# Patient Record
Sex: FEMALE | Race: WHITE | NOT HISPANIC OR LATINO | Employment: FULL TIME | URBAN - METROPOLITAN AREA
[De-identification: names, ages, dates, MRNs, and addresses within clinical notes are randomized per-mention and may not be internally consistent; named-entity substitution may affect disease eponyms.]

---

## 2016-06-15 LAB
CREAT SERPL-MCNC: 0.77 MG/DL (ref 0.57–1.11)
GLUCOSE BLD-MCNC: 108 MG/DL (ref 65–100)

## 2017-01-11 ENCOUNTER — MYC REFILL (OUTPATIENT)
Dept: FAMILY MEDICINE | Facility: CLINIC | Age: 52
End: 2017-01-11

## 2017-01-11 DIAGNOSIS — M25.562 ACUTE PAIN OF LEFT KNEE: ICD-10-CM

## 2017-01-11 DIAGNOSIS — F41.1 GENERALIZED ANXIETY DISORDER: ICD-10-CM

## 2017-01-11 RX ORDER — ESCITALOPRAM OXALATE 20 MG/1
20 TABLET ORAL DAILY
Qty: 90 TABLET | Refills: 0 | Status: CANCELLED | OUTPATIENT
Start: 2017-01-11

## 2017-01-11 RX ORDER — HYDROCODONE BITARTRATE AND ACETAMINOPHEN 5; 325 MG/1; MG/1
TABLET ORAL
Qty: 40 TABLET | Refills: 0 | Status: CANCELLED | OUTPATIENT
Start: 2017-01-11

## 2017-01-11 NOTE — TELEPHONE ENCOUNTER
Patient due for OV and possible further evaluation for Norco request.  Last given in October for knee pain.  My chart message sent to patient to schedule appointment.  Val Bertrand RN  Triage Flex Workforce          Controlled Substance Refill Request for [place] 5/325  Problem List Complete:  No     PROVIDER TO CONSIDER COMPLETION OF PROBLEM LIST AND OVERVIEW/CONTROLLED SUBSTANCE AGREEMENT    Last Written Prescription Date:  10/26/2016  Last Fill Quantity: 40,   # refills: 0    Last Office Visit with [facility] primary care provider: 10/28/2015    Future Office visit:     Controlled substance agreement on file: No.     Processing:  Mail to [facility] pharmacy   checked in past 6 months?  Yes 1/11/2017   RX [place] reviewed:  reviewed- no concerns - placed on providers desk    [place]:  [email or web address]    Lexapro addressed in refill request from pharmacy.

## 2017-01-11 NOTE — TELEPHONE ENCOUNTER
Message from Kirstent:  Original authorizing provider: NURA Hoover CNP would like a refill of the following medications:  escitalopram (LEXAPRO) 20 MG tablet [NURA Hoover CNP]  HYDROcodone-acetaminophen (NORCO) 5-325 MG per tablet [NURA Hoover CNP]    Preferred pharmacy: [facility] [address] AT [facility] [address]    Comment:

## 2017-01-13 NOTE — TELEPHONE ENCOUNTER
Please see My Chart Message.  Sent My Chart reply to patient informing her that her refill request for Lexapro will be forwarded to Viji Gilliland.  Separate lexapro refill encounter routed to Viji Gilliland for approval.  Susanne Skelton RN

## 2017-01-13 NOTE — TELEPHONE ENCOUNTER
Left another message to call back and speak with triage nurse. Also reminded the patient to check her My Chart messages.  Patient has not scheduled an appointment at this time.  Susanne Skelton RN

## 2017-01-18 ENCOUNTER — MYC REFILL (OUTPATIENT)
Dept: FAMILY MEDICINE | Facility: CLINIC | Age: 52
End: 2017-01-18

## 2017-01-18 DIAGNOSIS — G89.29 CHRONIC NECK PAIN: ICD-10-CM

## 2017-01-18 DIAGNOSIS — M54.2 CHRONIC NECK PAIN: ICD-10-CM

## 2017-01-18 RX ORDER — TRAMADOL HYDROCHLORIDE 50 MG/1
50 TABLET ORAL EVERY 8 HOURS PRN
Qty: 40 TABLET | Refills: 0 | Status: SHIPPED | OUTPATIENT
Start: 2017-01-18 | End: 2022-03-30

## 2017-01-18 NOTE — TELEPHONE ENCOUNTER
Message from Kirstent:  Original authorizing provider: NURA Hoover CNP would like a refill of the following medications:  traMADol (ULTRAM) 50 MG tablet [NURA Hoover CNP]    Preferred pharmacy: [facility] [address] [address] [address] [facility] AT [facility]    Comment:  I have an appointment with Viji next week but I am really having pain in my knee, back and neck areas still. Could she approve the Tramadol one more time before my visit next week? Thanks

## 2017-01-18 NOTE — TELEPHONE ENCOUNTER
Ultram      Last Written Prescription Date:  12/29/2016  Last Fill Quantity: 40,   # refills: 0  Last Office Visit with [facility], [facility] or [facility] prescribing provider: 10/25/2015  Future Office visit:    Next 5 appointments (look out 90 days)     Jan 27, 2017  7:30 AM   [facility]    [address] [place] [id number]   [phone]                   Routing refill request to provider for review/approval because:  Drug not on the [facility], [facility] or [facility] refill protocol or controlled substance

## 2017-01-26 ENCOUNTER — TELEPHONE (OUTPATIENT)
Dept: FAMILY MEDICINE | Facility: CLINIC | Age: 52
End: 2017-01-26

## 2017-01-26 NOTE — TELEPHONE ENCOUNTER
Plan does not cover medication prescribed. Per RX benefit plan alternative medications include: Diclofenac NA, Etodolac, IBU, Indomethacin, Ketoprofen, nabumetone, Naprox. Please advise. Leonora Lucero, CMA

## 2017-02-01 ENCOUNTER — MYC REFILL (OUTPATIENT)
Dept: FAMILY MEDICINE | Facility: CLINIC | Age: 52
End: 2017-02-01

## 2017-02-01 DIAGNOSIS — G89.29 CHRONIC NECK PAIN: ICD-10-CM

## 2017-02-01 DIAGNOSIS — M54.2 CHRONIC NECK PAIN: ICD-10-CM

## 2017-02-01 RX ORDER — CELECOXIB 100 MG/1
100 CAPSULE ORAL 2 TIMES DAILY PRN
Qty: 60 CAPSULE | Refills: 0 | Status: SHIPPED | OUTPATIENT
Start: 2017-02-01 | End: 2022-03-30

## 2017-02-01 NOTE — TELEPHONE ENCOUNTER
Please see My Chart Message.  Patient needs to change PCP due to location.  celebrex      Last Written Prescription Date: 11/15/16  Last Quantity: 60, # refills: 1  [facility] Office Visit with [facility] or [facility] prescribing provider: 10/28/15       CREATININE   Date Value Ref Range Status   10/28/2015 0.72 0.52 - 1.04 mg/dL Final     AST       21   10/28/2015  ALT       39   10/28/2015  BP Readings from Last 3 Encounters:   10/28/15 127/85   10/06/14 129/86   09/23/13 122/87     Routing refill request to provider for review/approval because:  Patient needs to be seen because it has been more than 1 year since last office visit.  Susanne Skelton RN

## 2017-02-01 NOTE — TELEPHONE ENCOUNTER
Please tell Karlene that it was a pleasure to help care for her and I will miss her. I do understand about the drive. (She lives in [place], [place]).    I sent the Rx to the pharmacy.

## 2017-02-01 NOTE — TELEPHONE ENCOUNTER
Message from [email or web address]:  Original authorizing provider: NURA Hoover CNP would like a refill of the following medications:  celecoxib (CELEBREX) 100 MG capsule [NURA Hoover CNP]    [facility] [id number] [facility] [address] [facility] AT [facility] OF MA[address] & [facility]    Comment:  Hello, I am changing to a new provider that is closer to my home. I first want to say a huge thank you to Donya and team! Your care has been amazing. I will miss coming to see Donya but the drive and work just has gotten too far. I have a new MD at [facility] in [place] that I will see on Monday, Feb 6. Would Donya be willing to fill my Celebrex one last time as I am out. Thank you again! Karlene

## 2017-02-01 NOTE — TELEPHONE ENCOUNTER
Please verify with patient that she is taking Celebrex and let her know it is no longer covered by her insurance. Arndt she want an alternative prescribed?

## 2017-02-06 ENCOUNTER — OFFICE VISIT - RIVER FALLS (OUTPATIENT)
Dept: FAMILY MEDICINE | Facility: CLINIC | Age: 52
End: 2017-02-06
Payer: COMMERCIAL

## 2017-02-06 LAB
CHOL/HDL RATIO - HISTORICAL: 3 UMOL/L
CHOLEST SERPL-MCNC: 197 MG/DL
CREAT SERPL-MCNC: 0.72 MG/DL
GLUCOSE BLD-MCNC: 74 MG/DL
HDLC SERPL-MCNC: 59 MG/DL
LDLC SERPL CALC-MCNC: 112 MG/DL
TRIGL SERPL-MCNC: 130 MG/DL

## 2017-02-06 ASSESSMENT — MIFFLIN-ST. JEOR: SCORE: 1560.23

## 2017-02-06 NOTE — TELEPHONE ENCOUNTER
patient is transferring care to another provider as of 2/6/17.    Unable to reach after 4 attempts, closing encounter.    Leidy Tran RN  [facility]  [phone]

## 2017-02-13 LAB
CHOLEST SERPL-MCNC: 232 MG/DL
CREAT SERPL-MCNC: 0.65 MG/DL
GLUCOSE BLD-MCNC: 78 MG/DL
HDLC SERPL-MCNC: 62 MG/DL
LDLC SERPL CALC-MCNC: 139 MG/DL
TRIGL SERPL-MCNC: 154 MG/DL

## 2017-02-20 ENCOUNTER — OFFICE VISIT - RIVER FALLS (OUTPATIENT)
Dept: FAMILY MEDICINE | Facility: CLINIC | Age: 52
End: 2017-02-20
Payer: COMMERCIAL

## 2017-02-20 ASSESSMENT — MIFFLIN-ST. JEOR: SCORE: 1537.55

## 2017-06-05 ENCOUNTER — OFFICE VISIT - RIVER FALLS (OUTPATIENT)
Dept: FAMILY MEDICINE | Facility: CLINIC | Age: 52
End: 2017-06-05
Payer: COMMERCIAL

## 2017-06-21 ENCOUNTER — COMMUNICATION - RIVER FALLS (OUTPATIENT)
Dept: FAMILY MEDICINE | Facility: CLINIC | Age: 52
End: 2017-06-21
Payer: COMMERCIAL

## 2017-06-21 ENCOUNTER — OFFICE VISIT - RIVER FALLS (OUTPATIENT)
Dept: FAMILY MEDICINE | Facility: CLINIC | Age: 52
End: 2017-06-21
Payer: COMMERCIAL

## 2017-06-21 ASSESSMENT — MIFFLIN-ST. JEOR: SCORE: 1539.37

## 2017-08-28 ENCOUNTER — OFFICE VISIT - RIVER FALLS (OUTPATIENT)
Dept: FAMILY MEDICINE | Facility: CLINIC | Age: 52
End: 2017-08-28
Payer: COMMERCIAL

## 2017-08-28 ENCOUNTER — COMMUNICATION - RIVER FALLS (OUTPATIENT)
Dept: FAMILY MEDICINE | Facility: CLINIC | Age: 52
End: 2017-08-28
Payer: COMMERCIAL

## 2017-08-28 LAB
CREAT SERPL-MCNC: 0.82 MG/DL (ref 0.57–1.11)
GLUCOSE BLD-MCNC: 86 MG/DL (ref 65–100)

## 2018-03-09 ENCOUNTER — OFFICE VISIT - RIVER FALLS (OUTPATIENT)
Dept: FAMILY MEDICINE | Facility: CLINIC | Age: 53
End: 2018-03-09
Payer: COMMERCIAL

## 2018-03-09 ASSESSMENT — MIFFLIN-ST. JEOR: SCORE: 1540.96

## 2018-10-12 ENCOUNTER — OFFICE VISIT - RIVER FALLS (OUTPATIENT)
Dept: FAMILY MEDICINE | Facility: CLINIC | Age: 53
End: 2018-10-12
Payer: COMMERCIAL

## 2018-10-12 ASSESSMENT — MIFFLIN-ST. JEOR: SCORE: 1559.1

## 2018-10-13 LAB
CREAT SERPL-MCNC: 0.67 MG/DL (ref 0.5–1.05)
GLUCOSE BLD-MCNC: 74 MG/DL (ref 65–99)

## 2019-02-04 ENCOUNTER — OFFICE VISIT - RIVER FALLS (OUTPATIENT)
Dept: FAMILY MEDICINE | Facility: CLINIC | Age: 54
End: 2019-02-04
Payer: COMMERCIAL

## 2019-02-04 ASSESSMENT — MIFFLIN-ST. JEOR: SCORE: 1606.27

## 2019-09-11 ENCOUNTER — COMMUNICATION - RIVER FALLS (OUTPATIENT)
Dept: FAMILY MEDICINE | Facility: CLINIC | Age: 54
End: 2019-09-11

## 2019-09-11 ENCOUNTER — OFFICE VISIT - RIVER FALLS (OUTPATIENT)
Dept: FAMILY MEDICINE | Facility: CLINIC | Age: 54
End: 2019-09-11

## 2020-05-05 ENCOUNTER — AMBULATORY - RIVER FALLS (OUTPATIENT)
Dept: FAMILY MEDICINE | Facility: CLINIC | Age: 55
End: 2020-05-05
Payer: COMMERCIAL

## 2020-05-05 ENCOUNTER — COMMUNICATION - RIVER FALLS (OUTPATIENT)
Dept: FAMILY MEDICINE | Facility: CLINIC | Age: 55
End: 2020-05-05
Payer: COMMERCIAL

## 2020-05-06 ENCOUNTER — COMMUNICATION - RIVER FALLS (OUTPATIENT)
Dept: FAMILY MEDICINE | Facility: CLINIC | Age: 55
End: 2020-05-06
Payer: COMMERCIAL

## 2020-05-06 LAB — SARS-COV-2 RNA SPEC QL NAA+PROBE: NEGATIVE

## 2021-09-10 ENCOUNTER — OFFICE VISIT - RIVER FALLS (OUTPATIENT)
Dept: FAMILY MEDICINE | Facility: CLINIC | Age: 56
End: 2021-09-10
Payer: COMMERCIAL

## 2021-09-10 ASSESSMENT — MIFFLIN-ST. JEOR: SCORE: 1493.67

## 2021-09-13 ENCOUNTER — COMMUNICATION - RIVER FALLS (OUTPATIENT)
Dept: FAMILY MEDICINE | Facility: CLINIC | Age: 56
End: 2021-09-13
Payer: COMMERCIAL

## 2021-09-13 LAB
ERYTHROCYTE [DISTWIDTH] IN BLOOD BY AUTOMATED COUNT: 32.9 %
HGB BLD-MCNC: 9.8 G/DL
PLATELET # BLD AUTO: 430 X10^3/UL
WBC # BLD AUTO: 11.95 X10^3/UL

## 2021-10-28 ENCOUNTER — OFFICE VISIT - RIVER FALLS (OUTPATIENT)
Dept: FAMILY MEDICINE | Facility: CLINIC | Age: 56
End: 2021-10-28
Payer: COMMERCIAL

## 2021-10-28 LAB
BUN SERPL-MCNC: 16 MG/DL (ref 7–25)
BUN/CREAT RATIO - HISTORICAL: NORMAL (ref 6–22)
CALCIUM SERPL-MCNC: 9.2 MG/DL (ref 8.6–10.4)
CHLORIDE BLD-SCNC: 107 MMOL/L (ref 98–110)
CO2 SERPL-SCNC: 27 MMOL/L (ref 20–32)
CREAT SERPL-MCNC: 0.75 MG/DL (ref 0.5–1.05)
EGFRCR SERPLBLD CKD-EPI 2021: 90 ML/MIN/1.73M2
GLUCOSE BLD-MCNC: 83 MG/DL (ref 65–99)
HGB BLD-MCNC: 12.5 GM/DL (ref 11.7–15.5)
POTASSIUM BLD-SCNC: 3.7 MMOL/L (ref 3.5–5.3)
SODIUM SERPL-SCNC: 141 MMOL/L (ref 135–146)

## 2021-10-28 ASSESSMENT — MIFFLIN-ST. JEOR: SCORE: 1492.3

## 2021-10-29 ENCOUNTER — COMMUNICATION - RIVER FALLS (OUTPATIENT)
Dept: FAMILY MEDICINE | Facility: CLINIC | Age: 56
End: 2021-10-29
Payer: COMMERCIAL

## 2021-11-01 ENCOUNTER — COMMUNICATION - RIVER FALLS (OUTPATIENT)
Dept: FAMILY MEDICINE | Facility: CLINIC | Age: 56
End: 2021-11-01
Payer: COMMERCIAL

## 2021-11-01 ENCOUNTER — TRANSFERRED RECORDS (OUTPATIENT)
Dept: HEALTH INFORMATION MANAGEMENT | Facility: CLINIC | Age: 56
End: 2021-11-01

## 2021-11-01 LAB — HPV ABSTRACT: NORMAL

## 2021-11-02 ENCOUNTER — COMMUNICATION - RIVER FALLS (OUTPATIENT)
Dept: FAMILY MEDICINE | Facility: CLINIC | Age: 56
End: 2021-11-02
Payer: COMMERCIAL

## 2021-11-30 ENCOUNTER — OFFICE VISIT - RIVER FALLS (OUTPATIENT)
Dept: FAMILY MEDICINE | Facility: CLINIC | Age: 56
End: 2021-11-30
Payer: COMMERCIAL

## 2021-12-29 ENCOUNTER — OFFICE VISIT - RIVER FALLS (OUTPATIENT)
Dept: FAMILY MEDICINE | Facility: CLINIC | Age: 56
End: 2021-12-29
Payer: COMMERCIAL

## 2022-01-04 ENCOUNTER — COMMUNICATION - RIVER FALLS (OUTPATIENT)
Dept: FAMILY MEDICINE | Facility: CLINIC | Age: 57
End: 2022-01-04
Payer: COMMERCIAL

## 2022-02-11 VITALS
SYSTOLIC BLOOD PRESSURE: 94 MMHG | HEART RATE: 72 BPM | HEART RATE: 74 BPM | DIASTOLIC BLOOD PRESSURE: 92 MMHG | TEMPERATURE: 98.6 F | BODY MASS INDEX: 32.69 KG/M2 | TEMPERATURE: 98.1 F | BODY MASS INDEX: 32.28 KG/M2 | OXYGEN SATURATION: 97 % | WEIGHT: 197 LBS | WEIGHT: 203.4 LBS | SYSTOLIC BLOOD PRESSURE: 118 MMHG | DIASTOLIC BLOOD PRESSURE: 66 MMHG | SYSTOLIC BLOOD PRESSURE: 132 MMHG | HEART RATE: 76 BPM | DIASTOLIC BLOOD PRESSURE: 82 MMHG | HEIGHT: 66 IN | BODY MASS INDEX: 31.8 KG/M2 | WEIGHT: 200 LBS

## 2022-02-11 VITALS
HEART RATE: 81 BPM | SYSTOLIC BLOOD PRESSURE: 128 MMHG | DIASTOLIC BLOOD PRESSURE: 84 MMHG | HEIGHT: 66 IN | DIASTOLIC BLOOD PRESSURE: 94 MMHG | HEIGHT: 66 IN | SYSTOLIC BLOOD PRESSURE: 142 MMHG | BODY MASS INDEX: 31.21 KG/M2 | OXYGEN SATURATION: 99 % | BODY MASS INDEX: 31.16 KG/M2 | OXYGEN SATURATION: 98 % | WEIGHT: 194.2 LBS | WEIGHT: 193.9 LBS | TEMPERATURE: 97.8 F | TEMPERATURE: 98.5 F

## 2022-02-11 VITALS
WEIGHT: 216.4 LBS | DIASTOLIC BLOOD PRESSURE: 80 MMHG | HEART RATE: 76 BPM | HEIGHT: 67 IN | BODY MASS INDEX: 33.97 KG/M2 | OXYGEN SATURATION: 98 % | SYSTOLIC BLOOD PRESSURE: 130 MMHG

## 2022-02-11 VITALS
BODY MASS INDEX: 31.71 KG/M2 | DIASTOLIC BLOOD PRESSURE: 106 MMHG | WEIGHT: 202 LBS | HEIGHT: 67 IN | SYSTOLIC BLOOD PRESSURE: 138 MMHG | HEART RATE: 72 BPM | TEMPERATURE: 98.8 F

## 2022-02-11 VITALS
HEIGHT: 66 IN | BODY MASS INDEX: 33.5 KG/M2 | SYSTOLIC BLOOD PRESSURE: 118 MMHG | DIASTOLIC BLOOD PRESSURE: 68 MMHG | BODY MASS INDEX: 33.5 KG/M2 | HEIGHT: 66 IN

## 2022-02-11 VITALS
SYSTOLIC BLOOD PRESSURE: 120 MMHG | WEIGHT: 203 LBS | DIASTOLIC BLOOD PRESSURE: 82 MMHG | TEMPERATURE: 98.4 F | BODY MASS INDEX: 33.43 KG/M2 | HEART RATE: 76 BPM | WEIGHT: 208 LBS | HEIGHT: 66 IN | BODY MASS INDEX: 32.62 KG/M2 | HEIGHT: 66 IN

## 2022-02-11 VITALS
HEIGHT: 67 IN | HEART RATE: 66 BPM | SYSTOLIC BLOOD PRESSURE: 122 MMHG | BODY MASS INDEX: 32.33 KG/M2 | TEMPERATURE: 99.1 F | DIASTOLIC BLOOD PRESSURE: 80 MMHG | WEIGHT: 206 LBS

## 2022-02-16 NOTE — NURSING NOTE
Comprehensive Intake Entered On:  10/28/2021 8:09 AM CDT    Performed On:  10/28/2021 8:04 AM CDT by Angela Hood LPN               Summary   Chief Complaint :   1) establish care 2) annual exam   Menstrual Status :   Postmenopausal   Weight Measured :   193.9 lb(Converted to: 193 lb 14 oz, 87.952 kg)    Height Measured :   65.75 in(Converted to: 5 ft 6 in, 167.00 cm)    Body Mass Index :   31.53 kg/m2 (HI)    Body Surface Area :   2.02 m2   Systolic Blood Pressure :   142 mmHg (HI)    Diastolic Blood Pressure :   94 mmHg (HI)    Mean Arterial Pressure :   110 mmHg   BP Site :   Right arm   BP Method :   Manual   Temperature Tympanic :   97.8 DegF(Converted to: 36.6 DegC)  (LOW)    Oxygen Saturation :   99 %   Angela Hood LPN - 10/28/2021 8:04 AM CDT   Health Status   Allergies Verified? :   Yes   Medication History Verified? :   Yes   Medical History Verified? :   No   Pre-Visit Planning Status :   Completed   Tobacco Use? :   Former smoker   Angela Hood LPN - 10/28/2021 8:04 AM CDT   Meds / Allergies   (As Of: 10/28/2021 8:09:14 AM CDT)   Allergies (Active)   No Known Medication Allergies  Estimated Onset Date:   Unspecified ; Created By:   Angela Hood LPN; Reaction Status:   Active ; Category:   Drug ; Substance:   No Known Medication Allergies ; Type:   Allergy ; Updated By:   Angela Hood LPN; Reviewed Date:   9/10/2021 1:23 PM CDT        Medication List   (As Of: 10/28/2021 8:09:14 AM CDT)   Prescription/Discharge Order    escitalopram  :   escitalopram ; Status:   Prescribed ; Ordered As Mnemonic:   Lexapro 20 mg oral tablet ; Simple Display Line:   20 mg, 1 tab(s), PO, Daily, 90 tab(s), 3 Refill(s) ; Ordering Provider:   Misty Davila; Catalog Code:   escitalopram ; Order Dt/Tm:   2/4/2019 5:46:58 PM CST            Home Meds    multivitamin with minerals  :   multivitamin with minerals ; Status:   Documented ; Ordered As Mnemonic:   Daily Multi ; Simple Display Line:    1 tab(s), po, daily, 0 Refill(s) ; Catalog Code:   multivitamin with minerals ; Order Dt/Tm:   2/6/2017 6:05:33 PM CST          ZOLMitriptan  :   ZOLMitriptan ; Status:   Documented ; Ordered As Mnemonic:   Zomig 5 mg oral tablet ; Simple Display Line:   5 mg, 1 tab(s), PO, Once, PRN: for migraine headache, 10 tab(s), 0 Refill(s) ; Catalog Code:   ZOLMitriptan ; Order Dt/Tm:   2/6/2017 6:03:41 PM CST          ferrous fumarate  :   ferrous fumarate ; Status:   Documented ; Ordered As Mnemonic:   ferrous fumarate 325 mg (106 mg elemental iron) oral tablet ; Simple Display Line:   325 mg, 1 tab(s), Oral, daily, 0 Refill(s) ; Catalog Code:   ferrous fumarate ; Order Dt/Tm:   9/10/2021 2:05:03 PM CDT          omeprazole  :   omeprazole ; Status:   Documented ; Ordered As Mnemonic:   omeprazole 20 mg oral delayed release capsule ; Simple Display Line:   20 mg, 1 cap(s), Oral, bid, 0 Refill(s) ; Catalog Code:   omeprazole ; Order Dt/Tm:   9/10/2021 2:05:34 PM CDT

## 2022-02-16 NOTE — LETTER
(Inserted Image. Unable to display)   March 13, 2019        LULU LARA  [address]  [place], [place] [id number]        Dear LULU,      Thank you for selecting [facility] ([facility]viously [facility]) for your healthcare needs.    Our records indicate you are due for the following services:     Annual Physical and Gynecologic Exam ~ Yearly wellness exams are important for your ongoing health and wellness.  This exam gives you the opportunity to meet with your Healthcare Provider to review your health, update immunizations and to recommend preventive screenings that you may be due for.  At your wellness visit, your health care provider will determine the need for a gynecologic exam and/or pap smear.     To schedule an appointment or if you have further questions, please contact your primary clinic:   [facility]       [phone]   [facility]       [phone]              [facility]     [phone]      Powered by [employer]    Sincerely,    Misty Whaley NP

## 2022-02-16 NOTE — CARE COORDINATION
Pt appears on [facility] chronic disease panel as out of parameters for HTN.  Pt was seen and provider will start HCTZ if BP stays high return in 3 months, RTC 6/18 for lab,  RTC AWV 3/2019

## 2022-02-16 NOTE — TELEPHONE ENCOUNTER
"---------------------  From: Myrna Guadalupe   To: Elpidio HOPKINS, Erwin CAVANAUGH;     Sent: 9/11/2019 12:49:14 PM CDT  Subject: Scheduling Management     PT MISSED APPT WITH BARBARA WILLIS, patient states \"uncontrolled anxiety\"  this appt was requested via patient portal  "

## 2022-02-16 NOTE — TELEPHONE ENCOUNTER
---------------------  From: Loraine Kelly   To: LULU LARA    Sent: 10/29/2021 4:03:14 PM CDT  Subject: General Message     Lab work looks greatLulu. Thank you.    TAYLER Chaudhry      Results:  Date Result Name Value Ref Range   10/28/2021 9:03 AM Glucose Level 83 mg/dL (65 - 99)   10/28/2021 9:03 AM BUN 16 mg/dL (7 - 25)   10/28/2021 9:03 AM Creatinine Level 0.75 mg/dL (0.50 - 1.05)   10/28/2021 9:03 AM eGFR 90 mL/min/1.73m2 (> OR = 60 - )   10/28/2021 9:03 AM eGFR African American 105 mL/min/1.73m2 (> OR = 60 - )   10/28/2021 9:03 AM BUN/Creat Ratio NOT APPLICABLE (6 - 22)   10/28/2021 9:03 AM Sodium Level 141 mmol/L (135 - 146)   10/28/2021 9:03 AM Potassium Level 3.7 mmol/L (3.5 - 5.3)   10/28/2021 9:03 AM Chloride Level 107 mmol/L (98 - 110)   10/28/2021 9:03 AM CO2 Level 27 mmol/L (20 - 32)   10/28/2021 9:03 AM Calcium Level 9.2 mg/dL (8.6 - 10.4)   10/28/2021 9:03 AM Hgb 12.5 gm/dL (11.7 - 15.5)

## 2022-02-16 NOTE — TELEPHONE ENCOUNTER
---------------------  From: Sree Willoughby MD   To: ZIM Message Pool ([id number]_[facility]); LULU LARA    Sent: 5/6/2020 8:29:47 AM CDT  Subject: Patient Message - Results Notification   Actions: Notify the patient with results         test is negative    Results:  Date Result Name Value   5/5/2020 4:00 PM Coronavirus SARS-CoV-2 (COVID-19) NEGATIVELMTCB at 8:45amspoke with patient and informed her of results. She expressed understanding.

## 2022-02-16 NOTE — PROGRESS NOTES
Patient:   LULU LARA            MRN: [id number]            FIN: [id number]               Age:   56 years     Sex:  Female     :  1965   Associated Diagnoses:   Well adult; Depression; GI bleed; Hypertension; Migraines; Osteoarthritis, knee; Screening for cervical cancer   Author:   Loraine Kelly      Visit Information      Date of Service: 10/28/2021 07:49 am  Performing Location: [facility]  Encounter#: [id number]      Primary Care Provider (PCP):  Loraine Kelly    NPI# [id number]      Referring Provider:  Loraine Kelly NPI# [id number]      Chief Complaint   10/28/2021 8:04 AM CDT   1) establish care 2) annual exam        Well Adult History   Well Adult History             The patient presents for well adult exam, needs annual exam , last done a year ago at [facility] in , she does them yearly, mom with breast cancer  -no colon cancer in family . She has recent colonoscopy when she also had EGD and 'capsule endoscopy which diagnosed ulcer in the small intestine.  This was surgically treated last month. She needs repeat hemoglobin to make sure it is rising. She has had no more blood in stool or vomitins since August. She is on omeprazole daily now, rather than bid. She reports the 'ulcer' was related to concomitant use of Celebrex, Excedrin and Advil. She has stopped all those things. ALTAGRACIA signed today to get records  -----the migraines are well controlled and doesn't need excedrin anymore, she uses triptan maybe twice a month and requests refills  -----She willl never again use and NSAID  ------ She does need something for pain control in her knees, both will require knee replacements, she is considering this next summer. She is using TYlenol 1000mg bid with no relate, has voltaren gel with minimal relief, has used gabapentin in past for HAs with some side effects that affect her mentation and she would jprefer not to use that again.  Does not want opiods  but tramadol a couple times a day in the past has been helpful. She would consider Butrans patch with a little more information.  She uses warm shower in the morning, ice and walking hourly to help with pain    She has hx of HTN, last few bps have been high.  Will restart HCTZ at lower dose than before and emphasized potassium intake    Pap is due, will do today, partner with vasectomy  She is vaccinated fully for COVID and flu.  The general health status is good.  The patient's diet is described as balanced.  Exercise: occasional.  Associated symptoms consist of none.  Last menstrual period: regular.  Medical encounters:.  Additional pertinent history: tobacco use none.        Review of Systems   Constitutional:  Negative except as documented in history of present illness.    Eye:  Negative except as documented in history of present illness.    Respiratory:  Negative except as documented in history of present illness.    Cardiovascular:  Negative except as documented in history of present illness.    Breast:  Negative.    Gastrointestinal:  Negative except as documented in history of present illness.    Genitourinary:  Negative except as documented in history of present illness.    Gynecologic:  Negative except as documented in history of present illness.    Hematology/Lymphatics:  Negative except as documented in history of present illness.    Endocrine:  Negative except as documented in history of present illness.    Immunologic:  Negative except as documented in history of present illness.    Musculoskeletal:  Negative except as documented in history of present illness.    Integumentary:  Negative except as documented in history of present illness.    Neurologic:  Negative except as documented in history of present illness.    Psychiatric:  Negative except as documented in history of present illness.              Health Status   Allergies:    Allergic Reactions (Selected)  No Known Medication Allergies    Medications:  (Selected)   Prescriptions  Prescribed  Lexapro 20 mg oral tablet: = 1 tab(s) ( 20 mg ), PO, Daily, # 90 tab(s), 1 Refill(s), Type: Maintenance, Pharmacy: [facility], 1 tab(s) Oral daily, 65.75, in, 10/28/21 8:04:00 CDT, Height Measured, 193.9, lb, 10/28/21 8:04:00 CDT, Weight Measured  Slow Fe (as elemental iron) 45 mg oral tablet, extended release: = 1 tab(s) ( 45 mg ), Oral, daily, Instructions: to replace current iron supplement, # 90 tab(s), 1 Refill(s), Type: Maintenance, Pharmacy: [facility], 1 tab(s) Oral daily,Instr:to replace current iron supplement, 65.75, in, 1...  Zomig 5 mg oral tablet: = 1 tab(s) ( 5 mg ), PO, Once, PRN: for migraine headache, # 12 tab(s), 2 Refill(s), Type: Soft Stop, Pharmacy: [facility], 1 tab(s) Oral once,PRN:for migraine headache, 65.75, in, 10/28/21 8:04:00 CDT, Height Measured, 193.9,...  hydroCHLOROthiazide 25 mg oral tablet: = 0.5 tab(s) ( 12.5 mg ), PO, Daily, # 45 tab(s), 1 Refill(s), Type: Maintenance, Pharmacy: [facility], 0.5 tab(s) Oral daily,x90 day(s), 65.75, in, 10/28/21 8:04:00 CDT, Height Measured, 193.9, lb, 10/28/21 8:04:00 CDT, Weigh...  omeprazole 20 mg oral delayed release capsule: = 1 cap(s) ( 20 mg ), Oral, daily, # 90 EA, 1 Refill(s), Type: Maintenance, Pharmacy: [facility], 1 cap(s) Oral daily, 65.75, in, 10/28/21 8:04:00 CDT, Height Measured, 193.9, lb, 10/28/21 8:04:00 CDT, Weight Measured  Documented Medications  Documented  Daily Multi: 1 tab(s), po, daily, 0 Refill(s), Type: Maintenance  ferrous fumarate 325 mg (106 mg elemental iron) oral tablet: = 1 tab(s) ( 325 mg ), Oral, daily, 0 Refill(s), Type: Maintenance,    Medications          *denotes recorded medication          Zomig 5 mg oral tablet: 5 mg, 1 tab(s), PO, Once, PRN: for migraine headache, 12 tab(s), 2 Refill(s).          Lexapro 20 mg oral tablet: 20  mg, 1 tab(s), PO, Daily, 90 tab(s), 1 Refill(s).          *ferrous fumarate 325 mg (106 mg elemental iron) oral tablet: 325 mg, 1 tab(s), Oral, daily, 0 Refill(s).          Slow Fe (as elemental iron) 45 mg oral tablet, extended release: 45 mg, 1 tab(s), Oral, daily, to replace current iron supplement, 90 tab(s), 1 Refill(s).          hydroCHLOROthiazide 25 mg oral tablet: 12.5 mg, 0.5 tab(s), PO, Daily, for 90 day(s), 45 tab(s), 1 Refill(s).          *Daily Multi: 1 tab(s), po, daily, 0 Refill(s).          omeprazole 20 mg oral delayed release capsule: 20 mg, 1 cap(s), Oral, daily, 90 EA, 1 Refill(s).       Problem list:    All Problems  Osteoarthritis, knee / SNOMED CT 742630302 / Confirmed  Obesity / SNOMED CT 2511815902 / Probable  Depression / SNOMED CT 0273380403 / Confirmed  Migraines / SNOMED CT 33194617 / Confirmed  HTN (hypertension) / SNOMED CT 4872846434 / Confirmed  GI bleed / SNOMED CT 805936865 / Confirmed  Chronic neck pain / SNOMED CT 7406895549 / Confirmed  Chronic back pain / SNOMED CT 604200213 / Confirmed  Anxiety disorder / SNOMED CT 009550343 / Confirmed  Resolved: Pregnancy / SNOMED CT 596240606  Resolved: Pregnancy / SNOMED CT 968258968  Resolved: History of chicken pox / SNOMED CT 714573596  Resolved: ASCUS / SNOMED CT 81995346  reflex negative HPV DNA      Histories   Past Medical History:    Active  Anxiety disorder (613457216)  Depression (5551890186)  Migraines (90177188)  Chronic neck pain (1194445070)  Chronic back pain (473917038)  Resolved  ASCUS (17813093): Onset on 3/28/2008 at 42 years.  Resolved.  Comments:  2/13/2017 CST 6:18 PM CST - Ella Zamudio CMA  reflex negative HPV DNA  History of chicken pox (570735238):  Resolved.  Pregnancy (343570945):  Resolved in 1991 at 25 years.  Pregnancy (808315512):  Resolved in 1994 at 28 years.   Family History:    Son: Matthew      History is negative.  Daughter: Julio      History is negative.  Mother: Lauren Payne  Cancer  Migraine  High cholesterol  Hypertension  Depression     Procedure history:    Cholecystectomy (SNOMED CT 26124159) in 2019 at 54 Years.  Ultrasound (SNOMED CT 606912947) on 11/11/2011 at 46 Years.  Comments:  2/13/2017 5:53 PM Ella Ye CMA  abdominal u/s   conclusion: Several calcified splenic granulomas, benign etiology. No additional acute abdominal ultrasound abnormality.  Plain X-ray lumbar spine normal (SNOMED CT 328505314) on 7/18/2011 at 46 Years.  Comments:  2/13/2017 5:57 PM Ella Ye CMA  within normal limits for age  CT of abdomen and pelvis (SNOMED CT 0775536056) on 2/19/2011 at 45 Years.  Comments:  2/13/2017 5:59 PM Ella Ye CMA  conclusion:   1. No definite renal calculi, renal mass or obstructive process.  2. No distinct abdominal or pelvic mass.  X-ray of abdomen (SNOMED CT 120306205) on 7/28/2008 at 43 Years.  Comments:  2/13/2017 6:03 PM Ella Ye CMA  Impression: The most inferior medial right pelvic calcification could represent te right ureterovesical junction stone but there was a second calcification at this level on the CT compatible with phlebolith. It is indeterminate whether this calcification represents the phlebolith or the ureteral stone at the same approximate level.  Ultrasound (SNOMED CT 791147619) on 7/17/2008 at 43 Years.  Comments:  2/13/2017 6:11 PM Ella Ye CMA  u/s abdomen  Impression:   1. Contracted gallbladder without evidence of cholelithiasis. No convincing evidence of cholecystitis.  2. Mild right-sided hydronephrosis. This is consistent with a distal right ureteral calculus demonstrated on subsequent CT scan.  CT of abdomen and pelvis (SNOMED CT 1991605022) on 7/17/2008 at 43 Years.  Comments:  2/13/2017 6:15 PM Ella Ye CMA  Impression:   1. 3 mm calculus at the distal right ureterovesicular junction with mild proximal hydronephrosis.  2. No other renal, ureteral or bladder calculi are  demonstrated .  MRI of cervical spine (SNOMED CT 852636596) on 10/26/2007 at 42 Years.  Comments:  2/13/2017 6:31 PM NATHALIA - Ella Zamudio CMA  Conclusion:  1. Moderate C5-6 degenerative disc disease with a 2.5 mm right posterolateral and foraminal disc herniation. This contacts the ventral cord with moderate foraminal encroachment and right C6 nerve root impingement.  2. A midline annular fissure and bulge at C4-5 contacts the ventral surface of the cord.  lipoma removal on back.  avm removal L knee.   Social History:        Electronic Cigarette/Vaping Assessment            Electronic Cigarette Use: Never.      Alcohol Assessment            Never      Tobacco Assessment            Former smoker, quit more than 30 days ago      Substance Abuse Assessment            Never      Employment and Education Assessment            Employed, Work/School description: -Dialysis.      Home and Environment Assessment            Marital status: .  Spouse/Partner name: Alan.      Nutrition and Health Assessment            Type of diet: Regular.      Exercise and Physical Activity Assessment            Exercise frequency: Daily.  Exercise type: Walking, Weight lifting, cardio.      Sexual Assessment            Sexually active: Yes.  Sexual orientation: Heterosexual.  Contraceptive Use Details: Vaginal ring.        Physical Examination   Vital Signs   10/28/2021 8:04 AM CDT Temperature Tympanic 97.8 DegF  LOW    Systolic Blood Pressure 142 mmHg  HI    Diastolic Blood Pressure 94 mmHg  HI    Mean Arterial Pressure 110 mmHg    BP Site Right arm    BP Method Manual    Oxygen Saturation 99 %      Measurements from flowsheet : Measurements   10/28/2021 8:04 AM CDT Height Measured - Standard 65.75 in    Weight Measured - Standard 193.9 lb    BSA 2.02 m2    Body Mass Index 31.53 kg/m2  HI      General:  Alert and oriented, No acute distress, vital signs stable, as noted above.    Eye:  Pupils are equal, round and  reactive to light, Extraocular movements are intact, Normal conjunctiva.    HENT:  Normocephalic, Tympanic membranes are clear, Normal hearing.    Neck:  Supple, Non-tender, No lymphadenopathy, No thyromegaly.    Respiratory:  Lungs are clear to auscultation, Respirations are non-labored, Breath sounds are equal, Symmetrical chest wall expansion.    Cardiovascular:  Normal rate, Regular rhythm, No murmur, No edema.    Gastrointestinal:  Soft, Non-tender, Non-distended, No organomegaly.    Genitourinary:  Normal genitalia for age and sex, No inguinal tenderness, No urethral discharge, No lesions.         Perineum: Within normal limits.         Groin/ inguinal region: Within normal limits.         Urethra: Within normal limits.         Vagina: Within normal limits.         Labia: Within normal limits.         Cervix: Within normal limits.         Uterus: Within normal limits.         Adnexa: Within normal limits.    Lymphatics:  no axillary, no supra or infraclavicular nor inguinal lymphadenopathy palpable.    Musculoskeletal:  Normal range of motion, Normal strength, No deformity, Normal gait.    Integumentary:  Warm, Dry, Pink, Intact, No rash.    Neurologic:  Alert, Oriented, Normal sensory, Normal motor function, Cranial Nerves II-XII are grossly intact.    Psychiatric:  Cooperative, Appropriate mood & affect, Normal judgment, PHQ 9/CAGE questionaire reviewed and discussed with patient,  see score.       Impression and Plan   Diagnosis     Well adult (ZMG64-ZB Z00.00).     Depression (RMS58-EJ F32.9).     GI bleed (FFT41-DT K92.2).     Hypertension (SFW19-TI I10).     Migraines (NAB18-WH G43.909).     Osteoarthritis, knee (CWO77-OU M17.10).     Screening for cervical cancer (ZLW46-QB Z12.4).     Course:  will await ALTAGRACIA and records for review--change type of iron supplement as it gives her diarrhea. PPI daily. NO NSAIDS  counseled regarding risk/sbenefits of Butrans, she will consider. WIll send few tramadol  today  Re start HTN meds  Refill depression/migraine meds, well controlled.    Patient Instructions:       Counseled: Patient, Regarding diagnosis, Regarding medications, Verbalized understanding, counseled on health benefits of healthy weight, regular exercise, healthy diet.    Orders     Orders (Selected)   Outpatient Orders  Ordered (In Transit)  Basic Metabolic Panel* ([facility]): Specimen Type: Serum, Collection Date: 10/28/21 8:57:00 CDT  Hemoglobin* ([facility]): Specimen Type: Blood, Collection Date: 10/28/21 8:57:00 CDT  ThinPrep Imaging Pap and HPV mRNA E6/E7* ([facility]): Specimen Type: Pap, Collection Date: 10/28/21 8:58:00 CDT  Prescriptions  Prescribed  Lexapro 20 mg oral tablet: = 1 tab(s) ( 20 mg ), PO, Daily, # 90 tab(s), 1 Refill(s), Type: Maintenance, Pharmacy: [facility], 1 tab(s) Oral daily, 65.75, in, 10/28/21 8:04:00 CDT, Height Measured, 193.9, lb, 10/28/21 8:04:00 CDT, Weight Measured  Slow Fe (as elemental iron) 45 mg oral tablet, extended release: = 1 tab(s) ( 45 mg ), Oral, daily, Instructions: to replace current iron supplement, # 90 tab(s), 1 Refill(s), Type: Maintenance, Pharmacy: [facility], 1 tab(s) Oral daily,Instr:to replace current iron supplement, 65.75, in, 1...  Zomig 5 mg oral tablet: = 1 tab(s) ( 5 mg ), PO, Once, PRN: for migraine headache, # 12 tab(s), 2 Refill(s), Type: Soft Stop, Pharmacy: [facility], 1 tab(s) Oral once,PRN:for migraine headache, 65.75, in, 10/28/21 8:04:00 CDT, Height Measured, 193.9,...  hydroCHLOROthiazide 25 mg oral tablet: = 0.5 tab(s) ( 12.5 mg ), PO, Daily, # 45 tab(s), 1 Refill(s), Type: Maintenance, Pharmacy: [facility], 0.5 tab(s) Oral daily,x90 day(s), 65.75, in, 10/28/21 8:04:00 CDT, Height Measured, 193.9, lb, 10/28/21 8:04:00 CDT, Weigh...  omeprazole 20 mg oral delayed release capsule: = 1 cap(s) ( 20 mg ), Oral, daily, # 90 EA, 1 Refill(s), Type:  Maintenance, Pharmacy: [facility], 1 cap(s) Oral daily, 65.75, in, 10/28/21 8:04:00 CDT, Height Measured, 193.9, lb, 10/28/21 8:04:00 CDT, Weight Measured.     Orders     Orders (Selected)   Prescriptions  Prescribed  traMADol 50 mg oral tablet: = 1 tab(s) ( 50 mg ), Oral, q4 hrs, Instructions: not to exceed 200 mg/day, PRN: for pain, # 30 tab(s), 0 Refill(s), Type: Maintenance, Pharmacy: [facility], 1 tab(s) Oral q4 hrs,PRN:for pain,Instr:not to exceed 200 mg/day, 65....

## 2022-02-16 NOTE — NURSING NOTE
Comprehensive Intake Entered On:  12/29/2021 8:34 AM CST    Performed On:  12/29/2021 8:30 AM CST by Lyudmila Schmidt CMA               Summary   Chief Complaint :   Follow up HTN and pain control with Butran patches. Verbal consent granted for telemedicine visit.   Menstrual Status :   Postmenopausal   Height Measured :   65.75 in(Converted to: 5 ft 6 in, 167.00 cm)    Systolic Blood Pressure :   118 mmHg   Diastolic Blood Pressure :   68 mmHg   Mean Arterial Pressure :   85 mmHg   Lyudmila Schmidt CMA - 12/29/2021 8:30 AM CST   Health Status   Allergies Verified? :   Yes   Medication History Verified? :   Yes   Medical History Verified? :   Yes   Pre-Visit Planning Status :   Completed   Tobacco Use? :   Former smoker   Lyudmila Schmidt CMA - 12/29/2021 8:30 AM CST   Consents   Consent for Immunization Exchange :   Consent Granted   Consent for Immunizations to Providers :   Consent Granted   Lyudmila Schmidt CMA - 12/29/2021 8:30 AM CST   Meds / Allergies   (As Of: 12/29/2021 8:34:03 AM CST)   Allergies (Active)   No Known Medication Allergies  Estimated Onset Date:   Unspecified ; Created By:   Angela Hood LPN; Reaction Status:   Active ; Category:   Drug ; Substance:   No Known Medication Allergies ; Type:   Allergy ; Updated By:   Angela Hood LPN; Reviewed Date:   12/29/2021 8:31 AM CST        Medication List   (As Of: 12/29/2021 8:34:03 AM CST)   Prescription/Discharge Order    buprenorphine  :   buprenorphine ; Status:   Prescribed ; Ordered As Mnemonic:   Butrans 10 mcg/hr transdermal film, extended release ; Simple Display Line:   See Instructions, 1 patch(es) TD every 7 days  apply to clean, dry, intact skin, 4 EA, 0 Refill(s) ; Ordering Provider:   Loraine Kelly; Catalog Code:   buprenorphine ; Order Dt/Tm:   11/30/2021 1:18:36 PM CST          escitalopram  :   escitalopram ; Status:   Prescribed ; Ordered As Mnemonic:   Lexapro 20 mg oral tablet ; Simple Display Line:   20 mg, 1 tab(s), PO,  Daily, 90 tab(s), 1 Refill(s) ; Ordering Provider:   Loranie Kelly; Catalog Code:   escitalopram ; Order Dt/Tm:   10/28/2021 8:27:22 AM CDT          ferrous sulfate  :   ferrous sulfate ; Status:   Prescribed ; Ordered As Mnemonic:   Slow Fe (as elemental iron) 45 mg oral tablet, extended release ; Simple Display Line:   45 mg, 1 tab(s), Oral, daily, to replace current iron supplement, 90 tab(s), 1 Refill(s) ; Ordering Provider:   Loraine Kelly; Catalog Code:   ferrous sulfate ; Order Dt/Tm:   10/28/2021 8:25:33 AM CDT          hydroCHLOROthiazide  :   hydroCHLOROthiazide ; Status:   Prescribed ; Ordered As Mnemonic:   hydroCHLOROthiazide 25 mg oral tablet ; Simple Display Line:   25 mg, 1 tab(s), PO, Daily, for 90 day(s), 90 tab(s), 1 Refill(s) ; Ordering Provider:   Loraine Kelly; Catalog Code:   hydroCHLOROthiazide ; Order Dt/Tm:   10/28/2021 8:33:28 AM CDT          omeprazole  :   omeprazole ; Status:   Prescribed ; Ordered As Mnemonic:   omeprazole 20 mg oral delayed release capsule ; Simple Display Line:   20 mg, 1 cap(s), Oral, daily, 90 EA, 1 Refill(s) ; Ordering Provider:   Loraine Kelly; Catalog Code:   omeprazole ; Order Dt/Tm:   10/28/2021 8:27:51 AM CDT          ZOLMitriptan  :   ZOLMitriptan ; Status:   Prescribed ; Ordered As Mnemonic:   Zomig 5 mg oral tablet ; Simple Display Line:   5 mg, 1 tab(s), PO, Once, PRN: for migraine headache, 12 tab(s), 2 Refill(s) ; Ordering Provider:   Loraine Kelly; Catalog Code:   ZOLMitriptan ; Order Dt/Tm:   10/28/2021 8:26:56 AM CDT            Home Meds    ferrous fumarate  :   ferrous fumarate ; Status:   Documented ; Ordered As Mnemonic:   ferrous fumarate 325 mg (106 mg elemental iron) oral tablet ; Simple Display Line:   325 mg, 1 tab(s), Oral, daily, 0 Refill(s) ; Catalog Code:   ferrous fumarate ; Order Dt/Tm:   9/10/2021 2:05:03 PM CDT          lisdexamfetamine  :   lisdexamfetamine ; Status:   Documented ; Ordered As Mnemonic:    Vyvanse 50 mg oral capsule ; Simple Display Line:   50 mg, 1 cap(s), Oral, qam, 0 Refill(s) ; Catalog Code:   lisdexamfetamine ; Order Dt/Tm:   12/29/2021 8:33:07 AM CST          multivitamin with minerals  :   multivitamin with minerals ; Status:   Documented ; Ordered As Mnemonic:   Daily Multi ; Simple Display Line:   1 tab(s), po, daily, 0 Refill(s) ; Catalog Code:   multivitamin with minerals ; Order Dt/Tm:   2/6/2017 6:05:33 PM CST

## 2022-02-16 NOTE — TELEPHONE ENCOUNTER
---------------------  From: Bridgett Snyder RN (Phone Messages Pool ([id number]_[facility]))   To: [facility] Message Pool ([id number]_[facility]);     Sent: 11/1/2021 9:31:43 AM CDT  Subject: CONSUMER MESSAGE FW: Butrans Patch           ---------------------  From: LULU LARA  To: [facility]  Sent: 10/31/2021 05:57 p.m. CDT  Subject: Butrans Patch  Peter Baron,  Saw your message on labs and was very happy my hemoglobin is coming up nicely. I had a chance to research the Butrans patch and it seems like this is an excellent option for me to try. Would you send in a prescription for me? [facility] in [place].  Thank you!  Karlene---------------------  From: Angela Hood LPN ([facility] Message Pool ([id number]_[facility]))   To: Loraine Kelly;     Sent: 11/1/2021 12:14:21 PM CDT  Subject: FW: CONSUMER MESSAGE FW: Butrans Patch---------------------  From: Loraine Kelly   To: LULU LARA    Sent: 11/1/2021 5:26:28 PM CDT  Subject: RE: CONSUMER MESSAGE FW: Butrans Patch     Peter Soler,  I sent a prescription to [facility] for 1 month and 1 refill for the Butrans 5mcg patch.  As we discussed at the visit, keep this in a safe place so that children or others do not use it. Do NOT drink alcohol while using the patch.  You must NOT use any opioids (narcotics) when using this. If you need to supplement with Acetaminophen (Tylenol) 1000mg three times per day that would be okay. Ice to painful areas would be okay.  See me in about 1 month so we can discuss how this is going. Please reply so I know you received this message. Thank you.    TAYLER ChaudhryThe [place] Prescription Drug Monitoring Program website queried today, no concerns

## 2022-02-16 NOTE — PROGRESS NOTES
Patient:   LULU LARA            MRN: [id number]            FIN: [id number]               Age:   56 years     Sex:  Female     :  1965   Associated Diagnoses:   Binge eating; Chronic back pain; Chronic neck pain; Essential (primary) hypertension; Osteoarthritis, knee   Author:   Loraine Kelly      Visit Information      Date of Service: 2021 06:30 am  Performing Location: [facility]  Encounter#: [id number]      Primary Care Provider (PCP):  Loraine Kelly    NPI# [id number]      Referring Provider:  Loraine Kelly# [id number]   Visit type:  Telephone Encounter.    Source of history:  Patient.    Location of patient:  _home  Call Start Time:   _845  Call End Time:    _900      Chief Complaint   2021 8:30 AM CST   Follow up HTN and pain control with Butran patches. Verbal consent granted for telemedicine visit.     _      History of Present Illness   Today's visit was conducted via telephone due to the COVID-19 pandemic. Patient's consent to telephone visit was obtained and documented.      Reason for visit:  _  10/28/2021   She does need something for pain control in her knees, both will require knee replacements, she is considering this next summer. She is using TYlenol 1000mg bid with no relate, has voltaren gel with minimal relief, has used gabapentin in past for HAs with some side effects that affect her mentation and she would jprefer not to use that again.  Does not want opiods but tramadol a couple times a day in the past has been helpful. She would consider Butrans patch with a little more information.  She uses warm shower in the morning, ice and walking hourly to help with pain    She has hx of HTN, last few bps have been high.  Will restart HCTZ at lower dose than before and emphasized potassium intake    2021  She is monitoring bp at home and still 140 systolic, 70 diastolic. No side effects. Will increase to full 25 mg daily  HCTZ  Butrans is helping but only a small amount. No using the Tramadol. is using 1300mg Acetaminophen twice a day. 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   She is feeling much better relief of chronic pain with the Butrans at 10 mcg as opposed to the 5mcg dose. No side effects. It does seem to wear off after about 5 1/2 days.   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      Impression and Plan   Diagnosis     Binge eating (FDZ22-UC R63.2).     Chronic back pain (FBM00-CP M54.9).     Chronic neck pain (YCT35-JY M54.2).     Essential (primary) hypertension (OZG92-QH I10).     Osteoarthritis, knee (SLQ69-ZA M17.10).     Course:  Improving.    Plan:  both blood pressure and pain control are improved  labs UTD  will increase butrans to 15 mcg but advised that if higher doses are needed there is more liklihood of prolonged QT syndrome so would avoid higher doses  see me in clinic in 1 month for med recheck and CSA  The [place] Prescription Drug Monitoring Program website queried today, no concerns.    Patient Instructions:       Counseled: Patient.       Health Status   Allergies:    Allergic Reactions (Selected)  No Known Medication Allergies   Medications:  (Selected)   Prescriptions  Prescribed  Butrans 10 mcg/hr transdermal film, extended release: See Instructions, Instructions: 1 patch(es) TD every 7 days  apply to clean, dry, intact skin, # 4 EA, 0 Refill(s), Type:  Maintenance, Pharmacy: [facility], 1 patch(es) TD every 7 days; apply to clean, dry, intact skin, 65.75,...  Lexapro 20 mg oral tablet: = 1 tab(s) ( 20 mg ), PO, Daily, # 90 tab(s), 1 Refill(s), Type: Maintenance, Pharmacy: [facility], 1 tab(s) Oral daily, 65.75, in, 10/28/21 8:04:00 CDT, Height Measured, 193.9, lb, 10/28/21 8:04:00 CDT, Weight Measured  Slow Fe (as elemental iron) 45 mg oral tablet, extended release: = 1 tab(s) ( 45 mg ), Oral, daily, Instructions: to replace current iron supplement, # 90 tab(s), 1 Refill(s), Type: Maintenance, Pharmacy: [facility], 1 tab(s) Oral daily,Instr:to replace current iron supplement, 65.75, in, 1...  Zomig 5 mg oral tablet: = 1 tab(s) ( 5 mg ), PO, Once, PRN: for migraine headache, # 12 tab(s), 2 Refill(s), Type: Soft Stop, Pharmacy: [facility], 1 tab(s) Oral once,PRN:for migraine headache, 65.75, in, 10/28/21 8:04:00 CDT, Height Measured, 193.9,...  hydroCHLOROthiazide 25 mg oral tablet: = 1 tab(s) ( 25 mg ), PO, Daily, # 90 tab(s), 1 Refill(s), Type: Maintenance, Pharmacy: [facility], 65.75, in, 10/28/21 8:04:00 CDT, Height Measured, 193.9, lb, 10/28/21 8:04:00 CDT, Weight Measured  omeprazole 20 mg oral delayed release capsule: = 1 cap(s) ( 20 mg ), Oral, daily, # 90 EA, 1 Refill(s), Type: Maintenance, Pharmacy: [facility], 1 cap(s) Oral daily, 65.75, in, 10/28/21 8:04:00 CDT, Height Measured, 193.9, lb, 10/28/21 8:04:00 CDT, Weight Measured  Documented Medications  Documented  Daily Multi: 1 tab(s), po, daily, 0 Refill(s), Type: Maintenance  Vyvanse 50 mg oral capsule: = 1 cap(s) ( 50 mg ), Oral, qam, 0 Refill(s), Type: Maintenance  ferrous fumarate 325 mg (106 mg elemental iron) oral tablet: = 1 tab(s) ( 325 mg ), Oral, daily, 0 Refill(s), Type: Maintenance,    Medications          *denotes recorded medication           Zomig 5 mg oral tablet: 5 mg, 1 tab(s), PO, Once, PRN: for migraine headache, 12 tab(s), 2 Refill(s).          Butrans 10 mcg/hr transdermal film, extended release: See Instructions, 1 patch(es) TD every 7 days  apply to clean, dry, intact skin, 4 EA, 0 Refill(s).          Lexapro 20 mg oral tablet: 20 mg, 1 tab(s), PO, Daily, 90 tab(s), 1 Refill(s).          *ferrous fumarate 325 mg (106 mg elemental iron) oral tablet: 325 mg, 1 tab(s), Oral, daily, 0 Refill(s).          Slow Fe (as elemental iron) 45 mg oral tablet, extended release: 45 mg, 1 tab(s), Oral, daily, to replace current iron supplement, 90 tab(s), 1 Refill(s).          hydroCHLOROthiazide 25 mg oral tablet: 25 mg, 1 tab(s), PO, Daily, for 90 day(s), 90 tab(s), 1 Refill(s).          *Vyvanse 50 mg oral capsule: 50 mg, 1 cap(s), Oral, qam, 0 Refill(s).          *Daily Multi: 1 tab(s), po, daily, 0 Refill(s).          omeprazole 20 mg oral delayed release capsule: 20 mg, 1 cap(s), Oral, daily, 90 EA, 1 Refill(s).       Problem list:    All Problems  Osteoarthritis, knee / SNOMED CT 253896419 / Confirmed  Obesity / SNOMED CT 4086342964 / Probable  Depression / SNOMED CT 5435815711 / Confirmed  Migraines / SNOMED CT 54206719 / Confirmed  GI bleed / SNOMED CT 516527308 / Confirmed  admission  Essential (primary) hypertension / SNOMED CT 17764113 / Confirmed  Chronic neck pain / SNOMED CT 5756468967 / Confirmed  Chronic back pain / SNOMED CT 400225791 / Confirmed  Binge eating / SNOMED CT 493317030 / Confirmed  Anxiety disorder / SNOMED CT 402576387 / Confirmed      Histories   Past Medical History:    Active  GI bleed (914976516): Onset on 8/1/2021 at 56 years.  Comments:  12/1/2021 CST 8:57 AM CST - Dewayne DOSHI, Loraine  admission  Anxiety disorder (322987187)  Depression (9354262814)  Migraines (31858708)  Chronic neck pain (1663866593)  Obesity (4282304928)  Chronic back pain (176190619)  Essential (primary) hypertension (46489538)  Osteoarthritis, knee  (864440272)  Resolved  Inpatient stay (033633402): Onset on 8/17/2021 at 56 years.  Resolved on 8/20/2021 at 56 years.  Comments:  12/22/2021 CST 6:45 AM CST - Violet Miller  [facility], [place] - GI bleed.  ASCUS (51216333): Onset on 3/28/2008 at 42 years.  Resolved.  Comments:  2/13/2017 CST 6:18 PM CST - [place] CMA, Ella  reflex negative HPV DNA  History of chicken pox (617077737):  Resolved.  Pregnancy (879434372):  Resolved in 1991 at 25 years.  Pregnancy (792065791):  Resolved in 1994 at 28 years.   Family History:    Anxiety  Mother (Lauren)  Hypertension  Mother (Lauren)  High blood pressure  Mother (Lauren)  Arthritis  Mother (Lauren)  Migraine  Mother (Lauren)  Daughter (Julio)  Grandfather (M)  Depression  Mother (Lauren)  Breast Cancer  Mother (Lauren)  High cholesterol  Mother (Lauren)     Procedure history:    Esophagogastroduodenoscopy (SNOMED CT 595826284) performed by Fabrice Burrell DO on 8/19/2021 at 56 Years.  Comments:  11/4/2021 2:42 PM LACEYT - Violet Miller  Dx: Hemorrhagic anemia, hematochezia, melena.  Colonoscopy (SNOMED CT 437459476) performed by Fabrice Burrell DO on 8/19/2021 at 56 Years.  Comments:  11/4/2021 2:51 PM Violet Rebolledo  Indication: Hematochezia, melena, post hemorrhagic anemia.   Sedation: Fentanyl, Midazolam.   Impression: Moderate diverticulosis in sigmoid colon.  Blood in transverse colon, at hepatic flexure, in ascending colon and cecum.  Blood in distal ileum and terminal ileum.  Esophagogastroduodenoscopy (SNOMED CT 523838278) performed by Fabrice Burrell DO on 8/18/2021 at 56 Years.  Comments:  11/4/2021 2:40 PM LACEYT  Violet Miller  Dx: Abdominal pain, hematemesis, hematochezia, melena, nausea and vomiting.  Cholecystectomy (SNOMED CT 42092227) in 2019 at 54 Years.  GI - Gastrointestinal bleed (SNOMED CT 0378656300) in the month of 8/2018 at 53 Years.  Comments:  10/28/2021 10:53 AM LUZ - Angela Hood LPN  endoscopy, capsule endoscopy, balloon  theray    10[facility]28[facility]2021 10:52 [facility]  Ultrasound (SNOMED CT 560782984) on 11/11/2011 at 46 Years.  Comments:  2/13/2017 5:53 PM Ella Ye CMA  abdominal u/s   conclusion: Several calcified splenic granulomas, benign etiology. No additional acute abdominal ultrasound abnormality.  Plain X-ray lumbar spine normal (SNOMED CT 005675616) on 7/18/2011 at 46 Years.  Comments:  2/13/2017 5:57 PM Ella Ye CMA  within normal limits for age  CT of abdomen and pelvis (SNOMED CT 5296291536) on 2/19/2011 at 45 Years.  Comments:  2/13/2017 5:59 PM Ella Ye CMA  conclusion:   1. No definite renal calculi, renal mass or obstructive process.  2. No distinct abdominal or pelvic mass.  X-ray of abdomen (SNOMED CT 422512428) on 7/28/2008 at 43 Years.  Comments:  2/13/2017 6:03 PM Ella Ye CMA  Impression: The most inferior medial right pelvic calcification could represent te right ureterovesical junction stone but there was a second calcification at this level on the CT compatible with phlebolith. It is indeterminate whether this calcification represents the phlebolith or the ureteral stone at the same approximate level.  Ultrasound (SNOMED CT 341565594) on 7/17/2008 at 43 Years.  Comments:  2/13/2017 6:11 PM Ella Ye CMA  u/s abdomen  Impression:   1. Contracted gallbladder without evidence of cholelithiasis. No convincing evidence of cholecystitis.  2. Mild right-sided hydronephrosis. This is consistent with a distal right ureteral calculus demonstrated on subsequent CT scan.  CT of abdomen and pelvis (SNOMED CT 2516422120) on 7/17/2008 at 43 Years.  Comments:  2/13/2017 6:15 PM Ella Ye CMA  Impression:   1. 3 mm calculus at the distal right ureterovesicular junction with mild proximal hydronephrosis.  2. No other renal, ureteral or bladder calculi are demonstrated .  MRI of cervical spine (SNOMED CT 440388271) on  10/26/2007 at 42 Years.  Comments:  2/13/2017 6:31 PM NATHALIA - Lizz BOJORQUEZ Ella  Conclusion:  1. Moderate C5-6 degenerative disc disease with a 2.5 mm right posterolateral and foraminal disc herniation. This contacts the ventral cord with moderate foraminal encroachment and right C6 nerve root impingement.  2. A midline annular fissure and bulge at C4-5 contacts the ventral surface of the cord.  lipoma removal on back.  avm removal L knee.   Social History:        Electronic Cigarette/Vaping Assessment            Electronic Cigarette Use: Never.      Alcohol Assessment            Never      Tobacco Assessment            Former smoker, quit more than 30 days ago, Cigarettes, Household tobacco concerns: No.      Substance Abuse Assessment            Never            Never      Employment and Education Assessment            Employed, Work/School description: RN Clinical Director.  Highest education level: University degree(s).      Home and Environment Assessment            Marital status: .  Spouse/Partner name: Alan.  2 children.  Living situation: Home/Independent.  Feels               unsafe at home: No.  Family/Friends available for support: Yes.            Marital status: .  Spouse/Partner name: Alan.  Living situation: Home/Independent.  Feels unsafe at               home: No.  Family/Friends available for support: Yes.      Nutrition and Health Assessment            Type of diet: Regular.            Type of diet: Regular.  Wants to lose weight: Yes.  Sleeping concerns: Yes.  Feels highly stressed: No.      Exercise and Physical Activity Assessment            Exercise frequency: Daily.  Exercise type: Walking, Weight lifting, cardio.            Exercise frequency: Daily.  Exercise type: Walking.      Sexual Assessment            Sexually active: Yes.  Identifies as female, Sexual orientation: Straight or heterosexual.  History of STD:               No.  Contraceptive Use Details:  had  vasectomy.  History of sexual abuse: No.

## 2022-02-16 NOTE — LETTER
(Inserted Image. Unable to display)     April 15, 2019      LULU LARA  [address]  [place], [place] [id number]          Dear LULU,      Thank you for selecting [facility] (previously [facility]) for your healthcare needs.      Our records indicate you are due for the following services:     Hypertension check ~ please remember to bring your at-home blood pressure readings with you to your appointment.       To schedule an appointment or if you have further questions, please contact your primary clinic:   [facility]       [phone]   [facility]       [phone]              [facility]     [phone]      Powered by [employer]    Sincerely,    Healthcare Providers at [facility]

## 2022-02-16 NOTE — NURSING NOTE
Comprehensive Intake Entered On:  2/4/2019 5:34 PM CST    Performed On:  2/4/2019 5:29 PM CST by Morelia Berumen MA               Summary   Chief Complaint :   Here for fu with Lomaira for weight loss and also follow up bilateral knee pain, worse in the left knee    Menstrual Status :   Postmenopausal   Weight Measured :   216.4 lb(Converted to: 216 lb 6 oz, 98.16 kg)    Height Measured :   66.5 in(Converted to: 5 ft 6 in, 168.91 cm)    Body Mass Index :   34.4 kg/m2 (HI)    Body Surface Area :   2.14 m2   Systolic Blood Pressure :   130 mmHg   Diastolic Blood Pressure :   80 mmHg   Mean Arterial Pressure :   97 mmHg   Peripheral Pulse Rate :   76 bpm   BP Site :   Right arm   Pulse Site :   Radial artery   Oxygen Saturation :   98 %   Morelia Berumen MA - 2/4/2019 5:29 PM CST   Health Status   Allergies Verified? :   Yes   Medication History Verified? :   Yes   Medical History Verified? :   No   Pre-Visit Planning Status :   Completed   Tobacco Use? :   Former smoker   Morelia Berumen MA - 2/4/2019 5:29 PM CST   Consents   Consent for Immunization Exchange :   Consent Granted   Consent for Immunizations to Providers :   Consent Granted   Morelia Berumen MA - 2/4/2019 5:29 PM CST   Meds / Allergies   (As Of: 2/4/2019 5:34:22 PM CST)   Allergies (Active)   No known allergies  Estimated Onset Date:   Unspecified ; Created By:   Morelia Acuña CMA; Reaction Status:   Active ; Category:   Drug ; Substance:   No known allergies ; Type:   Allergy ; Updated By:   Morelia Acuña CMA; Reviewed Date:   10/12/2018 12:09 PM CDT        Medication List   (As Of: 2/4/2019 5:34:22 PM CST)   Prescription/Discharge Order    phentermine  :   phentermine ; Status:   Prescribed ; Ordered As Mnemonic:   Lomaira 8 mg oral tablet ; Simple Display Line:   See Instructions, take 30 minutes before meals (TID), 90 tab(s), 0 Refill(s) ; Ordering Provider:   Misty Davila; Catalog Code:   phentermine ; Order Dt/Tm:    11/12/2018 8:23:59 PM          acetaminophen-butalbital  :   acetaminophen-butalbital ; Status:   Prescribed ; Ordered As Mnemonic:   acetaminophen-butalbital 325 mg-50 mg oral tablet ; Simple Display Line:   2 tab(s), PO, q4hr, PRN: for headache, 60 tab(s), 0 Refill(s) ; Ordering Provider:   Misty Davila; Catalog Code:   acetaminophen-butalbital ; Order Dt/Tm:   10/12/2018 10:21:27 AM          hydroCHLOROthiazide  :   hydroCHLOROthiazide ; Status:   Prescribed ; Ordered As Mnemonic:   hydroCHLOROthiazide 12.5 mg oral capsule ; Simple Display Line:   12.5 mg, 1 cap(s), PO, Daily, 90 cap(s), 3 Refill(s) ; Ordering Provider:   Misty Davila; Catalog Code:   hydroCHLOROthiazide ; Order Dt/Tm:   10/12/2018 10:21:06 AM          phentermine-topiramate  :   phentermine-topiramate ; Status:   Processing ; Ordered As Mnemonic:   Qsymia 7.5 mg-46 mg oral capsule, extended release ; Ordering Provider:   Misty Davila; Action Display:   Complete ; Catalog Code:   phentermine-topiramate ; Order Dt/Tm:   2/4/2019 5:32:04 PM          phentermine-topiramate  :   phentermine-topiramate ; Status:   Processing ; Ordered As Mnemonic:   Qsymia 3.75 mg-23 mg oral capsule, extended release ; Ordering Provider:   Misty Davila; Action Display:   Complete ; Catalog Code:   phentermine-topiramate ; Order Dt/Tm:   2/4/2019 5:32:04 PM          bupropion-naltrexone  :   bupropion-naltrexone ; Status:   Processing ; Ordered As Mnemonic:   Contrave 8 mg-90 mg oral tablet, extended release ; Ordering Provider:   Misty Davila; Action Display:   Complete ; Catalog Code:   bupropion-naltrexone ; Order Dt/Tm:   2/4/2019 5:32:10 PM          acetaminophen-hydrocodone  :   acetaminophen-hydrocodone ; Status:   Processing ; Ordered As Mnemonic:   Norco 5 mg-325 mg oral tablet ; Ordering Provider:   Misty Davila; Action Display:   Complete ; Catalog Code:   acetaminophen-hydrocodone ; Order Dt/Tm:   2/4/2019  5:32:14 PM          celecoxib  :   celecoxib ; Status:   Prescribed ; Ordered As Mnemonic:   celecoxib 200 mg oral capsule ; Simple Display Line:   200 mg, 1 cap(s), PO, Daily, 90 cap(s), 3 Refill(s) ; Ordering Provider:   Misty Davila; Catalog Code:   celecoxib ; Order Dt/Tm:   3/9/2018 9:14:22 AM          busPIRone  :   busPIRone ; Status:   Processing ; Ordered As Mnemonic:   busPIRone 10 mg oral tablet ; Ordering Provider:   Misty Davila; Action Display:   Complete ; Catalog Code:   busPIRone ; Order Dt/Tm:   2/4/2019 5:32:28 PM          escitalopram  :   escitalopram ; Status:   Prescribed ; Ordered As Mnemonic:   Lexapro 20 mg oral tablet ; Simple Display Line:   20 mg, 1 tab(s), PO, Daily, 90 tab(s), 3 Refill(s) ; Ordering Provider:   Misty Davila; Catalog Code:   escitalopram ; Order Dt/Tm:   3/9/2018 9:09:24 AM          acetaminophen-hydrocodone  :   acetaminophen-hydrocodone ; Status:   Processing ; Ordered As Mnemonic:   Norco 5 mg-325 mg oral tablet ; Ordering Provider:   Misty Davila; Action Display:   Complete ; Catalog Code:   acetaminophen-hydrocodone ; Order Dt/Tm:   2/4/2019 5:32:38 PM          celecoxib  :   celecoxib ; Status:   Prescribed ; Ordered As Mnemonic:   celecoxib 100 mg oral capsule ; Simple Display Line:   200 mg, 2 cap(s), po, bid, for 30 day(s), 120 cap(s), 1 Refill(s) ; Ordering Provider:   Loraine Klely; Catalog Code:   celecoxib ; Order Dt/Tm:   6/26/2017 9:07:46 AM            Home Meds    ALPRAZolam  :   ALPRAZolam ; Status:   Documented ; Ordered As Mnemonic:   Xanax 0.25 mg oral tablet ; Simple Display Line:   0.25 mg, 1 tab(s), PO, TID, PRN: for anxiety, 0 Refill(s) ; Catalog Code:   ALPRAZolam ; Order Dt/Tm:   2/6/2017 6:05:59 PM          multivitamin with minerals  :   multivitamin with minerals ; Status:   Documented ; Ordered As Mnemonic:   Daily Multi ; Simple Display Line:   1 tab(s), po, daily, 0 Refill(s) ; Catalog Code:    multivitamin with minerals ; Order Dt/Tm:   2/6/2017 6:05:33 PM          ZOLMitriptan  :   ZOLMitriptan ; Status:   Documented ; Ordered As Mnemonic:   Zomig 5 mg oral tablet ; Simple Display Line:   5 mg, 1 tab(s), PO, Once, PRN: for migraine headache, 10 tab(s), 0 Refill(s) ; Catalog Code:   ZOLMitriptan ; Order Dt/Tm:   2/6/2017 6:03:41 PM          cholecalciferol  :   cholecalciferol ; Status:   Documented ; Ordered As Mnemonic:   Vitamin D3 5000 intl units oral capsule ; Simple Display Line:   5,000 International Unit, 1 cap(s), po, daily ; Catalog Code:   cholecalciferol ; Order Dt/Tm:   2/14/2013 3:19:39 PM

## 2022-02-16 NOTE — TELEPHONE ENCOUNTER
Entered by Bryanna Cobian CMA on April 15, 2019 11:55:46 AM CDT  PCP:   N/A RAMAKRISHNA     Medication:   Celecoxib  Last Filled:  3/18/19    Quantity: 30 Refills:  0    Date of last office visit and reason:   2/4/19 anxiety   Date of last labs pertaining to condition:  n/a    Note:  Please advise on refill. Patient is to [facility] 04/2019- unsure for what. [facility] patient     Return to Clinic order placed?  yes    Resource:   [employer]  Phone:   [phone]      ------------------------------------------  From: [employer] [id number]  To: Misty Davila  Sent: April 14, 2019 10:19:25 AM CDT  Subject: Medication Management  Due: April 15, 2019 10:19:25 AM CDT    ** On Hold Pending Signature **  Drug: celecoxib (celecoxib 200 mg oral capsule)  TAKE 1 CAPSULE BY MOUTH EVERY DAY  Quantity: 30 cap(s)     Days Supply: 30        Refills: 0  Substitutions Allowed  Notes from Pharmacy:     Dispensed Drug: celecoxib (celecoxib 200 mg oral capsule)  TAKE 1 CAPSULE BY MOUTH EVERY DAY  Quantity: 30 cap(s)     Days Supply: 30        Refills: 0  Substitutions Allowed  Notes from Pharmacy:   ---------------------------------------------------------------  From: Bryanna Cobian CMA (eRx Pool ([id number]_[place] [employer]))   To: [employer] Message Pool ([id number]_[place] [employer]);     Sent: 4/15/2019 11:55:56 AM CDT  Subject: FW: Medication Management   Due Date/Time: 4/15/2019 10:19:00 AM CDT---------------------  From: Edwige Jones MA   To: [employer] [id number]    Sent: 4/15/2019 1:27:16 PM CDT  Subject: FW: Medication Management     ** Submitted: **  Order:celecoxib (celecoxib 200 mg oral capsule)  1 cap(s)  Oral  daily  Qty:  30 cap(s)        Days Supply:  30        Refills:  0          Substitutions Allowed     Route To Pharmacy - [employer] [id number]    Signed by Edwige Jones MA  4/15/2019 1:26:00 PM    ** Submitted: **  Complete:celecoxib (celecoxib 200 mg oral capsule)   Signed by Edwige Jones MA  4/15/2019 1:27:00 PM    ** Not  Approved:  **  celecoxib (CELECOXIB 200MG CAPSULES)  TAKE 1 CAPSULE BY MOUTH EVERY DAY  Qty:  30 cap(s)        Days Supply:  30        Refills:  0          Substitutions Allowed     Route To Pharmacy - [facility] [id number]   Signed by Edwige Jones MA

## 2022-02-16 NOTE — PROGRESS NOTES
"   Patient:   LULU LARA            MRN: [id number]            FIN: [id number]               Age:   51 years     Sex:  Female     :  1965   Associated Diagnoses:   Obesity   Author:   Lyssa Kang      Visit Information   Visit type:  Medical Nutrition Therapy.    Referral source:  Misty Davila.       Chief Complaint   Obesity BMI 33       Interval History   Pt is here today for nutrition education to promote weight loss and discuss weight loss medication.    Pt is very frustered with her weight she states her usual weight prior to menopause was 130's#  Intake: Pt states she is \"addicted\" to sugar, enjoys sweets and frequently will overeat them, also drinks 2 cans of regular soda per day.  States she is a picky eater and does not like vegetables, likes most fruit, does not drink juice, high intake of starches (not always whole grain), will eat protein with noon and evening meals, occ eggs for brunch  Exercise: has been none BUT, joined a fitness center today!  Pt is willing to go before or after work.    Stress: Pt is eating due to stress, emotion, boredom      Health Status   Allergies:    Allergic Reactions (Selected)  No known allergies   Medications:  (Selected)   Prescriptions  Prescribed  CeleBREX 100 mg oral capsule: 2 cap(s) ( 200 mg ), PO, BID, Instructions: for neck and LBP, PRN: for pain, # 120 cap(s), 0 Refill(s), Type: Maintenance, Pharmacy: [id number], 2 cap(s) po bid,PRN:for pain,Instr:for neck and LBP  Lexapro 10 mg oral tablet: 1 tab(s) ( 10 mg ), PO, Daily, # 90 tab(s), 1 Refill(s), Type: Maintenance, Pharmacy: [id number], 1 tab(s) po daily  Norco 5 mg-325 mg oral tablet: 1 tab(s), po, q6 hrs, Instructions: for neck and bilateral knee pain, PRN: as needed for pain, # 120 tab(s), 0 Refill(s), Type: Maintenance, Pharmacy: [facility] [id number], 1 tab(s) po q6 hrs,PRN:as needed for pain,Instr:for neck and bilateral k...  Documented " Medications  Documented  Daily Multi: 1 tab(s), po, daily, 0 Refill(s), Type: Maintenance  Vitamin D3 5000 intl units oral capsule: 1 cap(s) ( 5,000 International Unit ), po, daily, 0 Refill(s), Type: Maintenance  Xanax 0.25 mg oral tablet: 1 tab(s) ( 0.25 mg ), PO, TID, PRN: for anxiety, 0 Refill(s), Type: Maintenance  Zomig 5 mg oral tablet: 1 tab(s) ( 5 mg ), PO, Once, PRN: for migraine headache, # 10 tab(s), 0 Refill(s), Type: Maintenance  gabapentin 300 mg oral capsule: 2 cap(s) ( 600 mg ), po, tid, 0 Refill(s), Type: Maintenance  hydrochlorothiazide 25 mg oral tablet: 1 tab(s) ( 25 mg ), po, daily, 0 Refill(s), Type: Maintenance   Problem list:    All Problems (Selected)  Anxiety disorder / SNOMED CT 707188283 / Confirmed  Chronic back pain / SNOMED CT 669449335 / Confirmed  Chronic neck pain / SNOMED CT 0015882517 / Confirmed  Migraines / SNOMED CT 25757397 / Confirmed  Depression / SNOMED CT 1614031875 / Confirmed  Obesity / SNOMED CT 9493358572 / Probable      Histories   Past Medical History:    Active  Anxiety disorder (787133090)  Depression (7772792512)  Migraines (98431211)  Chronic neck pain (0380513439)  Chronic back pain (993764519)  Resolved  ASCUS (78855257): Onset on 3/28/2008 at 42 years.  Resolved.  Comments:  2/13/2017 CST 6:18 PM CST - Ella Zamudio CMA  reflex negative HPV DNA   Family History:    Breast Cancer  Mother     Procedure history:    Ultrasound (SNOMED CT 190497704) on 11/11/2011 at 46 Years.  Comments:  2/13/2017 5:53 PM Ella Todd CMA  abdominal u/s   conclusion: Several calcified splenic granulomas, benign etiology. No additional acute abdominal ultrasound abnormality.  Plain X-ray lumbar spine normal (SNOMED CT 339983126) on 7/18/2011 at 46 Years.  Comments:  2/13/2017 5:57 PM - Ella Zamudio CMA  within normal limits for age  CT of abdomen and pelvis ([facility] CT 1806204391) on 2/19/2011 at 45 Years.  Comments:  2/13/2017 5:59 PM - Ella Zamudio CMA  conclusion:    1. No definite renal calculi, renal mass or obstructive process.  2. No distinct abdominal or pelvic mass.  X-ray of abdomen (SNOMED CT 216516989) on 7/28/2008 at 43 Years.  Comments:  2/13/2017 6:03 PM Ella Todd CMA  Impression: The most inferior medial right pelvic calcification could represent te right ureterovesical junction stone but there was a second calcification at this level on the CT compatible with phlebolith. It is indeterminate whether this calcification represents the phlebolith or the ureteral stone at the same approximate level.  Ultrasound (SNOMED CT 051060643) on 7/17/2008 at 43 Years.  Comments:  2/13/2017 6:11 PM - Ella Zamudio CMA  u/s abdomen  Impression:   1. Contracted gallbladder without evidence of cholelithiasis. No convincing evidence of cholecystitis.  2. Mild right-sided hydronephrosis. This is consistent with a distal right ureteral calculus demonstrated on subsequent CT scan.  CT of abdomen and pelvis (SNOMED CT 7983583258) on 7/17/2008 at 43 Years.  Comments:  2/13/2017 6:15 PM - Ella Zamudio CMA  Impression:   1. 3 mm calculus at the distal right ureterovesicular junction with mild proximal hydronephrosis.  2. No other renal, ureteral or bladder calculi are demonstrated .  MRI of cervical spine (SNOMED CT 819180206) on 10/26/2007 at 42 Years.  Comments:  2/13/2017 6:31 PM Ella Todd CMA  Conclusion:  1. Moderate C5-6 degenerative disc disease with a 2.5 mm right posterolateral and foraminal disc herniation. This contacts the ventral cord with moderate foraminal encroachment and right C6 nerve root impingement.  2. A midline annular fissure and bulge at C4-5 contacts the ventral surface of the cord.  lipoma removal on back.  avm removal L knee.   Social History:        Alcohol Assessment: Denies Alcohol Use            Never      Tobacco Assessment: Denies Tobacco Use            Never      Substance Abuse Assessment: Denies Substance Abuse            Never       Employment and Education Assessment            Employed, Work/School description: Clinical Director.      Home and Environment Assessment            Spouse/Partner name: Alan.      Nutrition and Health Assessment            Type of diet: Regular.      Exercise and Physical Activity Assessment            Exercise type: Bicycling.                     Comments:                      01/21/2013 - Morelia Acuña CMA                     3 x weekly      Sexual Assessment            Sexually active: Yes.  Sexual orientation: Heterosexual.  Contraceptive Use Details: Vaginal ring.        Physical Examination   Measurements from flowsheet : Measurements   2/20/2017 4:53 PM CST Height Measured - Standard 66 in    Weight Measured - Standard 203 lb    BSA 2.07 m2    Body Mass Index 32.76 kg/m2         Review / Management   Results review:  Lab results   2/6/2017 6:50 PM CST Sodium Level 140 mmol/L    Potassium Level 4.0 mmol/L    Chloride Level 104 mmol/L    CO2 Level 26 mmol/L    Glucose Level 88 mg/dL    BUN 20 mg/dL    Creatinine 0.77 mg/dL    BUN/Creat Ratio NOT APPLICABLE    eGFR 91 mL/min/1.73m2    eGFR African American 105 mL/min/1.73m2    Calcium Level 9.5 mg/dL    Bili Total 0.3 mg/dL    Bili Direct 0.0 mg/dL    Bili Indirect 0.3    Alk Phos 133 unit/L  HI    AST/SGOT 20 unit/L    ALT/SGPT 26 unit/L    Protein Total 7.4 gm/dL    Albumin Level 4.5 gm/dL    Globulin 2.9    A/G Ratio 1.6    Hgb A1c 5.5   .       Impression and Plan   Diagnosis     Obesity (KAI94-DC E66.9).       Today patient was instructed on the importance of how nutrition and physical activity can impact weight status and improve overall health conditioning including improving self-esteem and increasing energy level.  We discussed why it is important to incorporate healthy lifestyle interventions to control overall health to prevent complications of being overweight including the potential of developing diabetes and preventing heart disease.   We  discussed the food plate method.  Patient will be starting 1200 - 1400 calorie meal plan with appropriate portion sizes.  We discussed how to read food labels.  Patient is shown appropriate portion sizes of a variety of foods.  Discussed mindful eating, savoring food, listening to body, meal replacement drinks and weight loss tips.  We discussed how calories in should be less than calories expended to achieve optimal weight loss.  I also stressed the importance of incorporating physical activity at least 5 days per week with minimum of 30-60 minutes of moderately intense activity to promote weight loss.  Patient is given reference to sparkpeople.com/ myfitnesspal.com or apps and encouraged to track daily intake of food and fluids     Today the patient is instructed on Saxenda: proper use, mechanism of action, indications, dosing, injection schedule, safety and side effects.  Pt is shown a demonstration of the use of the pen and was able to return demonstration without difficulty.  Instruction: 0.6 x 1 week then 1.2 mg x 1 week, 1.8 mg x 1 week, 2.4 mg x 1 week then 3 mg from then on.     Goals:   1.  Practice healthy stress management and get good quality sleep with the goal of 7-8 hours per night.    2.  Increase physical activity and make this a part of a daily routine.  Recommend 30 minutes of moderately intense physical activity 5 or more days per week.  Stay physically active on a daily basis and throughout the year.  Recommend a pedometer; gradually increase the average to a minimum of 6000 steps with the ultimate goal of 10,000 steps per day.    3.  Eat in a healthy way, per food plate method .  Keep a food record.  Eat 3 meals/ day.  A meal is three or more food groups; make it colorful for better nutrition.  Focus on portion control.  1200 - 1400 calorie meal plan.  Follow weight loss tips and mindful eating.  eliminate sweetened beverages, 3 (3/4 cup servings of fruit max/ day), Vitamins (Vit D, MVI),  increase nonstarchy vegetables as able   4.  Goal weight 199# in 16 weeks (lose 4% of body wt and if not d/c medication)  5.  Read handouts provided.  f/u in 1 week    6.  start Saxenda - take in am  Instruction:  Dose Escalation Schedule   Week  Daily Dose    1  0.6 mg    2  1.2 mg    3  1.8 mg    4  2.4 mg    5 and onward  3 mg          Professional Services   Time spent with pt 30 min   cc Misty CASTRO

## 2022-02-16 NOTE — TELEPHONE ENCOUNTER
---------------------From: Stephanie Gaston LPN (Phone BusyEvent Pool ([id number]_[facility]) To: Aster Polk;   Sent: 4/8/2019 9:25:00 AM CDTSubject: FW: Lab results ---------------------From: LULU Villasenor: Critical access hospitalSent: 04/08/2019 09:12 a.m. CDTSubject: Lab resultsHello,I sent over the authorization for lab results last week. I wanted to be sure you received it? I am attaching again in case it was not received.Karlene---------------------From: Jasmin Jain To: Phone Messages Pool ([id number]_[facility]);   Sent: 4/8/2019 9:37:24 AM CDTSubject: RE: Lab results I called Karlene this morning and let her know that we got the authorization and that the lab results were faxed on 4/4/2019.

## 2022-02-16 NOTE — TELEPHONE ENCOUNTER
---------------------  From: LULU LARA  To: [facility]  Sent: 04/04/2019 12:12 p.m. CDT  Subject: Lab results  Hello,  How do I go about getting my lab results from the last year at [facility] [place]?  Karlene

## 2022-02-16 NOTE — TELEPHONE ENCOUNTER
---------------------  From: Rosalind Gonzalez CMA (Phone Messages Pool ([id number]_[facility]))   To: Aster Polk;     Sent: 4/4/2019 12:30:03 PM CDT  Subject: FW: Lab results           ---------------------  From: ANGELIQUE LARA  To: [facility]  Sent: 04/04/2019 12:12 p.m. CDT  Subject: Lab results  Hello,  How do I go about getting my lab results from the last year at [facility]?  Karlene---------------------  From: Aster Polk   To: Phone Contractually Pool ([id number]_[place] [facility] [place]);     Sent: 4/4/2019 1:08:22 PM CDT  Subject: RE: Lab results     I have sent Angelique a portal message letting her know how to obtain these records. thanks

## 2022-02-16 NOTE — TELEPHONE ENCOUNTER
---------------------  From: Seda Oliva CMA (eRx Pool ([id number]_[facility]))   To: [facility] [place] ([id number]_[facility] [place]);     Sent: 3/15/2019 1:01:14 PM CDT  Subject: FW: Medication Management   Due Date/Time: 3/16/2019 12:17:00 PM CDT     Medication Refill needing approval    PCP:   RAMAKRISHNA    Medication:   celebrex  Last Filled:  3/9/18    Quantity:  90  Refills:  3  CSA on file?   no     Date of last office visit and reason:   2/4/19; anxiety / obesity  Date of last labs pertaining to condition:  10/12/18    Return to Clinic order placed?  yes; due - reminder letter sent this week        ** Patient matched by Seda Oliva CMA on 3/15/2019 12:59:14 PM CDT **      ------------------------------------------  From: [place] [id number]  To: Misty Davila  Sent: March 15, 2019 12:17:49 PM CDT  Subject: Medication Management  Due: March 16, 2019 12:17:49 PM CDT    ** On Hold Pending Signature **  Drug: celecoxib (celecoxib 200 mg oral capsule)  TAKE 1 CAPSULE BY MOUTH EVERY DAY  Quantity: 30 cap(s)     Days Supply: 30        Refills: 0  Substitutions Allowed  Notes from Pharmacy:     Dispensed Drug: celecoxib (celecoxib 200 mg oral capsule)  TAKE 1 CAPSULE BY MOUTH EVERY DAY  Quantity: 30 cap(s)     Days Supply: 30        Refills: 0  Substitutions Allowed  Notes from Pharmacy:   ---------------------------------------------------------------  From: Ella Zamudio CMA ([facility] [place] ([id number]_[facility] [place]))   To: Misty Davila;     Sent: 3/18/2019 4:03:00 PM CDT  Subject: FW: Medication Management   Due Date/Time: 3/16/2019 12:17:00 PM CDT---------------------  From: Misty Davila   To: [facility] [email or web address] [id number]    Sent: 3/18/2019 4:33:14 PM CDT  Subject: FW: Medication Management     ** Submitted: **  Complete:celecoxib (celecoxib 200 mg oral capsule)   Signed by Misty Davila  3/18/2019 4:33:00 PM    ** Submitted: **  Complete:celecoxib (celecoxib 100 mg oral capsule)   Signed  by Misty Davila  3/18/2019 4:33:00 PM    ** Approved **  celecoxib (CELECOXIB 200MG CAPSULES)  TAKE 1 CAPSULE BY MOUTH EVERY DAY  Qty:  30 cap(s)        Days Supply:  30        Refills:  0          Substitutions Allowed     Route To Pharmacy - [facility] [id number]

## 2022-02-16 NOTE — PROGRESS NOTES
Patient:   LULU LARA            MRN: [id number]            FIN: [id number]               Age:   56 years     Sex:  Female     :  1965   Associated Diagnoses:   Chronic back pain; Chronic neck pain; History of GI bleed; HTN (hypertension); Osteoarthritis, knee   Author:   Loraine Kelly      Visit Information      Date of Service: 2021 06:30 am  Performing Location: [facility]  Encounter#: [id number]      Primary Care Provider (PCP):  Loraine Kelly    NPI# [id number]      Referring Provider:  Loraine Kelly NPI# [id number]   Visit type:  Telephone Encounter.    Source of history:  Patient.    Location of patient:  _home  Call Start Time:   _110  Call End Time:    _122      Chief Complaint   2021 12:53 PM CST  f/u on start of Butran patches and started back up on HCTZ for BP 10/2021 - verbal consent for telephone visit     _      History of Present Illness   Today's visit was conducted via telephone due to the COVID-19 pandemic. Patient's consent to telephone visit was obtained and documented.      Reason for visit:  _  10/28/2021   She does need something for pain control in her knees, both will require knee replacements, she is considering this next summer. She is using TYlenol 1000mg bid with no relate, has voltaren gel with minimal relief, has used gabapentin in past for HAs with some side effects that affect her mentation and she would jprefer not to use that again.  Does not want opiods but tramadol a couple times a day in the past has been helpful. She would consider Butrans patch with a little more information.  She uses warm shower in the morning, ice and walking hourly to help with pain    She has hx of HTN, last few bps have been high.  Will restart HCTZ at lower dose than before and emphasized potassium intake    2021  She is monitoring bp at home and still 140 systolic, 70 diastolic. No side effects. Will increase to full 25 mg daily  HCTZ  Butrans is helping but only a small amount. No using the Tramadol. is using 1300mg Acetaminophen twice a day. Will increase Butrans  The [place] Prescription Drug Monitoring Program website queried today, no concerns      Impression and Plan   Diagnosis     Chronic back pain (REA10-CJ M54.9).     Chronic neck pain (SAK55-HO M54.2).     History of GI bleed (QHE19-EP Z87.19).     HTN (hypertension) (SOS21-LN I10).     Osteoarthritis, knee (PAW42-PQ M17.10).     Course:  Improving.    Plan:  increase HCTZ to 25m daily, monitor bp  Increase butrans  follow up in clinic in 1 month.    Patient Instructions:       Counseled: Patient.    Orders     Orders (Selected)   Prescriptions  Prescribed  Butrans 10 mcg/hr transdermal film, extended release: See Instructions, Instructions: 1 patch(es) TD every 7 days  apply to clean, dry, intact skin, # 4 EA, 0 Refill(s), Type: Maintenance, Pharmacy: [facility], 1 patch(es) TD every 7 days; apply to clean, dry, intact skin, 65.75,...  hydroCHLOROthiazide 25 mg oral tablet: = 1 tab(s) ( 25 mg ), PO, Daily, # 90 tab(s), 1 Refill(s), Type: Maintenance, Pharmacy: [facility], 65.75, in, 10/28/21 8:04:00 CDT, Height Measured, 193.9, lb, 10/28/21 8:04:00 CDT, Weight Measured.        Health Status   Allergies:    Allergic Reactions (Selected)  No Known Medication Allergies   Medications:  (Selected)   Prescriptions  Prescribed  Butrans 10 mcg/hr transdermal film, extended release: See Instructions, Instructions: 1 patch(es) TD every 7 days  apply to clean, dry, intact skin, # 4 EA, 0 Refill(s), Type: Maintenance, Pharmacy: [facility], 1 patch(es) TD every 7 days; apply to clean, dry, intact skin, 65.75,...  Lexapro 20 mg oral tablet: = 1 tab(s) ( 20 mg ), PO, Daily, # 90 tab(s), 1 Refill(s), Type: Maintenance, Pharmacy: [facility], 1 tab(s) Oral daily, 65.75, in, 10/28/21 8:04:00 CDT,  Height Measured, 193.9, lb, 10/28/21 8:04:00 CDT, Weight Measured  Slow Fe (as elemental iron) 45 mg oral tablet, extended release: = 1 tab(s) ( 45 mg ), Oral, daily, Instructions: to replace current iron supplement, # 90 tab(s), 1 Refill(s), Type: Maintenance, Pharmacy: [facility], 1 tab(s) Oral daily,Instr:to replace current iron supplement, 65.75, in, 1...  Zomig 5 mg oral tablet: = 1 tab(s) ( 5 mg ), PO, Once, PRN: for migraine headache, # 12 tab(s), 2 Refill(s), Type: Soft Stop, Pharmacy: [facility], 1 tab(s) Oral once,PRN:for migraine headache, 65.75, in, 10/28/21 8:04:00 CDT, Height Measured, 193.9,...  hydroCHLOROthiazide 25 mg oral tablet: = 1 tab(s) ( 25 mg ), PO, Daily, # 90 tab(s), 1 Refill(s), Type: Maintenance, Pharmacy: [facility], 65.75, in, 10/28/21 8:04:00 CDT, Height Measured, 193.9, lb, 10/28/21 8:04:00 CDT, Weight Measured  omeprazole 20 mg oral delayed release capsule: = 1 cap(s) ( 20 mg ), Oral, daily, # 90 EA, 1 Refill(s), Type: Maintenance, Pharmacy: [facility], 1 cap(s) Oral daily, 65.75, in, 10/28/21 8:04:00 CDT, Height Measured, 193.9, lb, 10/28/21 8:04:00 CDT, Weight Measured  Documented Medications  Documented  Daily Multi: 1 tab(s), po, daily, 0 Refill(s), Type: Maintenance  ferrous fumarate 325 mg (106 mg elemental iron) oral tablet: = 1 tab(s) ( 325 mg ), Oral, daily, 0 Refill(s), Type: Maintenance,    Medications          *denotes recorded medication          Zomig 5 mg oral tablet: 5 mg, 1 tab(s), PO, Once, PRN: for migraine headache, 12 tab(s), 2 Refill(s).          Butrans 10 mcg/hr transdermal film, extended release: See Instructions, 1 patch(es) TD every 7 days  apply to clean, dry, intact skin, 4 EA, 0 Refill(s).          Lexapro 20 mg oral tablet: 20 mg, 1 tab(s), PO, Daily, 90 tab(s), 1 Refill(s).          *ferrous fumarate 325 mg (106 mg elemental iron) oral tablet: 325 mg, 1  tab(s), Oral, daily, 0 Refill(s).          Slow Fe (as elemental iron) 45 mg oral tablet, extended release: 45 mg, 1 tab(s), Oral, daily, to replace current iron supplement, 90 tab(s), 1 Refill(s).          hydroCHLOROthiazide 25 mg oral tablet: 25 mg, 1 tab(s), PO, Daily, for 90 day(s), 90 tab(s), 1 Refill(s).          *Daily Multi: 1 tab(s), po, daily, 0 Refill(s).          omeprazole 20 mg oral delayed release capsule: 20 mg, 1 cap(s), Oral, daily, 90 EA, 1 Refill(s).       Problem list:    All Problems  Osteoarthritis, knee / SNOMED CT 688704181 / Confirmed  Obesity / SNOMED CT 2841823967 / Probable  Depression / SNOMED CT 4462250141 / Confirmed  Migraines / SNOMED CT 67321979 / Confirmed  HTN (hypertension) / SNOMED CT 1284176579 / Confirmed  GI bleed / SNOMED CT 126515227 / Confirmed  Chronic neck pain / SNOMED CT 3156891782 / Confirmed  Chronic back pain / SNOMED CT 384313562 / Confirmed  Anxiety disorder / SNOMED CT 420739660 / Confirmed      Histories   Past Medical History:    Active  Anxiety disorder (177899065)  Depression (3230460686)  Migraines (21058806)  Chronic neck pain (7773490162)  Chronic back pain (977553363)  Resolved  ASCUS (10930458): Onset on 3/28/2008 at 42 years.  Resolved.  Comments:  2/13/2017 CST 6:18 PM CST - Ella Zamudio CMA  reflex negative HPV DNA  History of chicken pox (530774585):  Resolved.  Pregnancy (302694594):  Resolved in 1991 at 25 years.  Pregnancy (668126158):  Resolved in 1994 at 28 years.   Family History:    Hypertension  Mother (Lauren)  High blood pressure  Mother (Lauren)  Arthritis  Mother (Lauren)  Migraine  Mother (Lauren)  Depression  Mother (Lauren)  Breast Cancer  Mother (Lauren)  High cholesterol  Mother (Lauren)     Procedure history:    Esophagogastroduodenoscopy (SNOMED CT 537900953) performed by Fabrice Burrell DO on 8/19/2021 at 56 Years.  Comments:  11/4/2021 2:42 PM CDT - Violet Miller  Dx: Hemorrhagic anemia, hematochezia, melena.  Colonoscopy  (SNOMED CT 052882280) performed by Fabrice Burrell DO on 8/19/2021 at 56 Years.  Comments:  11/4/2021 2:51 PM Violet Rebolledo  Indication: Hematochezia, melena, post hemorrhagic anemia.   Sedation: Fentanyl, Midazolam.   Impression: Moderate diverticulosis in sigmoid colon.  Blood in transverse colon, at hepatic flexure, in ascending colon and cecum.  Blood in distal ileum and terminal ileum.  Esophagogastroduodenoscopy (SNOMED CT 945883119) performed by Fabrice Burrell DO on 8/18/2021 at 56 Years.  Comments:  11/4/2021 2:40 PM LACEYT - Violet Miller  Dx: Abdominal pain, hematemesis, hematochezia, melena, nausea and vomiting.  Cholecystectomy (SNOMED CT 72930656) in 2019 at 54 Years.  GI - Gastrointestinal bleed (SNOMED CT 0362418986) in the month of 8/2018 at 53 Years.  Comments:  10/28/2021 10:53 AM [facility] Erwin CAMPBELL [facility]  endoscopy, capsule endoscopy, balloon theray    10/28/2021 10:52 AM LUZ  Erwin CAMPBELL [facility]  Ultrasound (SNOMED CT 004840565) on 11/11/2011 at 46 Years.  Comments:  2/13/2017 5:53 PM Ella Ye CMA  abdominal u/s   conclusion: Several calcified splenic granulomas, benign etiology. No additional acute abdominal ultrasound abnormality.  Plain X-ray lumbar spine normal (SNOMED CT 356928289) on 7/18/2011 at 46 Years.  Comments:  2/13/2017 5:57 PM Ella Ye CMA  within normal limits for age  CT of abdomen and pelvis (SNOMED CT 4348500230) on 2/19/2011 at 45 Years.  Comments:  2/13/2017 5:59 PM Ella Ye CMA  conclusion:   1. No definite renal calculi, renal mass or obstructive process.  2. No distinct abdominal or pelvic mass.  X-ray of abdomen (SNOMED CT 426980976) on 7/28/2008 at 43 Years.  Comments:  2/13/2017 6:03 PM Ella Ye CMA  Impression: The most inferior medial right pelvic calcification could represent te right ureterovesical junction stone but there was a second calcification at this level on the CT compatible  with phlebolith. It is indeterminate whether this calcification represents the phlebolith or the ureteral stone at the same approximate level.  Ultrasound (SNOMED CT 595963082) on 7/17/2008 at 43 Years.  Comments:  2/13/2017 6:11 PM Ella Ye CMA  u/s abdomen  Impression:   1. Contracted gallbladder without evidence of cholelithiasis. No convincing evidence of cholecystitis.  2. Mild right-sided hydronephrosis. This is consistent with a distal right ureteral calculus demonstrated on subsequent CT scan.  CT of abdomen and pelvis (SNOMED CT 3891539810) on 7/17/2008 at 43 Years.  Comments:  2/13/2017 6:15 PM Elal Ye CMA  Impression:   1. 3 mm calculus at the distal right ureterovesicular junction with mild proximal hydronephrosis.  2. No other renal, ureteral or bladder calculi are demonstrated .  MRI of cervical spine (SNOMED CT 885452281) on 10/26/2007 at 42 Years.  Comments:  2/13/2017 6:31 PM Ella Ye CMA  Conclusion:  1. Moderate C5-6 degenerative disc disease with a 2.5 mm right posterolateral and foraminal disc herniation. This contacts the ventral cord with moderate foraminal encroachment and right C6 nerve root impingement.  2. A midline annular fissure and bulge at C4-5 contacts the ventral surface of the cord.  lipoma removal on back.  avm removal L knee.   Social History:        Electronic Cigarette/Vaping Assessment            Electronic Cigarette Use: Never.      Alcohol Assessment            Never      Tobacco Assessment            Former smoker, quit more than 30 days ago, Cigarettes, Household tobacco concerns: No.      Substance Abuse Assessment            Never            Never      Employment and Education Assessment            Employed, Work/School description: RN Clinical Director.  Highest education level: University degree(s).      Home and Environment Assessment            Marital status: .  Spouse/Partner name: Alan.  2 children.  Living situation:  Home/Independent.  Feels               unsafe at home: No.  Family/Friends available for support: Yes.            Marital status: .  Spouse/Partner name: Alan.  Living situation: Home/Independent.  Feels unsafe at               home: No.  Family/Friends available for support: Yes.      Nutrition and Health Assessment            Type of diet: Regular.            Type of diet: Regular.  Wants to lose weight: Yes.  Sleeping concerns: Yes.  Feels highly stressed: No.      Exercise and Physical Activity Assessment            Exercise frequency: Daily.  Exercise type: Walking, Weight lifting, cardio.            Exercise frequency: Daily.  Exercise type: Walking.      Sexual Assessment            Sexually active: Yes.  Identifies as female, Sexual orientation: Straight or heterosexual.  History of STD:               No.  Contraceptive Use Details: None.  History of sexual abuse: No.

## 2022-02-16 NOTE — NURSING NOTE
Generalized Anxiety Disorder Screening Entered On:  10/28/2021 9:58 AM CDT    Performed On:  10/28/2021 9:58 AM CDT by Angela Hood LPN               JUNAITA-7   JUANITA Nervous, Anxious On Edge :   Several days   JUANITA Control Worrying B :   Not at all   JUANITA Worrying Too Much :   Not at all   JUANITA Trouble Relaxing :   Several days   JUANITA Restless :   Not at all   JUANITA Easily Annoyed/Irritable :   Several days   JUANIAT Afraid :   Not at all   JUANITA Total Screening Score :   3    JUANITA Difficulty with Work, Home, Others :   Not difficult at all   Angela Hood LPN - 10/28/2021 9:58 AM CDT

## 2022-02-16 NOTE — TELEPHONE ENCOUNTER
---------------------  From: Kamille Mcallister CMA   To: LULU LARA    Sent: 5/22/2019 9:58:11 AM CDT  Subject: Due visit     Good morning,    We received a medication refill request for your Celecoxib. We have you due for an office visit. Triston Whaley is no longer practicing at our facility so you will need to establish with a new provider in order to get medication refills. I have filled the medicine for 2 weeks to give you time to find a new provider.     Please let us know if you have any questions. Otherwise, you can simply call and schedule an appointment with a different provider.     [email or web address]

## 2022-02-16 NOTE — TELEPHONE ENCOUNTER
---------------------  From: Elayne Julien LPN (Phone Messages Pool ([id number]_[facility]))   To: [facility] Message Pool ([id number]_[facility]);     Sent: 11/2/2021 7:54:31 AM CDT  Subject: CONSUMER MESSAGE FW: Message received           ---------------------  From: LULU LARA  To: [facility]  Sent: 11/01/2021 06:05 p.m. CDT  Subject: Message received  Peter Baron  I received your message and education on the Butrans patch. I will schedule a f/u in a month. Thank you! Hoping this will be a game changer. Have a nice week. Karlene---------------------  From: Angela Hood LPN ([facility] Message Pool ([id number]_[facility]))   To: Loraine Kelly;     Sent: 11/2/2021 9:40:10 AM CDT  Subject: FW: CONSUMER MESSAGE FW: Message receivednoted

## 2022-02-16 NOTE — PROGRESS NOTES
Patient:   LULU LARA            MRN: [id number]            FIN: [id number]               Age:   52 years     Sex:  Female     :  1965   Associated Diagnoses:   Chronic neck pain; Depression; Hypertension; Osteoarthritis; Well woman exam   Author:   Misty Davila      Visit Information      Date of Service: 2018 08:52 am  Performing Location: [facility]  Encounter#: [id number]      Primary Care Provider (PCP):  [id number] -UNKNOWN,    NPI# [id number]      Chief Complaint   3/9/2018 8:54 AM CST     Annual exam. Mammo to be done this month.      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  migraines: seen at [facility], things are better now that she is through menopause, excedrin is working now though it did not prior to menopause, still occasionally needs zomig 1-2 times a month  gabapentin is not being used any longer  2.  anxiety depression: lexapro is best treatment, gained 20lbs when first started on this and has gained another 20lbs, wellbutrin added on for weight control but anxiety came back, effexor has not been tried, has not needed anything to help sleep at night, trazodone has not been beneficial  walk twice a day for at least a mile, more recently has started [employer]  goes to [employer].  would like to remain on 20mg lexapro, added buspar last year.    3.  herniated disc in lower cervical spine has had steroid injections in past: norco is used for this prn, uses this 5-6x/month, celebrex has been started in past year.  Celebrex is used once a day, norco is used more for biateral knee pain, OA.    4.  knee pain arthritic changes and left patela tilted laterally, thinks knee problem is from weight  5.  tried nutrisystem, would really like to focus on weight loss , this year wants to refocus on weight.    mammogram this month, scheduled  last pap 2015: has never had an abnormal: due for next   BP elevated today, has used  HCTZ in past  is okay having colonoscopy screening         Review of Systems   Constitutional:  Negative.    Eye:  Negative.    Ear/Nose/Mouth/Throat:  Negative.    Respiratory:  Negative.    Cardiovascular:  Negative except as documented in history of present illness.    Breast:  Negative.    Gastrointestinal:  Negative.    Genitourinary:  Negative.    Gynecologic:  Negative.    Hematology/Lymphatics:  Negative.    Endocrine:  Negative.    Immunologic:  Negative.    Musculoskeletal:  Negative except as documented in history of present illness.    Integumentary:  Negative.    Neurologic:  Negative except as documented in history of present illness.    Psychiatric:  Negative except as documented in history of present illness.             Health Status   Allergies:    Allergic Reactions (Selected)  No known allergies   Medications:  (Selected)   Prescriptions  Prescribed  Lexapro 20 mg oral tablet: 1 tab(s) ( 20 mg ), PO, Daily, # 90 tab(s), 3 Refill(s), Type: Maintenance, Pharmacy: [id number], 1 tab(s) po daily  Norco 5 mg-325 mg oral tablet: 1 tab(s), po, q6 hrs, Instructions: for neck and bilateral knee pain, PRN: as needed for pain, # 120 tab(s), 0 Refill(s), Type: Maintenance, Pharmacy: [facility] [id number], 1 tab(s) po q6 hrs,PRN:as needed for pain,Instr:for neck and bilateral k...  busPIRone 10 mg oral tablet: See Instructions, Instructions: TAKE 1 TABLET BY MOUTH THREE TIMES DAILY, # 90 tab(s), 1 Refill(s), Type: Soft Stop, Pharmacy: [facility] [id number]  celecoxib 100 mg oral capsule: 2 cap(s) ( 200 mg ), po, bid, # 120 cap(s), 1 Refill(s), Type: Maintenance, Pharmacy: [facility] [id number], 2 cap(s) po bid,x30 day(s)  celecoxib 100 mg oral capsule: 2 cap(s) ( 200 mg ), po, bid, Instructions: due for physical in Feb 2018 prior to additional refills. Thanks., # 120 cap(s), 0 Refill(s), Type: Soft Stop, Pharmacy: [facility], 2 cap(s) po bid,Instr:due for  physical in Feb 201...  Documented Medications  Documented  Daily Multi: 1 tab(s), po, daily, 0 Refill(s), Type: Maintenance  Vitamin D3 5000 intl units oral capsule: 1 cap(s) ( 5,000 International Unit ), po, daily, 0 Refill(s), Type: Maintenance  Xanax 0.25 mg oral tablet: 1 tab(s) ( 0.25 mg ), PO, TID, PRN: for anxiety, 0 Refill(s), Type: Maintenance  Zomig 5 mg oral tablet: 1 tab(s) ( 5 mg ), PO, Once, PRN: for migraine headache, # 10 tab(s), 0 Refill(s), Type: Maintenance   Problem list:    All Problems (Selected)  Obesity / SNOMED CT 1350038732 / Probable  Migraines / SNOMED CT 70945022 / Confirmed  Depression / SNOMED CT 3859416094 / Confirmed  Chronic neck pain / SNOMED CT 3456414188 / Confirmed  Chronic back pain / SNOMED CT 331185315 / Confirmed  Anxiety disorder / SNOMED CT 065009270 / Confirmed      Histories   Family History:    Migraine  Mother (Lauren)  Breast Cancer  Mother (Lauren)     Procedure history:    Ultrasound ([id number]) on 11/11/2011 at 46 Years.  Comments:  2/13/2017 5:53 PM - Ella Zamudio CMA  abdominal u/s   conclusion: Several calcified splenic granulomas, benign etiology. No additional acute abdominal ultrasound abnormality.  Plain X-ray lumbar spine normal ([id number]) on 7/18/2011 at 46 Years.  Comments:  2/13/2017 5:57 PM Ella Todd CMA  within normal limits for age  CT of abdomen and pelvis ([id number]) on 2/19/2011 at 45 Years.  Comments:  2/13/2017 5:59 PM Ella Todd CMA  conclusion:   1. No definite renal calculi, renal mass or obstructive process.  2. No distinct abdominal or pelvic mass.  X-ray of abdomen ([id number]) on 7/28/2008 at 43 Years.  Comments:  2/13/2017 6:03 PM Ella Todd CMA  Impression: The most inferior medial right pelvic calcification could represent te right ureterovesical junction stone but there was a second calcification at this level on the CT compatible with phlebolith. It is indeterminate whether this calcification represents the  phlebolith or the ureteral stone at the same approximate level.  Ultrasound ([id number]) on 7/17/2008 at 43 Years.  Comments:  2/13/2017 6:11 PM - Elal Zamudio CMA  u/s abdomen  Impression:   1. Contracted gallbladder without evidence of cholelithiasis. No convincing evidence of cholecystitis.  2. Mild right-sided hydronephrosis. This is consistent with a distal right ureteral calculus demonstrated on subsequent CT scan.  CT of abdomen and pelvis ([id number]) on 7/17/2008 at 43 Years.  Comments:  2/13/2017 6:15 PM - Ella Zamudio CMA  Impression:   1. 3 mm calculus at the distal right ureterovesicular junction with mild proximal hydronephrosis.  2. No other renal, ureteral or bladder calculi are demonstrated .  MRI of cervical spine ([id number]) on 10/26/2007 at 42 Years.  Comments:  2/13/2017 6:31 PM Ella Todd CMA  Conclusion:  1. Moderate C5-6 degenerative disc disease with a 2.5 mm right posterolateral and foraminal disc herniation. This contacts the ventral cord with moderate foraminal encroachment and right C6 nerve root impingement.  2. A midline annular fissure and bulge at C4-5 contacts the ventral surface of the cord.  lipoma removal on back.  avm removal L knee.   Social History:        Alcohol Assessment: Denies Alcohol Use            Never      Tobacco Assessment: Past      Substance Abuse Assessment: Denies Substance Abuse            Never      Employment and Education Assessment            Employed, Work/School description: Clinical Director.      Home and Environment Assessment            Spouse/Partner name: Alan.      Nutrition and Health Assessment            Type of diet: Regular.      Exercise and Physical Activity Assessment: Regular exercise            Exercise frequency: Daily.  Exercise type: Walking, yoga/pilates every day.      Sexual Assessment            Sexually active: Yes.  Sexual orientation: Heterosexual.  Contraceptive Use Details: Vaginal ring.        Physical Examination    VS/Measurements   General:  Alert and oriented, No acute distress.    Eye:  Pupils are equal, round and reactive to light, Intact accommodation, Normal conjunctiva, Vision unchanged.         Periorbital area: Within normal limits.    HENT:  Normocephalic, Tympanic membranes are clear, Normal hearing, Oral mucosa is moist, No pharyngeal erythema, No sinus tenderness.    Neck:  Supple, Non-tender, No lymphadenopathy, No thyromegaly.    Respiratory:  Lungs are clear to auscultation, Respirations are non-labored, No chest wall tenderness.    Cardiovascular:  Normal rate, Regular rhythm, No murmur, No edema.    Breast:  No mass, No tenderness.    Gastrointestinal:  Soft, Non-tender, Non-distended, Normal bowel sounds, No organomegaly.    Genitourinary:  No costovertebral angle tenderness.         Labia: Bilateral, Within normal limits.         Mons pubis: WNL.         Perineum: Within normal limits.         Vulva: Within normal limits.         Urethra: Within normal limits.         Cervix: Within normal limits.         Uterus: Within normal limits.         Adnexa: Bilateral, Within normal limits.    Lymphatics:  No lymphadenopathy neck, axilla, groin.    Musculoskeletal:  Normal range of motion, Normal strength, No swelling.    Integumentary:  Warm, Dry, Pink.    Neurologic:  Alert, Oriented.    Psychiatric:  Cooperative, Appropriate mood & affect.       Review / Management   Results review:  labs done 8/2017.       Impression and Plan   Diagnosis     Chronic neck pain (CCX83-HT M54.2).     Depression (CMV33-OY F32.9).     Hypertension (PLZ45-RC I10).     Osteoarthritis (MEI80-KP M19.90).     Well woman exam (TGJ36-AZ Z01.419).     Patient Instructions:       Counseled: Patient, Regarding diagnosis, Regarding medications, Verbalized understanding.    Summary:    pap obtained  mammogram scheduled  colonoscopy referral done    depression:  lexapro and buspar refilled, I do not think the buspar is causing weight gain as  this has been issue for the past several years and her weight this year is stable    HTN:  will have her monitor at dialysis clinic, if remains elevated will start on HCTZ, she has used this in past but has also had lower K+ with this so we neymar recheck in 3 months    OA:  refill celebrex and limited prescription for norco given  pdmp shows last dispense:2/23/18 through [facility] for #10.    Orders     Orders (Selected)   Prescriptions  Prescribed  Lexapro 20 mg oral tablet: 1 tab(s) ( 20 mg ), PO, Daily, # 90 tab(s), 3 Refill(s), Type: Maintenance, Pharmacy: [facility], 1 tab(s) po daily  Norco 5 mg-325 mg oral tablet: 1 tab(s), po, q4-6 hrs, # 30 tab(s), 0 Refill(s), Type: Maintenance, Pharmacy: [facility], 1 tab(s) po q4-6 hrs  busPIRone 10 mg oral tablet: See Instructions, Instructions: TAKE 1 TABLET BY MOUTH THREE TIMES DAILY, # 90 tab(s), 5 Refill(s), Type: Soft Stop, Pharmacy: [facility], TAKE 1 TABLET BY MOUTH THREE TIMES DAILY  celecoxib 200 mg oral capsule: 1 cap(s) ( 200 mg ), PO, Daily, # 90 cap(s), 3 Refill(s), Type: Maintenance, Pharmacy: [facility], 1 cap(s) po daily  hydroCHLOROthiazide 12.5 mg oral capsule: 1 cap(s) ( 12.5 mg ), PO, Daily, # 90 cap(s), 0 Refill(s), Type: Maintenance, Pharmacy: [facility], 1 cap(s) po daily.

## 2022-02-16 NOTE — TELEPHONE ENCOUNTER
---------------------  From: Afia Pillai RN (Phone Messages Pool ([id number]_[facility]))   Sent: 5/5/2020 1:34:57 PM CDT  Subject: Phone Message     Phone Message    PCP:   MATEUS      Time of Call:  1251       Person Calling:  pt  Phone number:  [phone]    Returned call at: 1330    Note:   Spoke with patient about COVID antibody testing. She states that she was exposed to someone in late February that ended up testing positiv COVID 19. She states late February/early March she had low fever, body aches, severe HA, mild cough. Explained process and results time frame. She expressed understanding. Transferred pt to scheduling to make lab only appointment.     Last office visit and reason:  2-4-19 Obesity/Anxiety w/[facility]

## 2022-02-16 NOTE — TELEPHONE ENCOUNTER
---------------------  From: Bridgett Snyder RN (Phone Messages Pool ([address] [place] [address]))   To: Silviano Falk MD;     Sent: 11/30/2021 4:26:02 PM CST  Subject: buprenorphine patch     Pt calling stating that she went to  the prescription for Butrans 10 mcg/hr transdermal film and the pharmacy is out until Friday.  She is requesting that this prescription be sent to [facility] in [facility] in [place] so she would be able to  tonight.  [facility] is gone for the day, CR please advise.  Thank you.---------------------  From: Silviano Falk MD   To: Phone Messages Pool ([address] [place] [address]);     Sent: 11/30/2021 4:46:24 PM CST  Subject: RE: buprenorphine patch     Please forward to Loraine also let her know pt is on Vyvanse per pdmp---------------------  From: Jayshree Alexander RN (Phone Messages Pool ([address] [place] [address]))   To: [facility] Message Pool ([address] [place] [address]);     Sent: 11/30/2021 4:54:57 PM CST  Subject: FW: buprenorphine patch---------------------  From: Angela Hood LPN ([facility] Message Pool ([address] [place] [address]))   To: Loraine Kelly;     Sent: 12/1/2021 8:22:16 AM CST  Subject: FW: buprenorphine patch---------------------  From: Loraine Kelly   To: [facility] Message Pool ([address] [place] [address]);     Sent: 12/1/2021 9:37:19 AM CST  Subject: RE: buprenorphine patch     please let her know I just called [facility] in [place] and they told me the Butrans patch will be in this afternoon (TODAY), she should  the prescription there.  SHe is going to see me in clinic in 30 days and we will go through the controlled substance contract then  Of note, the [facility] site does indicate that she follows with psychiatry Dr Johnathan Roberts for Vyvanse, and I will discuss this with her at that time.1339 LMTCB - want to check and see if she got her medication yetPt called at 1350. She got her med and will have appt with [facility] in 1 month.Noted.

## 2022-02-16 NOTE — PROGRESS NOTES
Patient:   LULU LARA            MRN: [id number]            FIN: [id number]               Age:   52 years     Sex:  Female     :  1965   Associated Diagnoses:   Burning with urination   Author:   Dewayne DOSHI, Loraine      Chief Complaint   2017 6:21 PM CDT    Possible Kidney infection, started ot with UTI last week, now  right side flank pain.  Has HX of kidney infections, was being treated with Macrobid from [facility] and stopped taking due to making her ill.  (Modified)       History of Present Illness   Concerning symptoms as listed in Chief Complaint above discussed and confirmed with patient. Took 3 days of macrobid and stopped it 3 days ago. Gave her diarrhea which self resolved  Fever up to 99.3  Using no OTC meds, refrains from ibuprofen due to celebrex use.    feels that the right flank pain is consistent with kidney problems  has had pain with urination and urgency but resolved with macrobid, now only feels pressure         Review of Systems   Constitutional:  No fever, No chills.    Gastrointestinal:  No nausea, No vomiting.       Health Status   Allergies:    Allergic Reactions (Selected)  No known allergies   Medications:  (Selected)   Prescriptions  Prescribed  Lexapro 20 mg oral tablet: 1 tab(s) ( 20 mg ), PO, Daily, # 90 tab(s), 3 Refill(s), Type: Maintenance, Pharmacy: [id number], 1 tab(s) po daily  Norco 5 mg-325 mg oral tablet: 1 tab(s), po, q6 hrs, Instructions: for neck and bilateral knee pain, PRN: as needed for pain, # 120 tab(s), 0 Refill(s), Type: Maintenance, Pharmacy: [id number], 1 tab(s) po q6 hrs,PRN:as needed for pain,Instr:for neck and bilateral k...  celecoxib 100 mg oral capsule: 2 cap(s) ( 200 mg ), po, bid, # 120 cap(s), 0 Refill(s), Type: Maintenance, Pharmacy: [facility] [id number], Due for visit  Documented Medications  Documented  Daily Multi: 1 tab(s), po, daily, 0 Refill(s), Type: Maintenance  Vitamin D3 5000 intl  units oral capsule: 1 cap(s) ( 5,000 International Unit ), po, daily, 0 Refill(s), Type: Maintenance  Xanax 0.25 mg oral tablet: 1 tab(s) ( 0.25 mg ), PO, TID, PRN: for anxiety, 0 Refill(s), Type: Maintenance  Zomig 5 mg oral tablet: 1 tab(s) ( 5 mg ), PO, Once, PRN: for migraine headache, # 10 tab(s), 0 Refill(s), Type: Maintenance  hydrochlorothiazide 25 mg oral tablet: 1 tab(s) ( 25 mg ), po, daily, 0 Refill(s), Type: Maintenance   Problem list:    All Problems  Chronic back pain / SNOMED CT 551274983 / Confirmed  Obesity / SNOMED CT 4932521895 / Probable  Depression / SNOMED CT 9569790618 / Confirmed  Chronic neck pain / SNOMED CT 9620971744 / Confirmed  Anxiety disorder / SNOMED CT 441634954 / Confirmed  Migraines / SNOMED CT 90916843 / Confirmed  Resolved: History of chicken pox / SNOMED CT 160557289  Resolved: Pregnancy / SNOMED CT 841051274  Resolved: Pregnancy / SNOMED CT 692008393  Resolved: ASCUS / SNOMED CT 11240009  reflex negative HPV DNA      Histories   Past Medical History:    Active  Anxiety disorder (793595543)  Depression (3587802962)  Migraines (62660317)  Chronic neck pain (5358481065)  Chronic back pain (936130335)  Resolved  ASCUS (59958867): Onset on 3/28/2008 at 42 years.  Resolved.  Comments:  2/13/2017 CST 6:18 PM CST - Ella Zamudio CMA  reflex negative HPV DNA  History of chicken pox (896254150):  Resolved.  Pregnancy (027345836):  Resolved in 1991 at 25 years.  Pregnancy (903866992):  Resolved in 1994 at 28 years.   Family History:    Migraine  Mother (Lauren)  Breast Cancer  Mother (Lauren)     Procedure history:    Ultrasound (SNOMED CT 650020077) on 11/11/2011 at 46 Years.  Comments:  2/13/2017 5:53 PM - Ella Zamudio CMA  abdominal u/s   conclusion: Several calcified splenic granulomas, benign etiology. No additional acute abdominal ultrasound abnormality.  Plain X-ray lumbar spine normal (SNOMED CT 309368795) on 7/18/2011 at 46 Years.  Comments:  2/13/2017 5:57 PM Darline Zamudio  Ella BOJORQUEZ  within normal limits for age  CT of abdomen and pelvis (SNOMED CT 4885921823) on 2/19/2011 at 45 Years.  Comments:  2/13/2017 5:59 PM - Ella Zamudio CMA  conclusion:   1. No definite renal calculi, renal mass or obstructive process.  2. No distinct abdominal or pelvic mass.  X-ray of abdomen (SNOMED CT 345611067) on 7/28/2008 at 43 Years.  Comments:  2/13/2017 6:03 PM - Ella Zamudio CMA  Impression: The most inferior medial right pelvic calcification could represent te right ureterovesical junction stone but there was a second calcification at this level on the CT compatible with phlebolith. It is indeterminate whether this calcification represents the phlebolith or the ureteral stone at the same approximate level.  Ultrasound (SNOMED CT 206132612) on 7/17/2008 at 43 Years.  Comments:  2/13/2017 6:11 PM - Ella Zamudio CMA  u/s abdomen  Impression:   1. Contracted gallbladder without evidence of cholelithiasis. No convincing evidence of cholecystitis.  2. Mild right-sided hydronephrosis. This is consistent with a distal right ureteral calculus demonstrated on subsequent CT scan.  CT of abdomen and pelvis (SNOMED CT 5238057289) on 7/17/2008 at 43 Years.  Comments:  2/13/2017 6:15 PM - Ella Zamudio CMA  Impression:   1. 3 mm calculus at the distal right ureterovesicular junction with mild proximal hydronephrosis.  2. No other renal, ureteral or bladder calculi are demonstrated .  MRI of cervical spine (SNOMED CT 413957651) on 10/26/2007 at 42 Years.  Comments:  2/13/2017 6:31 PM - Ella Zamudio CMA  Conclusion:  1. Moderate C5-6 degenerative disc disease with a 2.5 mm right posterolateral and foraminal disc herniation. This contacts the ventral cord with moderate foraminal encroachment and right C6 nerve root impingement.  2. A midline annular fissure and bulge at C4-5 contacts the ventral surface of the cord.  lipoma removal on back.  avm removal L knee.   Social History:        Alcohol  Assessment: Denies Alcohol Use            Never      Tobacco Assessment: Past      Substance Abuse Assessment: Denies Substance Abuse            Never      Employment and Education Assessment            Employed, Work/School description: Clinical Director.      Home and Environment Assessment            Spouse/Partner name: Alan.      Nutrition and Health Assessment            Type of diet: Regular.      Exercise and Physical Activity Assessment: Regular exercise            Exercise frequency: Daily.  Exercise type: Walking, yoga/pilates every day.      Sexual Assessment            Sexually active: Yes.  Sexual orientation: Heterosexual.  Contraceptive Use Details: Vaginal ring.        Physical Examination   Vital Signs   6/21/2017 6:21 PM CDT Temperature Tympanic 98.1 DegF    Peripheral Pulse Rate 76 bpm    Pulse Site Radial artery    HR Method Manual    Systolic Blood Pressure 94 mmHg    Diastolic Blood Pressure 66 mmHg    Mean Arterial Pressure 75 mmHg    BP Site Right arm    BP Method Manual      Measurements from flowsheet : Measurements   6/21/2017 6:21 PM CDT Height Measured - Standard 66 in    Weight Measured - Standard 203.4 lb    BSA 2.07 m2    Body Mass Index 32.83 kg/m2      General:  Alert and oriented, No acute distress.    Neck:  Supple, Non-tender, No lymphadenopathy.    Respiratory:  Lungs are clear to auscultation, Respirations are non-labored, Breath sounds are equal, Symmetrical chest wall expansion, No chest wall tenderness.    Cardiovascular:  Normal rate, Regular rhythm, No murmur, No gallop.    Genitourinary:  No costovertebral angle tenderness.    Musculoskeletal:  able to flex forward and does feel pull in right flank area when does this.    Integumentary:  Warm, Dry, Pink, No rash.    Neurologic:  Alert.       Review / Management   Results review:  Lab results   6/21/2017 6:17 PM CDT UA Color Yellow    UA Clarity Clear    UA pH 7.0    UA Specific Gravity 1.020    UA Glucose Negative mg/dL     UA Bilirubin Negative    UA Ketones Negative mg/dL    Urine Occult Blood Negative    UA Protein Negative mg/dL    UA Nitrite Negative    UA Leukocyte Esterase Negative    UA Urobilinogen Normal   .       Impression and Plan   Diagnosis     Burning with urination (TJV32-QG R30.0).     Patient Instructions:       Counseled: Patient, Regarding diagnosis, Regarding treatment, Regarding medications, Verbalized understanding.    Orders     Orders (Selected)   Outpatient Orders  Ordered (In Transit)  Culture, Urine, Routine* ([facility]): Specimen Type: Urine (Clean Catch), Collection Date: 06/21/17 18:11:00 CDT.     flank pain may very well be muscular  Advised Tylenol for pain  await UC  avoid caffeine/soda/ETOH and increase water.

## 2022-02-16 NOTE — NURSING NOTE
Comprehensive Intake Entered On:  11/30/2021 12:57 PM CST    Performed On:  11/30/2021 12:53 PM CST by Angela Hood LPN               Summary   Chief Complaint :   f/u on start of Butran patches and started back up on HCTZ for BP 10/2021 - verbal consent for telephone visit   Menstrual Status :   Postmenopausal   Height Measured :   65.75 in(Converted to: 5 ft 6 in, 167.00 cm)    Angela Hood LPN - 11/30/2021 12:53 PM CST   Health Status   Allergies Verified? :   Yes   Medication History Verified? :   Yes   Medical History Verified? :   No   Pre-Visit Planning Status :   Not completed   Tobacco Use? :   Former smoker   Angela oHod LPN - 11/30/2021 12:53 PM CST   Meds / Allergies   (As Of: 11/30/2021 12:57:04 PM CST)   Allergies (Active)   No Known Medication Allergies  Estimated Onset Date:   Unspecified ; Created By:   Angela Hood LPN; Reaction Status:   Active ; Category:   Drug ; Substance:   No Known Medication Allergies ; Type:   Allergy ; Updated By:   Angela Hood LPN; Reviewed Date:   9/10/2021 1:23 PM CDT        Medication List   (As Of: 11/30/2021 12:57:04 PM CST)   Prescription/Discharge Order    buprenorphine  :   buprenorphine ; Status:   Prescribed ; Ordered As Mnemonic:   Butrans 5 mcg/hr transdermal film, extended release ; Simple Display Line:   1 patch(es), TD, q7 day, apply to clean, dry, intact skin, 4 patch(es), 1 Refill(s) ; Ordering Provider:   Loraine Kelly; Catalog Code:   buprenorphine ; Order Dt/Tm:   11/1/2021 5:21:31 PM CDT          ZOLMitriptan  :   ZOLMitriptan ; Status:   Prescribed ; Ordered As Mnemonic:   Zomig 5 mg oral tablet ; Simple Display Line:   5 mg, 1 tab(s), PO, Once, PRN: for migraine headache, 12 tab(s), 2 Refill(s) ; Ordering Provider:   Loraine Kelly; Catalog Code:   ZOLMitriptan ; Order Dt/Tm:   10/28/2021 8:26:56 AM CDT          ferrous sulfate  :   ferrous sulfate ; Status:   Prescribed ; Ordered As Mnemonic:   Slow Fe (as  elemental iron) 45 mg oral tablet, extended release ; Simple Display Line:   45 mg, 1 tab(s), Oral, daily, to replace current iron supplement, 90 tab(s), 1 Refill(s) ; Ordering Provider:   Loraine Kelly; Catalog Code:   ferrous sulfate ; Order Dt/Tm:   10/28/2021 8:25:33 AM CDT          escitalopram  :   escitalopram ; Status:   Prescribed ; Ordered As Mnemonic:   Lexapro 20 mg oral tablet ; Simple Display Line:   20 mg, 1 tab(s), PO, Daily, 90 tab(s), 1 Refill(s) ; Ordering Provider:   Loraine Kelly; Catalog Code:   escitalopram ; Order Dt/Tm:   10/28/2021 8:27:22 AM CDT          omeprazole  :   omeprazole ; Status:   Prescribed ; Ordered As Mnemonic:   omeprazole 20 mg oral delayed release capsule ; Simple Display Line:   20 mg, 1 cap(s), Oral, daily, 90 EA, 1 Refill(s) ; Ordering Provider:   Loraine Kelly; Catalog Code:   omeprazole ; Order Dt/Tm:   10/28/2021 8:27:51 AM CDT          hydroCHLOROthiazide  :   hydroCHLOROthiazide ; Status:   Prescribed ; Ordered As Mnemonic:   hydroCHLOROthiazide 25 mg oral tablet ; Simple Display Line:   12.5 mg, 0.5 tab(s), PO, Daily, for 90 day(s), 45 tab(s), 1 Refill(s) ; Ordering Provider:   Loraine Kelly; Catalog Code:   hydroCHLOROthiazide ; Order Dt/Tm:   10/28/2021 8:33:28 AM CDT            Home Meds    multivitamin with minerals  :   multivitamin with minerals ; Status:   Documented ; Ordered As Mnemonic:   Daily Multi ; Simple Display Line:   1 tab(s), po, daily, 0 Refill(s) ; Catalog Code:   multivitamin with minerals ; Order Dt/Tm:   2/6/2017 6:05:33 PM CST          ferrous fumarate  :   ferrous fumarate ; Status:   Documented ; Ordered As Mnemonic:   ferrous fumarate 325 mg (106 mg elemental iron) oral tablet ; Simple Display Line:   325 mg, 1 tab(s), Oral, daily, 0 Refill(s) ; Catalog Code:   ferrous fumarate ; Order Dt/Tm:   9/10/2021 2:05:03 PM CDT

## 2022-02-16 NOTE — PROGRESS NOTES
Patient:   LULU LARA            MRN: [id number]            FIN: [id number]               Age:   53 years     Sex:  Female     :  1965   Associated Diagnoses:   Hypertension; Knee pain; Migraines   Author:   Misty Davila      Chief Complaint   10/12/2018 10:03 AM CDT  f/u Bp check. Left knee pain.        History of Present Illness     PPC for her HTN check, since starting HCTZ BP better controlled, no chest pain, no orthopnea, no edema  she would like to remain on this, due for BMP  over due for mammogram  would like refill of butalbital which she uses prn for migraines, avergaing once or twice /month  she continues to have left knee pain and reports having seen ortho in past for this and told it was OA and she should loose weight, she is trying to do this and has been walking 2-4 miles/d but has not had success and because of this continues to have left knee pain.  She does not want to pursue knee replacement.  She has not had steroid injections into knee and does report imaging done through ortho at time of eval (), patela femoral maltracking, OA, has trialed appetite supressants in past but developed HA      Review of Systems   Constitutional:  Negative.    Eye:  Negative.    Ear/Nose/Mouth/Throat:  Negative.    Respiratory:  Negative.    Cardiovascular:  Negative except as documented in history of present illness.    Endocrine:  Negative.    Musculoskeletal:  Negative except as documented in history of present illness.    Integumentary:  Negative.    Neurologic:  Negative.    Psychiatric:  Negative.              Health Status   Allergies:    Allergic Reactions (Selected)  No known allergies   Medications:  (Selected)   Prescriptions  Prescribed  Contrave 8 mg-90 mg oral tablet, extended release: See Instructions, Instructions: 1 tab qd x1week then  1 tab bid x1 week then  2 tabs q am and 1 tab pm x1 week then  2 tabs bid after this, # 120 tab(s), 2 Refill(s), Type: Maintenance,  Pharmacy: [facility], 1 tab qd x1week th...  Lexapro 20 mg oral tablet: 1 tab(s) ( 20 mg ), PO, Daily, # 90 tab(s), 3 Refill(s), Type: Maintenance, Pharmacy: [facility], 1 tab(s) po daily  Norco 5 mg-325 mg oral tablet: 1 tab(s), po, q4-6 hrs, # 30 tab(s), 0 Refill(s), Type: Maintenance, Pharmacy: [facility], 1 tab(s) po q4-6 hrs  Norco 5 mg-325 mg oral tablet: 1 tab(s), po, q6 hrs, Instructions: for neck and bilateral knee pain, PRN: as needed for pain, # 120 tab(s), 0 Refill(s), Type: Maintenance, Pharmacy: [facility] [id number], 1 tab(s) po q6 hrs,PRN:as needed for pain,Instr:for neck and bilateral k...  Qsymia 3.75 mg-23 mg oral capsule, extended release: 1 cap(s), po, qam, Instructions: use one tab daily for 14d then move to higher dose (see next script), # 14 cap(s), 0 Refill(s), Type: Maintenance, Pharmacy: [facility] [id number], 1 cap(s) po qam,x14 day(s),Instr:use one tab daily for 14d then m...  Qsymia 7.5 mg-46 mg oral capsule, extended release: 1 cap(s), po, qam, # 90 cap(s), 0 Refill(s), Type: Maintenance, Pharmacy: [facility] [id number], 1 cap(s) po qam  acetaminophen-butalbital 325 mg-50 mg oral tablet: 2 tab(s), PO, q4hr, PRN: for headache, # 60 tab(s), 0 Refill(s), Type: Maintenance, Pharmacy: [facility] [address], 2 tab(s) Oral q4 hrs,PRN:for headache  busPIRone 10 mg oral tablet: See Instructions, Instructions: TAKE 1 TABLET BY MOUTH THREE TIMES DAILY, # 90 tab(s), 5 Refill(s), Type: Soft Stop, Pharmacy: [facility], TAKE 1 TABLET BY MOUTH THREE TIMES DAILY  celecoxib 100 mg oral capsule: 2 cap(s) ( 200 mg ), po, bid, # 120 cap(s), 1 Refill(s), Type: Maintenance, Pharmacy: [facility], 2 cap(s) po bid,x30 day(s)  celecoxib 200 mg oral capsule: 1 cap(s) ( 200 mg ), PO, Daily, # 90 cap(s), 3 Refill(s), Type: Maintenance, Pharmacy: [facility],  1 cap(s) po daily  hydroCHLOROthiazide 12.5 mg oral capsule: = 1 cap(s) ( 12.5 mg ), PO, Daily, # 90 cap(s), 3 Refill(s), Type: Maintenance, Pharmacy: [facility] [address], patient needs appt for further refills, 1 cap(s) Oral daily  Documented Medications  Documented  Daily Multi: 1 tab(s), po, daily, 0 Refill(s), Type: Maintenance  Vitamin D3 5000 intl units oral capsule: 1 cap(s) ( 5,000 International Unit ), po, daily, 0 Refill(s), Type: Maintenance  Xanax 0.25 mg oral tablet: 1 tab(s) ( 0.25 mg ), PO, TID, PRN: for anxiety, 0 Refill(s), Type: Maintenance  Zomig 5 mg oral tablet: 1 tab(s) ( 5 mg ), PO, Once, PRN: for migraine headache, # 10 tab(s), 0 Refill(s), Type: Maintenance   Problem list:    All Problems (Selected)  Anxiety disorder / SNOMED CT 694496683 / Confirmed  Chronic back pain / SNOMED CT 700642851 / Confirmed  Chronic neck pain / SNOMED CT 0253535053 / Confirmed  Depression / SNOMED CT 4983519311 / Confirmed  Migraines / SNOMED CT 13225769 / Confirmed  Obesity / SNOMED CT 5160338535 / Probable      Histories   Family History:    Hypertension  Mother (Lauren)  Migraine  Mother (Lauren)  Depression  Mother (Lauren)  Breast Cancer  Mother (Lauren)  High cholesterol  Mother (Lauren)        Physical Examination   Vital Signs   10/12/2018 10:03 AM CDT Temperature Tympanic 99.1 DegF    Peripheral Pulse Rate 66 bpm    Systolic Blood Pressure 122 mmHg    Diastolic Blood Pressure 80 mmHg    Mean Arterial Pressure 94 mmHg      Measurements from flowsheet : Measurements   10/12/2018 10:03 AM CDT Height Measured - Standard 66.5 in    Weight Measured - Standard 206 lb    BSA 2.09 m2    Body Mass Index 32.75 kg/m2  HI      General:  Alert and oriented, No acute distress.    Eye:  Pupils are equal, round and reactive to light, Extraocular movements are intact.    HENT:  Normocephalic, Tympanic membranes are clear, No pharyngeal erythema.    Neck:  Supple, Non-tender, No carotid bruit, No jugular venous  distention, No lymphadenopathy, No thyromegaly.    Respiratory:  Lungs are clear to auscultation, Respirations are non-labored.    Cardiovascular:  Normal rate, Regular rhythm, No edema.    Musculoskeletal:  Normal range of motion.    Integumentary:  Warm, Dry, Pink.    Neurologic:  Alert, Oriented, Normal sensory.    Psychiatric:  Cooperative, Appropriate mood & affect, Normal judgment.       Impression and Plan   Diagnosis     Hypertension (VEF24-TM I10).     Knee pain (DQK80-XN M25.569).     Migraines (PMS48-LK G43.909).     Patient Instructions:       Counseled: Patient, Regarding diagnosis, Regarding treatment, Regarding medications, Activity, Verbalized understanding.    Summary:  BMP pending, HCTZ refilled    butalbital refilled    discussed knee pain, we can have her return to ortho and discuss if steroid injection would offer benefit, we could pursue MRI if there has been a change or steady increase in pain as this will show more soft tissue, we can send to PT but ultimately losing weight and continued activity for ROM is her best option, she is using occaional celebrex and does not need a refill at this time    will f/u in 6 months    mammogram.    Orders     Orders (Selected)   Prescriptions  Prescribed  acetaminophen-butalbital 325 mg-50 mg oral tablet: 2 tab(s), PO, q4hr, PRN: for headache, # 60 tab(s), 0 Refill(s), Type: Maintenance, Pharmacy: [facility] [address], 2 tab(s) Oral q4 hrs,PRN:for headache  hydroCHLOROthiazide 12.5 mg oral capsule: = 1 cap(s) ( 12.5 mg ), PO, Daily, # 90 cap(s), 3 Refill(s), Type: Maintenance, Pharmacy: [facility] [address], patient needs appt for further refills, 1 cap(s) Oral daily.

## 2022-02-16 NOTE — TELEPHONE ENCOUNTER
called and left detailed message about medial joint space loss on knee image.  could obtain steroid injection.  I am happy to attempt this or we can send to orthopaedist.  encouraged her to call back and let me know what she would like to do

## 2022-02-16 NOTE — PROGRESS NOTES
Patient:   LULU LARA            MRN: [id number]            FIN: [id number]               Age:   53 years     Sex:  Female     :  1965   Associated Diagnoses:   Anxiety; Knee pain; Obesity   Author:   Misty Davila      Visit Information      Primary Care Provider (PCP):  MALATHI -UNKNOWN,    NPI# [id number]      Chief Complaint   2019 5:29 PM CST     Here for fu with Lomaira for weight loss and also follow up bilateral knee pain, worse in the left knee      History of Present Illness   PPC for f/u on lomaira use which she started 2018,  pt has gained ten pounds since starting weight loss medication  she admits this is from binge eating and admits to occasionally hiding food out of embarrassment or shame, discussed potential for eating disorder  she is asking for ritalin to jump start her weight loss, she tells me if she could loose 10 pounds she feels she would have motivation to work out.  Currently she had bilateral knee pain which she feels is from her obesity and although she is walking a mile every morning and evening she cannot exercise more  in past she has tried  wellbutrin but used this for anxiety and found it did not control her anxiety well so switched to lexapro  contrave: headache  qysmia: headaches  lomaira: not helping, up 10 pounds  hgba1c 5.5 2017      Review of Systems   Constitutional:  Negative except as documented in history of present illness.    Ear/Nose/Mouth/Throat:  Negative.    Respiratory:  Negative.    Cardiovascular:  Negative.    Gastrointestinal:  Negative.    Hematology/Lymphatics:  Negative.    Immunologic:  Negative.    Musculoskeletal:  Negative.    Integumentary:  Negative.    Neurologic:  Negative.    Psychiatric:  Anxiety, No depression, No lashanda, Not suicidal, Not delusional, No hallucinations.       Health Status   Allergies:    Allergic Reactions (Selected)  No known allergies   Medications:  (Selected)   Prescriptions  Prescribed  Lexapro 20 mg  oral tablet: 1 tab(s) ( 20 mg ), PO, Daily, # 90 tab(s), 3 Refill(s), Type: Maintenance, Pharmacy: [facility], 1 tab(s) po daily  Lomaira 8 mg oral tablet: See Instructions, Instructions: take 30 minutes before meals (TID), # 90 tab(s), 0 Refill(s), Type: Maintenance, Pharmacy: [facility] [id number], take 30 minutes before meals (TID)  acetaminophen-butalbital 325 mg-50 mg oral tablet: 2 tab(s), PO, q4hr, PRN: for headache, # 60 tab(s), 0 Refill(s), Type: Maintenance, Pharmacy: [facility] [id number], 2 tab(s) Oral q4 hrs,PRN:for headache  celecoxib 100 mg oral capsule: 2 cap(s) ( 200 mg ), po, bid, # 120 cap(s), 1 Refill(s), Type: Maintenance, [facility] [address], 2 cap(s) po bid,x30 day(s)  celecoxib 200 mg oral capsule: 1 cap(s) ( 200 mg ), PO, Daily, # 90 cap(s), 3 Refill(s), Type: Maintenance, Pharmacy: [facility], 1 cap(s) po daily  hydroCHLOROthiazide 12.5 mg oral capsule: = 1 cap(s) ( 12.5 mg ), PO, Daily, # 90 cap(s), 3 Refill(s), Type: Maintenance, [facility] [address], patient needs appt for further refills, 1 cap(s) Oral daily  Documented Medications  Documented  Daily Multi: 1 tab(s), po, daily, 0 Refill(s), Type: Maintenance  Vitamin D3 5000 intl units oral capsule: 1 cap(s) ( 5,000 International Unit ), po, daily, 0 Refill(s), Type: Maintenance  Xanax 0.25 mg oral tablet: 1 tab(s) ( 0.25 mg ), PO, TID, PRN: for anxiety, 0 Refill(s), Type: Maintenance  Zomig 5 mg oral tablet: 1 tab(s) ( 5 mg ), PO, Once, PRN: for migraine headache, # 10 tab(s), 0 Refill(s), Type: Maintenance   Problem list:    All Problems (Selected)  Anxiety disorder / SNOMED CT 619960077 / Confirmed  Chronic back pain / SNOMED CT 300085867 / Confirmed  Chronic neck pain / SNOMED CT 0712982256 / Confirmed  Depression / SNOMED CT 2988490373 / Confirmed  Migraines / SNOMED CT 58274416 / Confirmed  Obesity / SNOMED CT 4597756523 / Probable       Histories   Past Medical History:    Active  Anxiety disorder (978188198)  Depression (9096248732)  Migraines (70388566)  Chronic neck pain (6432854338)  Chronic back pain (366644593)  Resolved  ASCUS (28826928): Onset on 3/28/2008 at 42 years.  Resolved.  Comments:  2/13/2017 CST 6:18 PM CST - [place] CMA, Ella  reflex negative HPV DNA  History of chicken pox (408283004):  Resolved.  Pregnancy (761197938):  Resolved in 1991 at 25 years.  Pregnancy (455231065):  Resolved in 1994 at 28 years.   Family History:    Hypertension  Mother (Lauren)  Migraine  Mother (Lauren)  Depression  Mother (Lauren)  Breast Cancer  Mother (Lauren)  High cholesterol  Mother (Lauren)     Social History:        Alcohol Assessment            Never      Tobacco Assessment            Former smoker, quit more than 30 days ago      Substance Abuse Assessment            Never      Employment and Education Assessment            Employed, Work/School description: -Dialysis.      Home and Environment Assessment            Marital status: .  Spouse/Partner name: Alan.      Nutrition and Health Assessment            Type of diet: Regular.      Exercise and Physical Activity Assessment            Exercise frequency: Daily.  Exercise type: Walking, Weight lifting, cardio.      Sexual Assessment            Sexually active: Yes.  Sexual orientation: Heterosexual.  Contraceptive Use Details: Vaginal ring.      Physical Examination   Vital Signs   2/4/2019 5:29 PM CST Peripheral Pulse Rate 76 bpm    Pulse Site Radial artery    Systolic Blood Pressure 130 mmHg    Diastolic Blood Pressure 80 mmHg    Mean Arterial Pressure 97 mmHg    BP Site Right arm    Oxygen Saturation 98 %      General:  Alert and oriented, crying in room.    Eye:  Pupils are equal, round and reactive to light.    HENT:  Normocephalic.    Neck:  Supple.    Musculoskeletal:  Normal range of motion.    Integumentary:  Warm, Dry, Pink.    Neurologic:  Alert,  Oriented, Normal sensory, No focal deficits.    Psychiatric:  Cooperative, Appropriate mood & affect, Normal judgment, Non-suicidal.       Review / Management   Radiology results   X-ray, reviewed imaging of left knee      Impression and Plan   Diagnosis     Anxiety (OQI28-LH F41.9).     Knee pain (HWT61-YL M25.569).     Obesity (KMF54-ZR E66.9).     Patient Instructions:       Counseled: Patient, Regarding diagnosis, Regarding treatment, Regarding medications, Activity, Verbalized understanding.    Summary:  will not refill lomaira as it has not been beneficial  will try metformin 500mg po qd, discussed side effects,if diarrhea should occur I want to hear from pt  will also add in wellbturin to help with eating in hopes the the medicaiton will act as appetite suppressant and allow for better anxiety control  encouraged counseling, discussed [facility] program but she would like to start with counseling locally first  f/u in 6-8 weeks   reviewed knee image, final read is pending, I do not think she needs to see orthopaedist at this time.    Orders     Orders (Selected)   Prescriptions  Prescribed  Lexapro 20 mg oral tablet: = 1 tab(s) ( 20 mg ), PO, Daily, # 90 tab(s), 3 Refill(s), Type: Maintenance, Pharmacy: [facility] [id number], 1 tab(s) Oral daily  Wellbutrin  mg/24 hours oral tablet, extended release: = 1 tab(s) ( 150 mg ), Oral, q 24 hrs, # 90 tab(s), 1 Refill(s), Type: Maintenance, Pharmacy: [facility] [id number], 1 tab(s) Oral q 24 hrs  metFORMIN 500 mg oral tablet: = 1 tab(s) ( 500 mg ), Oral, q 24 hrs, # 90 tab(s), 1 Refill(s), Type: Maintenance, Pharmacy: [facility] [id number], 1 tab(s) Oral q 24 hrs.

## 2022-02-16 NOTE — PROCEDURES
Accession Number:       [id number]  CLINICAL INFORMATION::     None given  LMP::     PM  PREV. PAP::     03/10/2018 WNL  PREV. BX::     NONE GIVEN  SOURCE::     None given  STATEMENT OF ADEQUACY::     Satisfactory for evaluation. Endocervical/transformation zone component present.  INTERPRETATION/RESULT::     Negative for intraepithelial lesion or malignancy.  COMMENT::     This Pap test has been evaluated with computer assisted technology.  CYTOTECHNOLOGIST::     SHYANNE ALMEIDA(ASCP) CT Screening location: [place] [address]  COMMENT:     See comment       EXPLANATORY NOTE:         The Pap is a screening test for cervical cancer. It is       not a diagnostic test and is subject to false negative       and false positive results. It is most reliable when a       satisfactory sample, regularly obtained, is submitted       with relevant clinical findings and history, and when       the Pap result is evaluated along with historic and       current clinical information.  HPV mRNA E6/E7:     Not Detected       Methodology: Transcription-Mediated Amplification       This assay detects E6/E7 viral messenger RNA (mRNA) from 14       high-risk HPV types (16,18,31,33,35,39,45,51,52,56,58,59,66,68).           The analytical performance characteristics of this       assay have been determined by [employer].       The modifications have not been cleared or approved       by the FDA. This assay has been validated pursuant       to the CLIA regulations and is used for       clinical purposes.         For additional information, please refer to       http://education.M[employer].Typesafe/faq/DAQ956j9       (This link if provided for information/       educational purposes only.)

## 2022-02-16 NOTE — TELEPHONE ENCOUNTER
Entered by Kamille Mcallister CMA on May 22, 2019 9:57:31 AM CDT  ---------------------  From: Kamille Mcallister CMA   To: [employer] [id number]    Sent: 5/22/2019 9:57:31 AM CDT  Subject: Medication Management     ** Submitted: **  Order:celecoxib (celecoxib 200 mg oral capsule)  1 cap(s)  Oral  daily  Qty:  14 cap(s)        Duration:  14 day(s)        Refills:  0          Substitutions Allowed     [facility] [place] [address]    Signed by Kamille Mcallister CMA  5/22/2019 9:57:00 AM    ** Submitted: **  Complete:celecoxib (celecoxib 200 mg oral capsule)   Signed by Kamille Mcallister CMA  5/22/2019 9:57:00 AM    ** Not Approved:  **  celecoxib (CELECOXIB 200MG CAPSULES)  TAKE 1 CAPSULE BY MOUTH DAILY  Qty:  30 cap(s)        Days Supply:  30        Refills:  0          Substitutions Allowed     [facility] [place] [address]   Signed by Kamille Mcallister CMA            ------------------------------------------  From: [employer] [id number]  To: Hayden Teixeira MD  Sent: May 20, 2019 11:47:26 AM CDT  Subject: Medication Management  Due: May 21, 2019 11:47:26 AM CDT    ** On Hold Pending Signature **  Drug: celecoxib (celecoxib 200 mg oral capsule)  1 CAP(S) ORAL DAILY  Quantity: 30 cap(s)     Days Supply: 0         Refills: 0  Substitutions Allowed  Notes from Pharmacy: Pt needs to be seen for further refills and to establish care w/ HCP.    Dispensed Drug: celecoxib (celecoxib 200 mg oral capsule)  TAKE 1 CAPSULE BY MOUTH DAILY  Quantity: 30 cap(s)     Days Supply: 30        Refills: 0  Substitutions Allowed  Notes from Pharmacy:   ------------------------------------------msg pt via portal about establishing w/ a new provider.

## 2022-02-16 NOTE — NURSING NOTE
Comprehensive Intake Entered On:  9/10/2021 1:28 PM CDT    Performed On:  9/10/2021 1:21 PM CDT by Angela Hood LPN               Summary   Chief Complaint :   1) pre op exam: balloon endoscopy scheduled 9/14/21 at [facility] with Dr Persaud 2) f/u hospital stay for GI bleed, would like her Hgb check as it was low during hospital stay   Menstrual Status :   Postmenopausal   Weight Measured :   194.2 lb(Converted to: 194 lb 3 oz, 88.088 kg)    Height Measured :   65.75 in(Converted to: 5 ft 6 in, 167.00 cm)    Body Mass Index :   31.58 kg/m2 (HI)    Body Surface Area :   2.02 m2   Systolic Blood Pressure :   128 mmHg   Diastolic Blood Pressure :   84 mmHg (HI)    Mean Arterial Pressure :   99 mmHg   Peripheral Pulse Rate :   81 bpm   BP Site :   Right arm   BP Method :   Manual   Temperature Tympanic :   98.5 DegF(Converted to: 36.9 DegC)    Oxygen Saturation :   98 %   Angela Hood LPN - 9/10/2021 1:21 PM CDT   Health Status   Allergies Verified? :   Yes   Medication History Verified? :   Yes   Medical History Verified? :   No   Pre-Visit Planning Status :   Completed   Tobacco Use? :   Former smoker   Angela Hood LPN - 9/10/2021 1:21 PM CDT   Meds / Allergies   (As Of: 9/10/2021 1:28:42 PM CDT)   Allergies (Active)   No Known Medication Allergies  Estimated Onset Date:   Unspecified ; Created By:   Angela Hood LPN; Reaction Status:   Active ; Category:   Drug ; Substance:   No Known Medication Allergies ; Type:   Allergy ; Updated By:   Angela Hood LPN; Reviewed Date:   9/10/2021 1:23 PM CDT        Medication List   (As Of: 9/10/2021 1:28:42 PM CDT)   Prescription/Discharge Order    escitalopram  :   escitalopram ; Status:   Prescribed ; Ordered As Mnemonic:   Lexapro 20 mg oral tablet ; Simple Display Line:   20 mg, 1 tab(s), PO, Daily, 90 tab(s), 3 Refill(s) ; Ordering Provider:   Misty Davila; Catalog Code:   escitalopram ; Order Dt/Tm:   2/4/2019 5:46:58 PM CST             Home Meds    multivitamin with minerals  :   multivitamin with minerals ; Status:   Documented ; Ordered As Mnemonic:   Daily Multi ; Simple Display Line:   1 tab(s), po, daily, 0 Refill(s) ; Catalog Code:   multivitamin with minerals ; Order Dt/Tm:   2/6/2017 6:05:33 PM CST          ZOLMitriptan  :   ZOLMitriptan ; Status:   Documented ; Ordered As Mnemonic:   Zomig 5 mg oral tablet ; Simple Display Line:   5 mg, 1 tab(s), PO, Once, PRN: for migraine headache, 10 tab(s), 0 Refill(s) ; Catalog Code:   ZOLMitriptan ; Order Dt/Tm:   2/6/2017 6:03:41 PM CST            Social History   Social History   (As Of: 9/10/2021 1:28:42 PM CDT)   Alcohol:        Never   (Last Updated: 1/21/2013 4:26:44 PM CST by Morelia Acuña CMA)          Tobacco:        Former smoker, quit more than 30 days ago   (Last Updated: 9/10/2021 1:25:58 PM CDT by Angela Hood LPN)          Electronic Cigarette/Vaping:        Electronic Cigarette Use: Never.   (Last Updated: 9/10/2021 1:26:02 PM CDT by Angela Hood LPN)          Substance Abuse:        Never   (Last Updated: 1/21/2013 4:26:51 PM CST by Morelia Acuña CMA)          Employment/School:        Employed, Work/School description: -Dialysis.   (Last Updated: 3/13/2018 8:44:03 AM CDT by Christina Mcugire)          Home/Environment:        Marital status: .  Spouse/Partner name: Alan.   (Last Updated: 3/13/2018 8:44:09 AM CDT by Christina Mcguire)          Nutrition/Health:        Type of diet: Regular.   (Last Updated: 1/21/2013 4:27:31 PM CST by Morelia Acuña CMA)          Exercise:        Exercise frequency: Daily.  Exercise type: Walking, Weight lifting, cardio.   (Last Updated: 3/13/2018 8:44:35 AM CDT by Christina Mcguire)          Sexual:        Sexually active: Yes.  Sexual orientation: Heterosexual.  Contraceptive Use Details: Vaginal ring.   (Last Updated: 1/21/2013 4:39:18 PM CST by Morelia Acuña CMA)

## 2022-02-16 NOTE — PROGRESS NOTES
Patient:   LULU LARA            MRN: [id number]            FIN: [id number]               Age:   52 years     Sex:  Female     :  1965   Associated Diagnoses:   Cough; Elevated d-dimer; Fever   Author:   Misty Davila      Visit Information      Primary Care Provider (PCP):  MALATHI -UNKNOWN,    NPI# [id number]      Chief Complaint   2017 7:22 PM CDT    Pt c/o cough x 4 months. Low grade temps on/off. OTC lozenges to help. Refill Norco for neck/knee/migraines prn.        History of Present Illness   PPC with complaints of cough for several weeks (not a concern at our 2017 visit), she also reports low grade fevers in afternoon for past 4 weeks.  No recent travel, no rashes, occasional headaches (uses norco for this with last dispense 17)  she denies asthma, has had negative Tb golds in past, is using Beta Hcg injections for weight control but reports only having had 2 injections  no known blood clots      Review of Systems   Constitutional:  Fever.    Eye:  Negative.    Ear/Nose/Mouth/Throat:  Negative.    Respiratory:  Cough.    Cardiovascular:  Negative.    Gastrointestinal:  Negative.    Hematology/Lymphatics:  Negative.    Immunologic:  Negative.    Musculoskeletal:  Negative.    Integumentary:  Negative.    Neurologic:  Negative.    Psychiatric:  Negative.             Health Status   Allergies:    Allergic Reactions (Selected)  No known allergies   Medications:  (Selected)   Prescriptions  Prescribed  BuSpar 10 mg oral tablet: 1 tab(s) ( 10 mg ), PO, TID, # 90 tab(s), 1 Refill(s), Type: Maintenance, Pharmacy: [facility] [id number], 1 tab(s) po tid  Lexapro 20 mg oral tablet: 1 tab(s) ( 20 mg ), PO, Daily, # 90 tab(s), 3 Refill(s), Type: Maintenance, Pharmacy: [facility] [id number], 1 tab(s) po daily  Norco 5 mg-325 mg oral tablet: 1 tab(s), po, q6 hrs, Instructions: for neck and bilateral knee pain, PRN: as needed for pain, # 120 tab(s), 0 Refill(s), Type: Maintenance,  Pharmacy: [facility] [id number], 1 tab(s) po q6 hrs,PRN:as needed for pain,Instr:for neck and bilateral k...  celecoxib 100 mg oral capsule: 2 cap(s) ( 200 mg ), po, bid, # 120 cap(s), 1 Refill(s), Type: Maintenance, Pharmacy: [facility] [id number], 2 cap(s) po bid,x30 day(s)  Documented Medications  Documented  Daily Multi: 1 tab(s), po, daily, 0 Refill(s), Type: Maintenance  Vitamin D3 5000 intl units oral capsule: 1 cap(s) ( 5,000 International Unit ), po, daily, 0 Refill(s), Type: Maintenance  Xanax 0.25 mg oral tablet: 1 tab(s) ( 0.25 mg ), PO, TID, PRN: for anxiety, 0 Refill(s), Type: Maintenance  Zomig 5 mg oral tablet: 1 tab(s) ( 5 mg ), PO, Once, PRN: for migraine headache, # 10 tab(s), 0 Refill(s), Type: Maintenance      Histories   Family History:    Migraine  Mother (Lauren)  Breast Cancer  Mother (Lauren)        Physical Examination   Vital Signs   8/28/2017 7:22 PM CDT Temperature Tympanic 98.6 DegF    Peripheral Pulse Rate 74 bpm    Pulse Site Radial artery    HR Method Manual    Systolic Blood Pressure 132 mmHg    Diastolic Blood Pressure 92 mmHg  HI    Mean Arterial Pressure 105 mmHg    BP Site Right arm    BP Method Manual    Oxygen Saturation 97 %      Measurements from flowsheet : Measurements   8/28/2017 7:22 PM CDT    Weight Measured - Standard                197 lb     General:  Alert and oriented, No acute distress.    Eye:  Pupils are equal, round and reactive to light.    HENT:  Normocephalic.    Neck:  Supple, Non-tender.    Respiratory:  Lungs are clear to auscultation, Respirations are non-labored, Breath sounds are equal, No chest wall tenderness.    Cardiovascular:  Normal rate, Regular rhythm, No edema.    Musculoskeletal:  Normal range of motion, Normal gait.    Integumentary:  Warm, Dry, Pink.    Neurologic:  Alert, Oriented, Normal sensory, No focal deficits.    Psychiatric:  Cooperative, Appropriate mood & affect, Normal judgment.       Review / Management   Results  review:  Lab results   8/28/2017 8:16 PM CDT UA pH 5.5    UA Specific Gravity > OR = 1.030    UA Glucose NEGATIVE    UA Bilirubin 1+    UA Ketones NEGATIVE    Urine Occult Blood NEGATIVE    UA Protein NEGATIVE    UA Nitrite NEGATIVE    UA Leukocyte Esterase NEGATIVE   8/28/2017 8:08 PM CDT Sodium Level 143 mmol/L    Potassium Level 3.8 mmol/L    Chloride Level 111 mmol/L    CO2 Level 20 mmol/L    AGAP 12    Glucose Level 86 mg/dL    BUN 16 mg/dL    Creatinine Level 0.82 mg/dL    BUN/Creat Ratio 20    eGFR  >60    eGFR Non-African American >60    Calcium Level 9.6 mg/dL    WBC 10.5    RBC 4.78    Hgb 14.3 g/dL    Hct 42.3 %    MCV 89 fL    MCH 29.9 pg    MCHC 33.8 g/dL    RDW 13.8 %    Platelet 299    MPV 10.8 fL    Lymphocytes 23.7 %    Abs Lymphocytes 2.5    Neutrophils 61.9 %    Abs Neutrophils 6.5    Monocytes 9.9 %    Abs Monocytes 1.0    Eosinophils 4.0 %    Abs Eosinophils 0.4    Basophils 0.5 %    Abs Basophils 0.1    D-Dimer 0.51    D-Dimer Interp Abnormal    Heterophile Ab Negative   .    Radiology results   X-ray, chest xray negative  CT of chest pending      Impression and Plan   Diagnosis     Cough (AMJ61-IH R05).     Elevated d-dimer (GSZ58-HZ R79.1).     Fever (CKX98-IP R50.9).     Patient Instructions:       Counseled: Patient.    Summary:  I am unsure of cause of reported fever but labs are unimpressive at this point, I am waiting on blood culture  unfortunately the d-dimer is just above cut off and this necessitates CT scan to rule out PE, at time of charting the imaging is in process, d/t time of clinic closing I have called report to [facility] ED physician Dr JEWELL Fontanez, pt will be kept in CT until report has been called to Dr Fontanez and she has made contact with pt  I have refilled her norco which was something pt wanted to accomplish today  I have cautioned pt that there is no proof that beta Hcg injection will help with weight loss and there is risk of blood clots..    Orders      Orders (Selected)   Prescriptions  Prescribed  Norco 5 mg-325 mg oral tablet: 1 tab(s), po, q6 hrs, Instructions: for neck and bilateral knee pain, PRN: as needed for pain, # 120 tab(s), 0 Refill(s), Type: Maintenance, Pharmacy: [facility] [id number], 1 tab(s) po q6 hrs,PRN:as needed for pain,Instr:for neck and bilateral k....

## 2022-02-16 NOTE — PROGRESS NOTES
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  migraines: seen at [facility] neurology, things are better now that she is through menopause, excedrin is working now though it did not prior to menopause, still occasionally needs zomig 4-5 times a month  gabapentin is also taken for this: neurology fills these for her  2.  anxiety depression: lexaprois best treatment, gained 20lbs when first started on this and has gained another 20lbs, wellbutrin added on for weight control but anxiety came back, effexor has not been tried, has not needed anything to help sleep at night, trazodone has not been beneficial  walk twice a day for at least a mile, more recently has started [employer]  3.  herniated disc in lower cervical spine has had steroid injections in past: norco is used for this prn, uses this 5-6x/month, celebrex has been started in past month  4.  knee pain arthritic changes and left patela tilted laterally, thinks knee problem is from weight  5.  tried nutrisystem, would really like to focus on weight loss but does not feel like she will be motivated enough to attend classes or support groups  is willing to see our nutritionalist, she used topimax once for migraines and this also allowed significant weight loss, she is interested in starting this again in lieu of neurontin    mammogram 10/2016  last pap 2015: has never had an abnormal: due for next           Review of Systems   Constitutional:  Negative, wt gain.    Eye:  Negative.    Ear/Nose/Mouth/Throat:  Negative.    Respiratory:  Negative.    Cardiovascular:  Negative.    Breast:  Negative.    Gastrointestinal:  Negative.    Genitourinary:  Negative.    Gynecologic:  Negative.    Hematology/Lymphatics:  Negative.    Endocrine:  Negative.    Immunologic:  Negative.    Musculoskeletal:  Negative except as documented in history of present illness.    Integumentary:  Negative.    Neurologic:  Negative except as documented in history of present illness.    Psychiatric:  Anxiety, Depression.              Health Status   Allergies:    Allergic Reactions (Selected)  No known allergies   Medications:  (Selected)   Documented Medications  Documented  CeleBREX 100 mg oral capsule: 2 cap(s) ( 200 mg ), PO, BID, Instructions: for neck and LBP, PRN: for pain, # 180 cap(s), 0 Refill(s), Type: Maintenance  Daily Multi: 1 tab(s), po, daily, 0 Refill(s), Type: Maintenance  Lexapro 20 mg oral tablet: 1 tab(s) ( 20 mg ), PO, Daily, # 30 tab(s), 0 Refill(s), Type: Maintenance  Norco 5 mg-325 mg oral tablet: 1 tab(s), po, q6 hrs, Instructions: for neck and bilateral knee pain, PRN: as needed for pain, 0 Refill(s), Type: Maintenance  Vitamin D3 5000 intl units oral capsule: 1 cap(s) ( 5,000 International Unit ), po, daily, 0 Refill(s), Type: Maintenance  Xanax 0.25 mg oral tablet: 1 tab(s) ( 0.25 mg ), PO, TID, PRN: for anxiety, 0 Refill(s), Type: Maintenance  Zomig 5 mg oral tablet: 1 tab(s) ( 5 mg ), PO, Once, PRN: for migraine headache, # 10 tab(s), 0 Refill(s), Type: Maintenance  gabapentin 300 mg oral capsule: 2 cap(s) ( 600 mg ), po, tid, 0 Refill(s), Type: Maintenance  hydrochlorothiazide 25 mg oral tablet: 1 tab(s) ( 25 mg ), po, daily, 0 Refill(s), Type: Maintenance   Problem list:    All Problems  Anxiety / ICD-9-.00 / Confirmed  Chronic Neck Pain / ICD-9-.1 / Confirmed  Depression / ICD-9- /  Confirmed  Migraines / ICD-9-.90 / Confirmed  Obesity / SNOMED CT 1887616981 / Probable      Histories   Family History:    Breast Cancer  Mother     Procedure history:    lipoma removal on back.  avm removal L knee.   Social History:        Alcohol Assessment: Denies Alcohol Use            Never      Tobacco Assessment: Denies Tobacco Use            Never      Substance Abuse Assessment: Denies Substance Abuse            Never      Employment and Education Assessment            Employed, Work/School description: Clinical Director.      Home and Environment Assessment            Spouse/Partner name: Alan.      Nutrition and Health Assessment            Type of diet: Regular.      Exercise and Physical Activity Assessment            Exercise type: Bicycling.                     Comments:                      01/21/2013 - Morelia Acuña CMA                     3 x weekly      Sexual Assessment            Sexually active: Yes.  Sexual orientation: Heterosexual.  Contraceptive Use Details: Vaginal ring.        Physical Examination   Vital Signs   2/6/2017 5:58 PM CST Temperature Tympanic 98.4 DegF    Peripheral Pulse Rate 76 bpm    Pulse Site Radial artery    HR Method Manual    Systolic Blood Pressure 120 mmHg    Diastolic Blood Pressure 82 mmHg    Mean Arterial Pressure 95 mmHg    BP Site Right arm    BP Method Manual      General:  Alert and oriented, No acute distress.    Eye:  Pupils are equal, round and reactive to light, Intact accommodation, Normal conjunctiva, Vision unchanged.         Periorbital area: Within normal limits.    HENT:  Normocephalic, Tympanic membranes are clear, Normal hearing, Oral mucosa is moist, No pharyngeal erythema, No sinus tenderness.    Neck:  Supple, Non-tender, No lymphadenopathy, No thyromegaly.    Respiratory:  Lungs are clear to auscultation, Respirations are non-labored, No chest wall tenderness.    Cardiovascular:  Normal rate, Regular rhythm, No murmur, No edema.     Breast:  No mass, No tenderness.    Gastrointestinal:  Soft, Non-tender, Non-distended, Normal bowel sounds, No organomegaly.    Genitourinary:  No costovertebral angle tenderness.    Lymphatics:  No lymphadenopathy neck, axilla, groin.    Musculoskeletal:  Normal range of motion, Normal strength, No swelling.    Integumentary:  Warm, Dry, Pink.    Neurologic:  Alert, Oriented.    Psychiatric:  Cooperative, Appropriate mood & affect.       Review / Management   Results review:  CMP and hgba1c.       Impression and Plan   Diagnosis     Anxiety (MBV40-RJ F41.9).     Chronic Neck Pain (RVO01-ZX M54.2).     Joint pain (BSM74-BJ M25.50).     Migraines (KMT49-AH G43.909).     Obesity (VIV48-RK E66.9).     Well woman exam (YBJ87-EL Z01.419).     Patient Instructions:       Counseled: Patient, Verbalized understanding.    Summary:  zomig and gabapentin will be filled by neurology, her thoughts of changing to topimax are interesting but she needs to discuss this with neurology  if her anxiety is improved and she attributes weight gain to lexparo I would like to lower her dose to 10mg dialy and see how both her weight and her anxiety does, she is will to do this  referral to nutritionalist provided  pap 2018, mammogram annually  exercise is encouraged but I recognize that this is difficult with if she has joint pain  if hgba1c is elevated we can consider metformin.    Orders     Orders (Selected)   Prescriptions  Prescribed  CeleBREX 100 mg oral capsule: 2 cap(s) ( 200 mg ), PO, BID, Instructions: for neck and LBP, PRN: for pain, # 120 cap(s), 0 Refill(s), Type: Maintenance, Pharmacy: [facility] [id number], 2 cap(s) po bid,PRN:for pain,Instr:for neck and LBP  Lexapro 10 mg oral tablet: 1 tab(s) ( 10 mg ), PO, Daily, # 90 tab(s), 1 Refill(s), Type: Maintenance, Pharmacy: [facility] [id number], 1 tab(s) po daily  Norco 5 mg-325 mg oral tablet: 1 tab(s), po, q6 hrs, Instructions: for neck and bilateral knee pain,  PRN: as needed for pain, # 120 tab(s), 0 Refill(s), Type: Maintenance, Pharmacy: [facility] [id number], 1 tab(s) po q6 hrs,PRN:as needed for pain,Instr:for neck and bilateral k....

## 2022-02-16 NOTE — PROGRESS NOTES
Patient:   LULU LARA            MRN: [id number]            FIN: [id number]               Age:   52 years     Sex:  Female     :  1965   Associated Diagnoses:   Anxiety disorder; Obesity   Author:   Misty Davila      Visit Information      Primary Care Provider (PCP):  RF97 -UNKNOWN,      Chief Complaint   2017 6:14 PM CDT     f/u anxiety and wt. Was not able to start Saxenda d/t insurance not willing to cover.      History of Present Illness   PPC for a refill of her lexapro, out of concern for weight gain on anti-depressants Karlene wanted to lower her lexapro dose to 10mg daily and try that but it was not helping and she found herself crying frequently and had to increase her dose back to 20mg dialy which is helping and is where she would like to stay  reagrding weight she is trying to exercise daily   walking daily, cardio at home, in work weight training  hydroxycut for weight loss but causing mild diarrhea  down 7 pounds in one yr  trying to get 1500 kcal in daily and is trying to monitor this       Review of Systems   Constitutional:  Negative.    Ear/Nose/Mouth/Throat:  Negative.    Respiratory:  Negative.    Cardiovascular:  Negative.    Gastrointestinal:  Negative.    Hematology/Lymphatics:  Negative.    Immunologic:  Negative.    Musculoskeletal:  Negative.    Integumentary:  Negative.    Neurologic:  Negative.    Psychiatric:  Anxiety, Depression, No lashanda, Not suicidal, Not delusional, No hallucinations.       Health Status   Allergies:    Allergic Reactions (Selected)  No known allergies   Medications:  (Selected)   Prescriptions  Prescribed  Lexapro 20 mg oral tablet: 1 tab(s) ( 20 mg ), PO, Daily, # 90 tab(s), 1 Refill(s), Type: Maintenance, [facility] [id number], 1 tab(s) po daily  Norco 5 mg-325 mg oral tablet: 1 tab(s), po, q6 hrs, Instructions: for neck and bilateral knee pain, PRN: as needed for pain, # 120 tab(s), 0 Refill(s), Type: Maintenance, Pharmacy:  [facility] [id number], 1 tab(s) po q6 hrs,PRN:as needed for pain,Instr:for neck and bilateral k...  celecoxib 100 mg oral capsule: 2 cap(s) ( 200 mg ), po, bid, # 120 cap(s), 0 Refill(s), Type: Maintenance, Pharmacy: [facility] [id number], Due for visit  Documented Medications  Documented  Daily Multi: 1 tab(s), po, daily, 0 Refill(s), Type: Maintenance  Vitamin D3 5000 intl units oral capsule: 1 cap(s) ( 5,000 International Unit ), po, daily, 0 Refill(s), Type: Maintenance  Xanax 0.25 mg oral tablet: 1 tab(s) ( 0.25 mg ), PO, TID, PRN: for anxiety, 0 Refill(s), Type: Maintenance  Zomig 5 mg oral tablet: 1 tab(s) ( 5 mg ), PO, Once, PRN: for migraine headache, # 10 tab(s), 0 Refill(s), Type: Maintenance  hydrochlorothiazide 25 mg oral tablet: 1 tab(s) ( 25 mg ), po, daily, 0 Refill(s), Type: Maintenance   Problem list:    All Problems (Selected)  Anxiety disorder / SNOMED CT 222030578 / Confirmed  Chronic back pain / SNOMED CT 411687456 / Confirmed  Chronic neck pain / SNOMED CT 8010247494 / Confirmed  Depression / SNOMED CT 4625630858 / Confirmed  Migraines / SNOMED CT 03602127 / Confirmed  Obesity / SNOMED CT 0241334126 / Probable      Histories   Past Medical History:    Active  Anxiety disorder (262597023)  Depression (1726193956)  Migraines (00582363)  Chronic neck pain (3361109405)  Chronic back pain (014071271)  Resolved  ASCUS (28605873): Onset on 3/28/2008 at 42 years.  Resolved.  Comments:  2/13/2017 CST 6:18 PM CST - Ella Zamudio CMA  reflex negative HPV DNA  History of chicken pox (065817718):  Resolved.  Pregnancy (592929544):  Resolved in 1991 at 25 years.  Pregnancy (911418680):  Resolved in 1994 at 28 years.   Family History:    Migraine  Mother (Lauren)  Breast Cancer  Mother (Lauren)     Social History:        Alcohol Assessment: Denies Alcohol Use            Never      Tobacco Assessment: Past      Substance Abuse Assessment: Denies Substance Abuse            Never      Employment and  Education Assessment            Employed, Work/School description: Clinical Director.      Home and Environment Assessment            Spouse/Partner name: Alan.      Nutrition and Health Assessment            Type of diet: Regular.      Exercise and Physical Activity Assessment: Regular exercise            Exercise frequency: Daily.  Exercise type: Walking, yoga/pilates every day.      Sexual Assessment            Sexually active: Yes.  Sexual orientation: Heterosexual.  Contraceptive Use Details: Vaginal ring.        Physical Examination   Vital Signs   6/5/2017 6:14 PM CDT Peripheral Pulse Rate 72 bpm    Pulse Site Radial artery    HR Method Manual    Systolic Blood Pressure 118 mmHg    Diastolic Blood Pressure 82 mmHg    Mean Arterial Pressure 94 mmHg    BP Site Right arm    BP Method Manual      General:  Alert and oriented, No acute distress.    Eye:  Pupils are equal, round and reactive to light.    HENT:  Normocephalic.    Neck:  Supple.    Musculoskeletal:  Normal range of motion.    Integumentary:  Warm, Dry, Pink.    Neurologic:  Alert, Oriented, Normal sensory, No focal deficits.    Psychiatric:  Cooperative, Appropriate mood & affect, Normal judgment, Non-suicidal.       Impression and Plan   Diagnosis     Anxiety disorder (OYQ34-MJ F41.9).     Obesity (TBD84-FC E66.9).     Patient Instructions:       Counseled: Patient, Regarding diagnosis, Regarding treatment, Regarding medications, Activity, Verbalized understanding.    Summary:  lexapro refilled for one yr at 20mg dose, discussed on going weight concerns, when she is ready to meet with Fadumo Kang again she will let me know, saxenda was not covered by insurance  regarding preventative testing she has mammograms done through [facility], .    Orders     Orders (Selected)   Prescriptions  Prescribed  Lexapro 20 mg oral tablet: 1 tab(s) ( 20 mg ), PO, Daily, # 90 tab(s), 3 Refill(s), Type: Maintenance, Pharmacy: [facility] [id number], 1 tab(s) po  daily.

## 2022-02-16 NOTE — PROGRESS NOTES
Patient:   LULU LARA            MRN: [id number]            FIN: [id number]               Age:   56 years     Sex:  Female     :  1965   Associated Diagnoses:   Pre-op exam; GI bleed; Low hemoglobin   Author:   Loraine Kelly      Preoperative Information   Here for preop history and physical      Chief Complaint   9/10/2021 1:21 PM CDT    1) pre op exam: balloon endoscopy scheduled 21 at [facility] with Dr Persaud 2) f/u hospital stay for GI bleed, would like her Hgb check as it was low during hospital stay     HPI:  I had the pleasure of meeting Lulu.  She was recently hospitalized with a GI bleed on  for three nights. I do not have those records. She reports that she started vomiting and stooling blood and her  brought her to the ER where she was transferred and admitted to [facility]. There she reports having an endoscopy and colonoscopy and a hemoglobin of 7.3.  She received 1 unit of blood and on her last day there had a 'capsule endoscopy' and it identified a bleed in her small intestines.  She is scheduled for a balloon therapy.  She is currently taking ferrous sulfate 325mg daily and omeprazole 20mg bid and has had no further bleeding since returning home on 21.  Her hemoglobin today 9.8 and she has had no further bleeding         Review of Systems   Constitutional:  Negative.    Weight has been stable, no nutritional concerns  No Allergies to latex, iodine, contrast dye, shell fish or local anesthetics   Eye:  Negative.    Ear/Nose/Mouth/Throat:  Negative.    Respiratory:  Negative.    No history of sleep apnea   Cardiovascular:  No palpitations, No bradycardia.    Genitourinary:  Negative.    Pregnancy ruled out-post menopausal   Endocrine:  Negative.    Immunologic:  Negative.    Musculoskeletal:  Negative.    Integumentary:  Negative.    Neurologic:  Negative.    Psychiatric:  Negative.    All other systems reviewed and negative     Has  no history of  DVT or pulmonary embolism.    Has  no personal or family history of anesthesia reactions.  Patient does not have active tuberculosis.    S/he  has not taken aspirin or aspirin containing products in the last week.     S/he  has not taken Plavix (Clopidogrel) in the last 2 weeks.    S/he  has not taken warfarin in the past week.    S/he  has not been on corticosteroids for more than 2 weeks recently.      S/he  is not DNR before, during or after surgery.    Chest pain / SOB walking up 2 flights of steps? No  Pain in neck or jaw? No  CAD MI?  NO  Afib? NO  Heart Failure? No  Asthma  or Bronchitis? NO  Diabetes? NO       Seizure Disorder? No  CKD? No  Thyroid Disease? No  Liver Disease No  CVA? No           Health Status   Allergies:    Allergic Reactions (Selected)  No Known Medication Allergies   Problem list:    All Problems  Obesity / SNOMED CT 1587630174 / Probable  Depression / SNOMED CT 6649601188 / Confirmed  Migraines / SNOMED CT 19016829 / Confirmed  GI bleed / SNOMED CT 502004143 / Confirmed  Chronic neck pain / SNOMED CT 9704519688 / Confirmed  Chronic back pain / SNOMED CT 146087398 / Confirmed  Anxiety disorder / SNOMED CT 023181744 / Confirmed  Resolved: Pregnancy / SNOMED CT 933621720  Resolved: Pregnancy / SNOMED CT 860137661  Resolved: History of chicken pox / SNOMED CT 104461570  Resolved: ASCUS / SNOMED CT 88555939  reflex negative HPV DNA   Medications:  (Selected)   Prescriptions  Prescribed  Lexapro 20 mg oral tablet: = 1 tab(s) ( 20 mg ), PO, Daily, # 90 tab(s), 3 Refill(s), Type: Maintenance, [id number], 1 tab(s) Oral daily  Documented Medications  Documented  Daily Multi: 1 tab(s), po, daily, 0 Refill(s), Type: Maintenance  Zomig 5 mg oral tablet: 1 tab(s) ( 5 mg ), PO, Once, PRN: for migraine headache, # 10 tab(s), 0 Refill(s), Type: Maintenance  ferrous fumarate 325 mg (106 mg elemental iron) oral tablet: = 1 tab(s) ( 325 mg ), Oral, daily, 0 Refill(s), Type:  Maintenance  omeprazole 20 mg oral delayed release capsule: = 1 cap(s) ( 20 mg ), Oral, bid, 0 Refill(s), Type: Maintenance      Histories   Past Medical History:    Active  Anxiety disorder (514187683)  Depression (3954226796)  Migraines (74096927)  Chronic neck pain (2194795573)  Chronic back pain (708604280)  Resolved  ASCUS (54322059): Onset on 3/28/2008 at 42 years.  Resolved.  Comments:  2/13/2017 CST 6:18 PM Ella Ye CMA  reflex negative HPV DNA  History of chicken pox (440701841):  Resolved.  Pregnancy (032850244):  Resolved in 1991 at 25 years.  Pregnancy (188856910):  Resolved in 1994 at 28 years.   Family History:    Hypertension  Mother (Lauren)  Migraine  Mother (Lauren)  Depression  Mother (Lauren)  Breast Cancer  Mother (Lauren)  High cholesterol  Mother (Lauren)     Procedure history:    Ultrasound (SNOMED CT 070335586) on 11/11/2011 at 46 Years.  Comments:  2/13/2017 5:53 PM Ella Ye CMA  abdominal u/s   conclusion: Several calcified splenic granulomas, benign etiology. No additional acute abdominal ultrasound abnormality.  Plain X-ray lumbar spine normal (SNOMED CT 069402889) on 7/18/2011 at 46 Years.  Comments:  2/13/2017 5:57 PM Ella Ye CMA  within normal limits for age  CT of abdomen and pelvis (SNOMED CT 3346875427) on 2/19/2011 at 45 Years.  Comments:  2/13/2017 5:59 PM Ella Ye CMA  conclusion:   1. No definite renal calculi, renal mass or obstructive process.  2. No distinct abdominal or pelvic mass.  X-ray of abdomen (SNOMED CT 754700610) on 7/28/2008 at 43 Years.  Comments:  2/13/2017 6:03 PM Ella Ye CMA  Impression: The most inferior medial right pelvic calcification could represent te right ureterovesical junction stone but there was a second calcification at this level on the CT compatible with phlebolith. It is indeterminate whether this calcification represents the phlebolith or the ureteral stone at the same  approximate level.  Ultrasound (SNOMED CT 687357007) on 7/17/2008 at 43 Years.  Comments:  2/13/2017 6:11 PM Ella Ye CMA  u/s abdomen  Impression:   1. Contracted gallbladder without evidence of cholelithiasis. No convincing evidence of cholecystitis.  2. Mild right-sided hydronephrosis. This is consistent with a distal right ureteral calculus demonstrated on subsequent CT scan.  CT of abdomen and pelvis (SNOMED CT 1812229290) on 7/17/2008 at 43 Years.  Comments:  2/13/2017 6:15 PM Ella Ye CMA  Impression:   1. 3 mm calculus at the distal right ureterovesicular junction with mild proximal hydronephrosis.  2. No other renal, ureteral or bladder calculi are demonstrated .  MRI of cervical spine (SNOMED CT 840620020) on 10/26/2007 at 42 Years.  Comments:  2/13/2017 6:31 PM Ella Ye CMA  Conclusion:  1. Moderate C5-6 degenerative disc disease with a 2.5 mm right posterolateral and foraminal disc herniation. This contacts the ventral cord with moderate foraminal encroachment and right C6 nerve root impingement.  2. A midline annular fissure and bulge at C4-5 contacts the ventral surface of the cord.  lipoma removal on back.  avm removal L knee.   Social History:        Electronic Cigarette/Vaping Assessment            Electronic Cigarette Use: Never.      Alcohol Assessment            Never      Tobacco Assessment            Former smoker, quit more than 30 days ago      Substance Abuse Assessment            Never      Employment and Education Assessment            Employed, Work/School description: -Dialysis.      Home and Environment Assessment            Marital status: .  Spouse/Partner name: Alan.      Nutrition and Health Assessment            Type of diet: Regular.      Exercise and Physical Activity Assessment            Exercise frequency: Daily.  Exercise type: Walking, Weight lifting, cardio.      Sexual Assessment            Sexually active:  Yes.  Sexual orientation: Heterosexual.  Contraceptive Use Details: Vaginal ring.  ,        Electronic Cigarette/Vaping Assessment            Electronic Cigarette Use: Never.      Alcohol Assessment            Never      Tobacco Assessment            Former smoker, quit more than 30 days ago      Substance Abuse Assessment            Never      Employment and Education Assessment            Employed, Work/School description: -Dialysis.      Home and Environment Assessment            Marital status: .  Spouse/Partner name: Alna.      Nutrition and Health Assessment            Type of diet: Regular.      Exercise and Physical Activity Assessment            Exercise frequency: Daily.  Exercise type: Walking, Weight lifting, cardio.      Sexual Assessment            Sexually active: Yes.  Sexual orientation: Heterosexual.  Contraceptive Use Details: Vaginal ring.        Physical Examination   Vital Signs   9/10/2021 1:21 PM CDT Temperature Tympanic 98.5 DegF    Peripheral Pulse Rate 81 bpm    Systolic Blood Pressure 128 mmHg    Diastolic Blood Pressure 84 mmHg  HI    Mean Arterial Pressure 99 mmHg    BP Site Right arm    BP Method Manual    Oxygen Saturation 98 %      Measurements from flowsheet : Measurements   9/10/2021 1:21 PM CDT Height Measured - Standard 65.75 in    Weight Measured - Standard 194.2 lb    BSA 2.02 m2    Body Mass Index 31.58 kg/m2  HI      General:  Alert and oriented, No acute distress.    Eye:  Normal conjunctiva.    HENT:  Normocephalic, Tympanic membranes are clear, Oral mucosa is moist, No pharyngeal erythema, Mallampati Score 1.    Neck:  Supple, Non-tender, No lymphadenopathy, No thyromegaly.    Respiratory:  Breath sounds are equal, Symmetrical chest wall expansion.         Respirations: Are within normal limits.         Pattern: Regular.         Breath sounds: Bilateral, Within normal limits.    Cardiovascular:  Normal rate, Regular rhythm, No murmur, Good  pulses equal in all extremities, Normal peripheral perfusion, No edema.    Gastrointestinal:  Soft, Non-tender, Non-distended.    Musculoskeletal:  Normal range of motion, Normal strength, Normal gait.    Integumentary:  Warm, Dry, Intact, No rash.    Neurologic:  Alert, Oriented, Normal motor function, No focal deficits.    Psychiatric:  Cooperative, Appropriate mood & affect.       Impression and Plan   Diagnosis     Pre-op exam (VOH01-EV Z01.818).     GI bleed (AUW78-ND K92.2).     Low hemoglobin (QCB43-NC D64.9).     Condition:  Stable,  Stable, no contraindication for general anesthesia or surgical procedure.. .    Orders     No SBE prophylaxis or DVT prophylaxis necessary .

## 2022-02-16 NOTE — PROCEDURES
Accession Number:       [id number]  CLINICAL INFORMATION::     None given  LMP::     POST MENOPAUSAL  PREV. PAP::     2015  PREV. BX::     NONE GIVEN  SOURCE::     Cervix  STATEMENT OF ADEQUACY::     Satisfactory for evaluation. Endocervical/transformation zone component present.  INTERPRETATION/RESULT::     Negative for intraepithelial lesion or malignancy.  COMMENT::     See comment       This case could not be evaluated with computer       assisted technology. The slide was manually       screened according to routine procedures.  CYTOTECHNOLOGIST::     SHYANNE CRUZ (ASCP) CT Screening location: [place] [address]

## 2022-02-16 NOTE — TELEPHONE ENCOUNTER
---------------------  From: Loraine Kelly   To: LULU LARA    Sent: 11/1/2021 5:20:11 PM CDT  Subject: General Message       Your pap smear is normal and the screening test for high risk strains of human papilloma virus is negative.  These tests should be repeated in 5 years.  You should return in one year for your annual physical , but a pap smear will not be necessary at that time.    HOME ChaudhryNP

## 2022-02-16 NOTE — TELEPHONE ENCOUNTER
---------------------From: Doris Lee LPN (Phone Messages Pool ([id number]_[facility])) To: LULU LARA  Sent: 4/8/2019 9:25:25 AM CDTSubject: FW: Lab results Lulu,Thank you for sending it again, but we did receive it the first time on 4-4-2019. It looks like the lab results were mailed to them as well. Please let us know if they have not gotten them and we can resend them if you need. Doris Lee LPN---------------------From: LULU Villasenor: [facility] HospitalSent: 04/08/2019 09:12 a.m. CDTSubject: Lab resultsHello,I sent over the authorization for lab results last week. I wanted to be sure you received it? I am attaching again in case it was not received.Karlene

## 2022-02-16 NOTE — TELEPHONE ENCOUNTER
Entered by Angela Hood LPN on September 13, 2021 11:47:55 AM CDT  Peter Merino,    Your Hgb was 9.8. These results are in your chart. Could you please try again, hopefully you will be able to see them now.  Let us know if you have any problems or additional questions.    Thanks,  [place]      ---------------------  From: Carlos/Priscila NOLAN (Phone Messages Pool ([id number]_[facility]))   To: [employer] Message Pool ([id number]_[facility]);     Sent: 9/13/2021 7:52:45 AM CDT  Subject: FW: Hgb results           ---------------------  From: LULU LARA  To: [facility]  Sent: 09/11/2021 10:38 a.m. CDT  Subject: Hgb results  Hello  Requesting my hgb result drawn 9/10/21. I don t see it on the portal.  Thank you!  Karlene---------------------  From: Angela Hood LPN ([employer] Message Pool ([id number]_[facility]))   To: LULU LARA    Sent: 9/13/2021 11:48:01 AM CDT  Subject: FW: Hgb results

## 2022-02-16 NOTE — LETTER
(Inserted Image. Unable to display)   March 19, 2019      LULU LARA  [address]  [place], [place] [id number]        Dear LULU,      Thank you for selecting [facility] for your healthcare needs.      Our records indicate you are due for the following services:        Follow-up office visit.      To schedule an appointment or if you have further questions, please contact your primary clinic:   [facility]       [phone]   [facility]       [phone]              [facility]     [phone]      Powered by [employer]    Sincerely,    Misty Whaley, CHALINOCNP

## 2022-03-02 NOTE — TELEPHONE ENCOUNTER
---------------------  From: Allyson Dozier RN (Phone Messages Pool ([id number]_[facility]))   To: [employer] Message Pool ([id number]);     Sent: 1/4/2022 9:17:44 AM CST  Subject: FW: Medication refills     Pt had telephone visit for pain/HTN/OA on 12/29/21    HCTZ was filled 10/28/21 for 25 mg po daily with 90 tabs and 1 refill, so this one should be ok.    Omeprazole last filled 10/28/21 for 20 mg daily with 90 caps, 1 refill. This one will need to be updated with dosing if appropriate.    Please advise.        ---------------------  From: LULU LARA  To: [facility]  Sent: 01/04/2022 08:43 a.m. CST  Subject: Medication refills  Hi!  I have 2 prescriptions at [facility] pharmacy that won t let me refill because it says too early.  The Hydrochlorothiazide we changed to 25 mg (one tablet) early on in the prescription. Omeprazole I take 2 times per day rather than 1 time per day as documented.  Can you update scripts so I am able to get these 2 filled? I am out of both of these now.  Thank you much!  Karlene---------------------  From: Angela Hood LPN ([employer] Message Pool ([id number]_[facility]))   To: Loraine Kelly;     Sent: 1/4/2022 9:18:49 AM CST  Subject: FW: Medication refills---------------------  From: Loraine Kelly   To: LULU LARA    Sent: 1/4/2022 9:37:16 AM CST  Subject: RE: Medication refills     I sent refills of both with the corrected dose, thanks for coming back for repeat blood pressure, it is excellent. Take care,    TAYLER Chaudhry

## 2022-03-02 NOTE — TELEPHONE ENCOUNTER
---------------------  From: Loraine Kelly   To: LULU LARA    Sent: 1/26/2022 8:04:44 AM CST  Subject: General Message     This letter is to inform you that I have received a copy of your mammogram results.  Your results were NORMAL.  You will also receive notice of your results from the radiologist within 7-10 days.  This letter is to let you know I have also received a copy and it is filed in your clinic chart.    Please plan on having your next mammogram done in 12 months.  Thank you.    KOBY Chaudhry

## 2022-03-07 ENCOUNTER — TELEPHONE (OUTPATIENT)
Dept: FAMILY MEDICINE | Facility: CLINIC | Age: 57
End: 2022-03-07
Payer: COMMERCIAL

## 2022-03-07 NOTE — TELEPHONE ENCOUNTER
Reason for Call:  Other Lab request    Detailed comments: Patient is feeling low energy and would like Hemoglobin tested.  Would like a lab order written.    Phone Number Patient can be reached at: Cell number on file:    Telephone Information:   Mobile [phone]       Best Time: anytime    Can we leave a detailed message on this number? YES    Call taken on 3/7/2022 at 2:18 PM by DONNA OAKES

## 2022-03-08 NOTE — TELEPHONE ENCOUNTER
She can order it herself in [facility] if she wants otherwise she can schedule a visit to discuss and see what labs would be warranted. Please let her know

## 2022-03-08 NOTE — TELEPHONE ENCOUNTER
1148 Spoke with patient and discussed options for testing. Pt would like to schedule an appt - transferred to scheduling.

## 2022-03-30 ENCOUNTER — OFFICE VISIT (OUTPATIENT)
Dept: FAMILY MEDICINE | Facility: CLINIC | Age: 57
End: 2022-03-30
Payer: COMMERCIAL

## 2022-03-30 VITALS
DIASTOLIC BLOOD PRESSURE: 88 MMHG | HEART RATE: 92 BPM | SYSTOLIC BLOOD PRESSURE: 120 MMHG | BODY MASS INDEX: 30.28 KG/M2 | TEMPERATURE: 98.3 F | WEIGHT: 186.2 LBS | OXYGEN SATURATION: 98 %

## 2022-03-30 DIAGNOSIS — R53.83 FATIGUE, UNSPECIFIED TYPE: Primary | ICD-10-CM

## 2022-03-30 DIAGNOSIS — F33.1 MAJOR DEPRESSIVE DISORDER, RECURRENT EPISODE, MODERATE (H): ICD-10-CM

## 2022-03-30 PROBLEM — R63.2 BINGE EATING: Status: ACTIVE | Noted: 2021-12-29

## 2022-03-30 PROBLEM — I10 PRIMARY HYPERTENSION: Status: ACTIVE | Noted: 2022-03-30

## 2022-03-30 PROBLEM — G43.909 MIGRAINE HEADACHE: Status: ACTIVE | Noted: 2022-03-30

## 2022-03-30 PROBLEM — M17.9 OSTEOARTHRITIS OF KNEE: Status: ACTIVE | Noted: 2022-02-14

## 2022-03-30 PROBLEM — K92.2 GASTROINTESTINAL HEMORRHAGE: Status: ACTIVE | Noted: 2021-08-18

## 2022-03-30 LAB
ERYTHROCYTE [DISTWIDTH] IN BLOOD BY AUTOMATED COUNT: 12.8 % (ref 10–15)
HCT VFR BLD AUTO: 45.5 % (ref 35–47)
HGB BLD-MCNC: 14.7 G/DL (ref 11.7–15.7)
MCH RBC QN AUTO: 28.9 PG (ref 26.5–33)
MCHC RBC AUTO-ENTMCNC: 32.3 G/DL (ref 31.5–36.5)
MCV RBC AUTO: 90 FL (ref 78–100)
PLATELET # BLD AUTO: 388 10E3/UL (ref 150–450)
RBC # BLD AUTO: 5.08 10E6/UL (ref 3.8–5.2)
WBC # BLD AUTO: 11.7 10E3/UL (ref 4–11)

## 2022-03-30 PROCEDURE — 36415 COLL VENOUS BLD VENIPUNCTURE: CPT | Performed by: NURSE PRACTITIONER

## 2022-03-30 PROCEDURE — 85027 COMPLETE CBC AUTOMATED: CPT | Performed by: NURSE PRACTITIONER

## 2022-03-30 PROCEDURE — 96127 BRIEF EMOTIONAL/BEHAV ASSMT: CPT | Performed by: NURSE PRACTITIONER

## 2022-03-30 PROCEDURE — 99214 OFFICE O/P EST MOD 30 MIN: CPT | Performed by: NURSE PRACTITIONER

## 2022-03-30 RX ORDER — BUPROPION HYDROCHLORIDE 150 MG/1
150 TABLET ORAL EVERY MORNING
Qty: 30 TABLET | Refills: 1 | Status: SHIPPED | OUTPATIENT
Start: 2022-03-30 | End: 2022-05-03

## 2022-03-30 ASSESSMENT — ANXIETY QUESTIONNAIRES
7. FEELING AFRAID AS IF SOMETHING AWFUL MIGHT HAPPEN: NOT AT ALL
GAD7 TOTAL SCORE: 1
5. BEING SO RESTLESS THAT IT IS HARD TO SIT STILL: NOT AT ALL
3. WORRYING TOO MUCH ABOUT DIFFERENT THINGS: SEVERAL DAYS
GAD7 TOTAL SCORE: 1
7. FEELING AFRAID AS IF SOMETHING AWFUL MIGHT HAPPEN: NOT AT ALL
4. TROUBLE RELAXING: NOT AT ALL
1. FEELING NERVOUS, ANXIOUS, OR ON EDGE: NOT AT ALL
GAD7 TOTAL SCORE: 1
6. BECOMING EASILY ANNOYED OR IRRITABLE: NOT AT ALL
2. NOT BEING ABLE TO STOP OR CONTROL WORRYING: NOT AT ALL

## 2022-03-30 ASSESSMENT — PATIENT HEALTH QUESTIONNAIRE - PHQ9
SUM OF ALL RESPONSES TO PHQ QUESTIONS 1-9: 15
SUM OF ALL RESPONSES TO PHQ QUESTIONS 1-9: 15
10. IF YOU CHECKED OFF ANY PROBLEMS, HOW DIFFICULT HAVE THESE PROBLEMS MADE IT FOR YOU TO DO YOUR WORK, TAKE CARE OF THINGS AT HOME, OR GET ALONG WITH OTHER PEOPLE: SOMEWHAT DIFFICULT

## 2022-03-30 NOTE — PROGRESS NOTES
"  Assessment & Plan     Fatigue, unspecified type  She has had TSH check and normal in the recent past  - CBC with platelets    Major depressive disorder, recurrent episode, moderate (H)  Add wellbutrin, f/u in 3-4 weeks  - buPROPion (WELLBUTRIN XL) 150 MG 24 hr tablet; Take 1 tablet (150 mg) by mouth every morning             BMI:   Estimated body mass index is 30.28 kg/m  as calculated from the following:    Height as of 12/29/21: 1.67 m (5' 5.75\").    Weight as of this encounter: 84.5 kg (186 lb 3.2 oz).       Depression Screening Follow Up    PHQ 3/30/2022   PHQ-9 Total Score 15   Q9: Thoughts of better off dead/self-harm past 2 weeks Not at all         Follow Up Actions Taken  Crisis resource information provided in After Visit Summary  Follow up recommended: 3 weeks         No follow-ups on file.    Loraine Buchanan NP  [facility]    Cari Merino is a 56 year old who presents for the following health issues: discuss depression medication, not sure it is effective, f/u on low Hgb due to GI bleed       Reason for visit:  _  10/28/2021   She does need something for pain control in her knees, both will require knee replacements, she is considering this next summer. She is using TYlenol 1000mg bid with no relate, has voltaren gel with minimal relief, has used gabapentin in past for HAs with some side effects that affect her mentation and she would jprefer not to use that again.  Does not want opiods but tramadol a couple times a day in the past has been helpful. She would consider Butrans patch with a little more information.  She uses warm shower in the morning, ice and walking hourly to help with pain     She has hx of HTN, last few bps have been high.  Will restart HCTZ at lower dose than before and emphasized potassium intake     11/30/2021  She is monitoring bp at home and still 140 systolic, 70 diastolic. No side effects. Will increase to full 25 mg daily HCTZ  Butrans is " helping but only a small amount. No using the Tramadol. is using 1300mg Acetaminophen twice a day. 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   She is feeling much better relief of chronic pain with the Butrans at 10 mcg as opposed to the 5mcg dose. No side effects. It does seem to wear off after about 5 1/2 days.   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     Patient is here for couple things  1.  She has stopped the Butrans.  She has been successful treating her hoarding disorder with a stimulant and has also lost 50 pounds in the last year.  This is helped with her knee pain but the knee pain is chronic and she cannot use NSAIDs due to a GI bleed last summer and she has started the process of working to get her knee replaced this spring.  We reviewed her med list in detail today and currently her pain is controlled with ice and ibuprofen.    2.  She is having profound fatigue.  She would like to recheck her hemoglobin given her recent GI bleed however she has had no stool changes.  She has had no stomach pain no night sweats no joint pain.  She is just having a great deal of difficulty getting out of bed in the morning.  She has had severe depression in the past that required augmentation of her SSRI and she wonders if that might be what is going on.  She has used Abilify and Wellbutrin in the past and being that the symptoms have gone on for 3 weeks she would like to treat  for depression and see if that helps resolve her fatigue.    History of Present Illness       Mental Health Follow-up:  Patient presents to follow-up on Depression.Patient's depression since last visit has been:  Worse  The patient is having other symptoms associated with depression.      Any significant life events: other  Patient is not feeling anxious or having panic attacks.  Patient has no concerns about alcohol or drug use.       Today's PHQ-9         PHQ-9 Total Score: 15  PHQ-9 Q9 Thoughts of better off dead/self-harm past 2 weeks :   (P) Not at all    How difficult have these problems made it for you to do your work, take care of things at home, or get along with other people: Somewhat difficult    Today's JUANITA-7 Score: 1    Reason for visit:  Fatigue, low energy, poor appetite, excessive need to sleep and some sadness  Symptom onset:  3-4 weeks ago  Symptoms include:  Fatigue, low energy, excessive need to sleep and some sadness  Symptom intensity:  Moderate  Symptom progression:  Staying the same  Had these symptoms before:  Yes  Has tried/received treatment for these symptoms:  Yes  Previous treatment was successful:  Yes  Prior treatment description:  Added antidepressant  What makes it worse:  No  What makes it better:  No    She eats 0-1 servings of fruits and vegetables daily.She consumes 3 sweetened beverage(s) daily.She exercises with enough effort to increase her heart rate 9 or less minutes per day.  She exercises with enough effort to increase her heart rate 3 or less days per week.   She is taking medications regularly.             Review of Systems         Objective    /88 (BP Location: Right arm, Patient Position: Sitting)   Pulse 92   Temp 98.3  F (36.8  C)   Wt 84.5 kg (186 lb 3.2 oz)   LMP 08/06/2012   SpO2 98%   BMI 30.28 kg/m    Body mass index is 30.28 kg/m .  Physical Exam     She is alert and oriented no acute distress skin warm pink and dry apical pulse regular rate rhythm  lungs are clear no edema or abdominal pain

## 2022-03-31 ASSESSMENT — ANXIETY QUESTIONNAIRES: GAD7 TOTAL SCORE: 1

## 2022-03-31 ASSESSMENT — PATIENT HEALTH QUESTIONNAIRE - PHQ9: SUM OF ALL RESPONSES TO PHQ QUESTIONS 1-9: 15

## 2022-04-03 ENCOUNTER — HEALTH MAINTENANCE LETTER (OUTPATIENT)
Age: 57
End: 2022-04-03

## 2022-04-11 ENCOUNTER — MYC MEDICAL ADVICE (OUTPATIENT)
Dept: FAMILY MEDICINE | Facility: CLINIC | Age: 57
End: 2022-04-11
Payer: COMMERCIAL

## 2022-04-11 DIAGNOSIS — G43.901 MIGRAINE WITH STATUS MIGRAINOSUS, NOT INTRACTABLE, UNSPECIFIED MIGRAINE TYPE: Primary | ICD-10-CM

## 2022-04-12 RX ORDER — ZOLMITRIPTAN 5 MG/1
5 TABLET, ORALLY DISINTEGRATING ORAL
Qty: 12 TABLET | Refills: 0 | Status: SHIPPED | OUTPATIENT
Start: 2022-04-12 | End: 2022-07-26

## 2022-04-12 NOTE — TELEPHONE ENCOUNTER
Dr. Dileep Baron is out of clinic and patient is requesting a change in her prescription.    Patient is requesting Zomig 5mg prescription in a orally disintegrating tablet.    Please advise.    Dutch Soto RN  [facility]

## 2022-04-29 ASSESSMENT — PATIENT HEALTH QUESTIONNAIRE - PHQ9
SUM OF ALL RESPONSES TO PHQ QUESTIONS 1-9: 0
10. IF YOU CHECKED OFF ANY PROBLEMS, HOW DIFFICULT HAVE THESE PROBLEMS MADE IT FOR YOU TO DO YOUR WORK, TAKE CARE OF THINGS AT HOME, OR GET ALONG WITH OTHER PEOPLE: NOT DIFFICULT AT ALL
SUM OF ALL RESPONSES TO PHQ QUESTIONS 1-9: 0

## 2022-04-30 ASSESSMENT — PATIENT HEALTH QUESTIONNAIRE - PHQ9: SUM OF ALL RESPONSES TO PHQ QUESTIONS 1-9: 0

## 2022-05-01 DIAGNOSIS — F41.1 GENERALIZED ANXIETY DISORDER: ICD-10-CM

## 2022-05-02 RX ORDER — ESCITALOPRAM OXALATE 20 MG/1
TABLET ORAL
Qty: 30 TABLET | Refills: 0 | Status: SHIPPED | OUTPATIENT
Start: 2022-05-02 | End: 2022-06-02

## 2022-05-02 NOTE — TELEPHONE ENCOUNTER
Prescription approved per [facility] Refill Protocol.  (30 day supply).    Last visit: 3/30/22  (to f/u in 3-4 weeks).

## 2022-05-03 ENCOUNTER — VIRTUAL VISIT (OUTPATIENT)
Dept: FAMILY MEDICINE | Facility: CLINIC | Age: 57
End: 2022-05-03
Payer: COMMERCIAL

## 2022-05-03 DIAGNOSIS — M54.2 CHRONIC NECK PAIN: ICD-10-CM

## 2022-05-03 DIAGNOSIS — F33.1 MAJOR DEPRESSIVE DISORDER, RECURRENT EPISODE, MODERATE (H): Primary | ICD-10-CM

## 2022-05-03 DIAGNOSIS — M17.11 PRIMARY OSTEOARTHRITIS OF RIGHT KNEE: ICD-10-CM

## 2022-05-03 DIAGNOSIS — G89.29 CHRONIC NECK PAIN: ICD-10-CM

## 2022-05-03 PROCEDURE — 99214 OFFICE O/P EST MOD 30 MIN: CPT | Mod: GT | Performed by: NURSE PRACTITIONER

## 2022-05-03 RX ORDER — OXYCODONE HYDROCHLORIDE 5 MG/1
5 TABLET ORAL EVERY 4 HOURS PRN
COMMUNITY
Start: 2022-04-19 | End: 2022-07-18

## 2022-05-03 RX ORDER — TRAMADOL HYDROCHLORIDE 50 MG/1
50 TABLET ORAL 2 TIMES DAILY PRN
Qty: 60 TABLET | Refills: 0 | Status: SHIPPED | OUTPATIENT
Start: 2022-05-03 | End: 2022-05-06

## 2022-05-03 RX ORDER — POTASSIUM CHLORIDE 1500 MG/1
20 TABLET, EXTENDED RELEASE ORAL DAILY
COMMUNITY
Start: 2022-04-25 | End: 2022-07-18

## 2022-05-03 RX ORDER — BUPROPION HYDROCHLORIDE 150 MG/1
150 TABLET ORAL EVERY MORNING
Qty: 90 TABLET | Refills: 1 | Status: SHIPPED | OUTPATIENT
Start: 2022-05-03 | End: 2022-11-14

## 2022-05-03 ASSESSMENT — PATIENT HEALTH QUESTIONNAIRE - PHQ9
SUM OF ALL RESPONSES TO PHQ QUESTIONS 1-9: 0
10. IF YOU CHECKED OFF ANY PROBLEMS, HOW DIFFICULT HAVE THESE PROBLEMS MADE IT FOR YOU TO DO YOUR WORK, TAKE CARE OF THINGS AT HOME, OR GET ALONG WITH OTHER PEOPLE: NOT DIFFICULT AT ALL

## 2022-05-03 NOTE — PROGRESS NOTES
"Karlene is a 57 year old who is being evaluated via a billable video visit.      How would you like to obtain your AVS? MyChart  If the video visit is dropped, the invitation should be resent by: Text to cell phone: [phone]  Will anyone else be joining your video visit? No      Video Start Time: 1000    Assessment & Plan     Major depressive disorder, recurrent episode, moderate (H)  Doing well on wellbutrin 1 month, will continue and follow-up in 6 months  - buPROPion (WELLBUTRIN XL) 150 MG 24 hr tablet; Take 1 tablet (150 mg) by mouth every morning  - traMADol (ULTRAM) 50 MG tablet; Take 1 tablet (50 mg) by mouth 2 times daily as needed for severe pain    Primary osteoarthritis of right knee  Short term tramadol until she has surgery then will use the oxy provided by surgeon short term then reassess    Chronic neck pain  Short term tramadol               BMI:   Estimated body mass index is 30.28 kg/m  as calculated from the following:    Height as of 12/29/21: 1.67 m (5' 5.75\").    Weight as of 3/30/22: 84.5 kg (186 lb 3.2 oz).           No follow-ups on file.    Loraine Buchanan NP  [facility]    Subjective   Karlene is a 57 year old who presents for the following health issues: requesting refills on Tramadol and Wellbutrin, she is scheduled for surgery 5/16/22 and would like Tramadol to use until then, follow up on Wellbutrin, she has been taking x1 month and would like to continue, wants Tramadol sent to [facility]    History of Present Illness       Mental Health Follow-up:  Patient presents to follow-up on Depression.Patient's depression since last visit has been:  Better  The patient is not having other symptoms associated with depression.      Any significant life events: No  Patient is not feeling anxious or having panic attacks.  Patient has no concerns about alcohol or drug use.       Today's PHQ-9         PHQ-9 Total Score: 0  PHQ-9 Q9 Thoughts of better off " dead/self-harm past 2 weeks :   (P) Not at all    How difficult have these problems made it for you to do your work, take care of things at home, or get along with other people: Not difficult at all        Reason for visit:  Med refill. Both Tramadol and Wellbutrin  Symptom onset:  More than a month  Symptom intensity:  Moderate  Symptom progression:  Worsening  Had these symptoms before:  Yes  Has tried/received treatment for these symptoms:  Yes  Previous treatment was successful:  No    She eats 2-3 servings of fruits and vegetables daily.She consumes 4 sweetened beverage(s) daily.She exercises with enough effort to increase her heart rate 9 or less minutes per day.  She exercises with enough effort to increase her heart rate 3 or less days per week.   She is taking medications regularly.             Review of Systems         Objective           Vitals:  No vitals were obtained today due to virtual visit.    Physical Exam   GENERAL: Healthy, alert and no distress  EYES: Eyes grossly normal to inspection.  No discharge or erythema, or obvious scleral/conjunctival abnormalities.  RESP: No audible wheeze, cough, or visible cyanosis.  No visible retractions or increased work of breathing.    SKIN: Visible skin clear. No significant rash, abnormal pigmentation or lesions.  NEURO: Cranial nerves grossly intact.  Mentation and speech appropriate for age.  PSYCH: Mentation appears normal, affect normal/bright, judgement and insight intact, normal speech and appearance well-groomed.    [place] Prescription Drug Monitoring Program checked and reviewed.  He continues to follow with psychiatry for use of stimulants for attention and this has been beneficial with weight loss as well.  When I asked about the narcotic prescription she told me this is her surgeon that has prescribed this for her after surgery.  She has a knee replacement scheduled in 2 weeks              Video-Visit Details    Type of service:  Video  Visit    Video End Time:1020    Originating Location (pt. Location): Home    Distant Location (provider location):  [facility]     Platform used for Video Visit: Jenelle

## 2022-06-02 DIAGNOSIS — F41.1 GENERALIZED ANXIETY DISORDER: ICD-10-CM

## 2022-06-02 RX ORDER — ESCITALOPRAM OXALATE 20 MG/1
TABLET ORAL
Qty: 30 TABLET | Refills: 0 | Status: SHIPPED | OUTPATIENT
Start: 2022-06-02 | End: 2022-07-06

## 2022-06-02 NOTE — TELEPHONE ENCOUNTER
TC- please contact patient. 30 day supply of medication sent to pharmacy. Patient is due for follow up visit from 3/30/22.    5/2/22 ( 30 day supply sent to pharmacy with follow up note.  6/2/22 ( 30 day supply sent to pharmacy).    Patient will need to be seen for future refills.

## 2022-06-27 ENCOUNTER — VIRTUAL VISIT (OUTPATIENT)
Dept: FAMILY MEDICINE | Facility: CLINIC | Age: 57
End: 2022-06-27
Payer: COMMERCIAL

## 2022-06-27 DIAGNOSIS — G89.29 CHRONIC BACK PAIN, UNSPECIFIED BACK LOCATION, UNSPECIFIED BACK PAIN LATERALITY: ICD-10-CM

## 2022-06-27 DIAGNOSIS — M54.9 CHRONIC BACK PAIN, UNSPECIFIED BACK LOCATION, UNSPECIFIED BACK PAIN LATERALITY: ICD-10-CM

## 2022-06-27 DIAGNOSIS — I10 PRIMARY HYPERTENSION: ICD-10-CM

## 2022-06-27 DIAGNOSIS — M54.2 CHRONIC NECK PAIN: ICD-10-CM

## 2022-06-27 DIAGNOSIS — E87.6 HYPOKALEMIA: Primary | ICD-10-CM

## 2022-06-27 DIAGNOSIS — G89.29 CHRONIC NECK PAIN: ICD-10-CM

## 2022-06-27 PROBLEM — E66.9 OBESITY: Status: ACTIVE | Noted: 2019-11-01

## 2022-06-27 PROCEDURE — 99214 OFFICE O/P EST MOD 30 MIN: CPT | Mod: GT | Performed by: NURSE PRACTITIONER

## 2022-06-27 RX ORDER — TRAMADOL HYDROCHLORIDE 50 MG/1
50 TABLET ORAL 2 TIMES DAILY PRN
Qty: 60 TABLET | Refills: 0 | Status: SHIPPED | OUTPATIENT
Start: 2022-06-27 | End: 2022-07-18

## 2022-06-27 RX ORDER — TRAMADOL HYDROCHLORIDE 50 MG/1
50 TABLET ORAL 2 TIMES DAILY PRN
COMMUNITY
Start: 2022-06-13 | End: 2022-07-18

## 2022-06-27 RX ORDER — LISINOPRIL 10 MG/1
10 TABLET ORAL DAILY
Qty: 30 TABLET | Refills: 1 | Status: SHIPPED | OUTPATIENT
Start: 2022-06-27 | End: 2022-07-18

## 2022-06-27 NOTE — PROGRESS NOTES
"Karlene is a 57 year old who is being evaluated via a billable video visit.      How would you like to obtain your AVS? MyChart  If the video visit is dropped, the invitation should be resent by: Text to cell phone: [phone]  Will anyone else be joining your video visit? No          Assessment & Plan     Hypokalemia  Stop hydrochlorothiazide and start lisinopril, f/u 2 weeks in clinic  - lisinopril (ZESTRIL) 10 MG tablet; Take 1 tablet (10 mg) by mouth daily    Primary hypertension  As above  - lisinopril (ZESTRIL) 10 MG tablet; Take 1 tablet (10 mg) by mouth daily    Chronic back pain, unspecified back location, unspecified back pain laterality  Will return to tramadol bid prn neck/back pain/right hip pain. Ice bid. Tylenol supplementation, no NSAIDS  - traMADol (ULTRAM) 50 MG tablet; Take 1 tablet (50 mg) by mouth 2 times daily as needed for severe pain    Chronic neck pain    - traMADol (ULTRAM) 50 MG tablet; Take 1 tablet (50 mg) by mouth 2 times daily as needed for severe pain       BMI:   Estimated body mass index is 30.28 kg/m  as calculated from the following:    Height as of 12/29/21: 1.67 m (5' 5.75\").    Weight as of 3/30/22: 84.5 kg (186 lb 3.2 oz).       Return in about 2 weeks (around 7/11/2022) for Follow up, with me.    Loraine Buchanan NP  [facility]    Subjective   Karlene is a 57 year old, presenting for the following health issues: wanting to get a medication refill on Tramadol or start back on Butrans due to backpain/osteoarthritis    Had left knee replacement 5/9 in [facility], notes reviewed. Had to correct potassium first, she has sued hydrochlorothiazide for HTN for years. BP now in the 130 systolic, will change to lisinopril and stop hydrochlorothiazide and see in clinic in 2 weeks and check labs and bp    Left knee replaced, initially great pain but much better now. Still with chronic right knee and neck/back pain. Requests tramadol continuation, no longer on " oxycodone., Hx of GI bleed, cannot use NSAIDs, uses some tylenol  pdmp checked, no concerns given surgeon rxs   Medication Request and Arthritis      History of Present Illness       Back Pain:  She presents for follow up of back pain. Patient's back pain is a chronic problem.  Location of back pain:  Right lower back, left lower back and other  Description of back pain: gnawing  Back pain spreads: nowhere    Since patient first noticed back pain, pain is: always present, but gets better and worse  Does back pain interfere with her job:  No      Reason for visit:  In person medication refill    She eats 2-3 servings of fruits and vegetables daily.She consumes 4 sweetened beverage(s) daily.She exercises with enough effort to increase her heart rate 10 to 19 minutes per day.  She exercises with enough effort to increase her heart rate 3 or less days per week.   She is taking medications regularly.             Review of Systems         Objective           Vitals:  No vitals were obtained today due to virtual visit.    Physical Exam   GENERAL: Healthy, alert and no distress  EYES: Eyes grossly normal to inspection.  No discharge or erythema, or obvious scleral/conjunctival abnormalities.  RESP: No audible wheeze, cough, or visible cyanosis.  No visible retractions or increased work of breathing.    SKIN: Visible skin clear. No significant rash, abnormal pigmentation or lesions.  NEURO: Cranial nerves grossly intact.  Mentation and speech appropriate for age.  PSYCH: Mentation appears normal, affect normal/bright, judgement and insight intact, normal speech and appearance well-groomed.                Video-Visit Details    Video Start Time: 540    Type of service:  Video Visit    Video End Time:602    Originating Location (pt. Location): Home    Distant Location (provider location):  [facility]     Platform used for Video Visit: LANDBAY    Ryan Lawrence

## 2022-07-05 ENCOUNTER — MYC REFILL (OUTPATIENT)
Dept: FAMILY MEDICINE | Facility: CLINIC | Age: 57
End: 2022-07-05

## 2022-07-05 DIAGNOSIS — F41.1 GENERALIZED ANXIETY DISORDER: ICD-10-CM

## 2022-07-05 RX ORDER — ESCITALOPRAM OXALATE 20 MG/1
TABLET ORAL
Qty: 30 TABLET | Refills: 0 | Status: CANCELLED | OUTPATIENT
Start: 2022-07-05

## 2022-07-06 DIAGNOSIS — F41.1 GENERALIZED ANXIETY DISORDER: ICD-10-CM

## 2022-07-06 RX ORDER — ESCITALOPRAM OXALATE 20 MG/1
TABLET ORAL
Qty: 30 TABLET | Refills: 2 | Status: SHIPPED | OUTPATIENT
Start: 2022-07-06 | End: 2022-07-18

## 2022-07-06 RX ORDER — ESCITALOPRAM OXALATE 20 MG/1
TABLET ORAL
Qty: 30 TABLET | Refills: 0 | OUTPATIENT
Start: 2022-07-06

## 2022-07-06 NOTE — TELEPHONE ENCOUNTER
"Routing refill request to provider for review/approval because:  Drug interaction warning    Patient recentlyu started on Tramadol - please advise of refill of medication.   Patient has been out of meds and is requesting today.     Last Written Prescription Date:  6/2/2022  Last Fill Quantity: 30,  # refills: 0   Last office visit provider:  6/27/2022     Requested Prescriptions   Pending Prescriptions Disp Refills     escitalopram (LEXAPRO) 20 MG tablet 30 tablet 2     Sig: TAKE ONE TABLET BY MOUTH ONCE EVERY DAY       SSRIs Protocol Passed - 7/6/2022  3:11 PM        Passed - Recent (12 mo) or future (30 days) visit within the authorizing provider's specialty     Patient has had an office visit with the authorizing provider or a provider within the authorizing providers department within the previous 12 mos or has a future within next 30 days. See \"Patient Info\" tab in inbasket, or \"Choose Columns\" in Meds & Orders section of the refill encounter.              Passed - Medication is active on med list        Passed - Patient is age 18 or older        Passed - No active pregnancy on record        Passed - No positive pregnancy test in last 12 months             Xenia Soto RN 07/06/22 3:12 PM  "

## 2022-07-07 RX ORDER — ESCITALOPRAM OXALATE 20 MG/1
TABLET ORAL
Qty: 30 TABLET | Refills: 0 | OUTPATIENT
Start: 2022-07-07

## 2022-07-18 ENCOUNTER — OFFICE VISIT (OUTPATIENT)
Dept: FAMILY MEDICINE | Facility: CLINIC | Age: 57
End: 2022-07-18
Payer: COMMERCIAL

## 2022-07-18 VITALS
TEMPERATURE: 98.6 F | HEART RATE: 72 BPM | SYSTOLIC BLOOD PRESSURE: 146 MMHG | WEIGHT: 195.5 LBS | BODY MASS INDEX: 31.79 KG/M2 | OXYGEN SATURATION: 97 % | DIASTOLIC BLOOD PRESSURE: 90 MMHG

## 2022-07-18 DIAGNOSIS — G89.29 CHRONIC BACK PAIN, UNSPECIFIED BACK LOCATION, UNSPECIFIED BACK PAIN LATERALITY: Primary | ICD-10-CM

## 2022-07-18 DIAGNOSIS — I10 PRIMARY HYPERTENSION: ICD-10-CM

## 2022-07-18 DIAGNOSIS — G89.29 CHRONIC NECK PAIN: ICD-10-CM

## 2022-07-18 DIAGNOSIS — M54.9 CHRONIC BACK PAIN, UNSPECIFIED BACK LOCATION, UNSPECIFIED BACK PAIN LATERALITY: Primary | ICD-10-CM

## 2022-07-18 DIAGNOSIS — F41.1 GENERALIZED ANXIETY DISORDER: ICD-10-CM

## 2022-07-18 DIAGNOSIS — M54.2 CHRONIC NECK PAIN: ICD-10-CM

## 2022-07-18 PROBLEM — M25.561 PAIN IN RIGHT KNEE: Status: RESOLVED | Noted: 2019-02-04 | Resolved: 2022-07-18

## 2022-07-18 PROCEDURE — 99214 OFFICE O/P EST MOD 30 MIN: CPT | Performed by: NURSE PRACTITIONER

## 2022-07-18 RX ORDER — TRAMADOL HYDROCHLORIDE 50 MG/1
50 TABLET ORAL 2 TIMES DAILY PRN
Qty: 60 TABLET | Refills: 0 | Status: SHIPPED | OUTPATIENT
Start: 2022-07-18 | End: 2022-08-25

## 2022-07-18 RX ORDER — AMLODIPINE BESYLATE 2.5 MG/1
2.5 TABLET ORAL DAILY
Qty: 90 TABLET | Refills: 0 | Status: SHIPPED | OUTPATIENT
Start: 2022-07-18 | End: 2022-10-11

## 2022-07-18 RX ORDER — ESCITALOPRAM OXALATE 20 MG/1
TABLET ORAL
Qty: 90 TABLET | Refills: 1 | Status: SHIPPED | OUTPATIENT
Start: 2022-07-18 | End: 2022-11-14

## 2022-07-18 NOTE — PROGRESS NOTES
"  Assessment & Plan     (I10) Primary hypertension  (primary encounter diagnosis)  Comment:   Plan: amLODIPine (NORVASC) 2.5 MG tablet        Will add norvasc and reassess, she can take bp at home    (F41.1) Generalized anxiety disorder  Comment: refill sent, doing well  Plan: escitalopram (LEXAPRO) 20 MG tablet            (M54.9,  G89.29) Chronic back pain, unspecified back location, unspecified back pain laterality  Comment: see CSA  Plan: traMADol (ULTRAM) 50 MG tablet        CSA reviewed and singed with urine drug screen today    (M54.2,  G89.29) Chronic neck pain  Comment: controlled  Plan: traMADol (ULTRAM) 50 MG tablet                       BMI:   Estimated body mass index is 31.79 kg/m  as calculated from the following:    Height as of 12/29/21: 1.67 m (5' 5.75\").    Weight as of this encounter: 88.7 kg (195 lb 8 oz).           No follow-ups on file.    Loraine Buchanan NP  [facility]    Cari Soler is a 57 year old presenting for the following health issues: here in f/u on BP, was prescribed Lisinopril, she took for a couple of weeks and then stopped about 4-5 days ago because she was coughing all the time, discuss Tramadol   Hypertension      History of Present Illness       Back Pain:  She presents for follow up of back pain. Patient's back pain is a chronic problem.  Location of back pain:  Right lower back and left lower back  Description of back pain: dull ache  Back pain spreads: right side of neck and left side of neck    Since patient first noticed back pain, pain is: unchanged  Does back pain interfere with her job:  No      Hypertension: She presents for follow up of hypertension.  She does check blood pressure  regularly outside of the clinic. Outside blood pressures have been over 140/90. She follows a low salt diet.               Review of Systems         Objective    BP (!) 146/90 (BP Location: Right arm, Patient Position: Sitting)   Pulse 72   Temp 98.6  F " (37  C)   Wt 88.7 kg (195 lb 8 oz)   LMP 08/06/2012   SpO2 97%   BMI 31.79 kg/m    Body mass index is 31.79 kg/m .  Physical Exam   Ao x3 NAD                    .  ..

## 2022-07-18 NOTE — LETTER
[facility] [place]  07/18/22  Patient: Angelique Rojas  YOB: 1965  Medical Record Number: [id number]                                                                                  Non-Opioid Controlled Substance Agreement    This is an agreement between you and your provider regarding safe and appropriate use of controlled substances prescribed by your care team. Controlled substances are?medicines that can cause physical and mental dependence (abuse).     There are strict laws about having and using these medicines. We here at [facility] are  committed to working with you in your efforts to get better. To support you in this work, we'll help you schedule regular office appointments for medicine refills. If we must cancel or change your appointment for any reason, we'll make sure you have enough medicine to last until your next appointment.     As a Provider, I will:     Listen carefully to your concerns while treating you with respect.     Recommend a treatment plan that I believe is in your best interest and may involve therapies other than medicine.      Talk with you often about the possible benefits and the risk of harm of any medicine that we prescribe for you.    Assess the safety of this medicine and check how well it works.      Provide a plan on how to taper (discontinue or go off) using this medicine if the decision is made to stop its use.      ::  As a Patient, I understand controlled substances:       Are prescribed by my care provider to help me function or work and manage my condition(s).?    Are strong medicines and can cause serious side effects.       Need to be taken exactly as prescribed.?Combining controlled substances with certain medicines or chemicals (such as illegal drugs, alcohol, sedatives, sleeping pills, and benzodiazepines) can be dangerous or even fatal.? If I stop taking my medicines suddenly, I may have severe withdrawal symptoms.      The risks, benefits, and side effects of these medicine(s) were explained to me. I agree that:    1. I will take part in other treatments as advised by my care team. This may be psychiatry or counseling, physical therapy, behavioral therapy, group treatment or a referral to specialist.    2. I will keep all my appointments and understand this is part of the monitoring of controlled substances.?My care team may require an office visit for EVERY controlled substance refill. If I miss appointments or don t follow instructions, my care team may stop my medicine    3. I will take my medicines as prescribed. I will not change the dose or schedule unless my care team tells me to. There will be no refills if I run out early.      4. I may be asked to come to the clinic and complete a urine drug test or complete a pill count. If I don t give a urine sample or participate in a pill count, the care team may stop my medicine.    5. I will only receive controlled substance prescriptions from this clinic. If I am treated by another provider, I will tell them that I am taking controlled substances and that I have a treatment agreement with this provider. I will inform my [facility] care team within one business day if I am given a prescription for any controlled substance by another healthcare provider. My [facility] care team can contact other providers and pharmacists about my use of any medicines.    6. It is up to me to make sure that I don't run out of my medicines on weekends or holidays.?If my care team is willing to refill my prescription without a visit, I must request refills only during office hours. Refills may take up to 3 business days to process. I will use one pharmacy to fill all my controlled substance prescriptions. I will notify the clinic about any changes to my insurance or medicine availability.    7. I am responsible for my prescriptions. If the medicine/prescription is lost, stolen or  destroyed, it will not be replaced.?I also agree not to share controlled substance medicines with anyone.     8. I am aware I should not use any illegal or recreational drugs. I agree not to drink alcohol unless my care team says I can.     9. If I enroll in the [place] Medical Cannabis program, I will tell my care team before my next refill.    10. I will tell my care team right away if I become pregnant, have a new medical problem treated outside of my regular clinic, or have a change in my medicines.     11. I understand that this medicine can affect my thinking, judgment and reaction time.? Alcohol and drugs affect the brain and body, which can affect the safety of my driving. Being under the influence of alcohol or drugs can affect my decision-making, behaviors, personal safety and the safety of others. Driving while impaired (DWI) can occur if a person is driving, operating or in physical control of a car, motorcycle, boat, snowmobile, ATV, motorbike, off-road vehicle or any other motor vehicle ([place] Statute 169A.20). I understand the risk if I choose to drive or operate any vehicle or machinery.    I understand that if I do not follow any of the conditions above, my prescriptions or treatment may be stopped or changed.   I agree that my provider, clinic care team and pharmacy may work with any city, state or federal law enforcement agency that investigates the misuse, sale or other diversion of my controlled medicine. I will allow my provider to discuss my care with, or share a copy of, this agreement with any other treating provider, pharmacy or emergency room where I receive care.     I have read this agreement and have asked questions about anything I did not understand.    ________________________________________________________  Patient Signature - Angelique Rojas     ___________________                   Date     ________________________________________________________  Provider Signature - Loraine  Buchanan, NP       ___________________                   Date     ________________________________________________________  Witness Signature (required if provider not present while patient signing)          ___________________                   Date

## 2022-07-20 LAB
AMPHETAMINES UR QL: DETECTED
BARBITURATES UR QL SCN: NOT DETECTED
BENZODIAZ UR QL SCN: NOT DETECTED
BUPRENORPHINE UR QL: NOT DETECTED
CANNABINOIDS UR QL: NOT DETECTED
COCAINE UR QL SCN: NOT DETECTED
D-METHAMPHET UR QL: NOT DETECTED
METHADONE UR QL SCN: NOT DETECTED
OPIATES UR QL SCN: NOT DETECTED
OXYCODONE UR QL SCN: NOT DETECTED
PCP UR QL SCN: NOT DETECTED
PROPOXYPH UR QL: NOT DETECTED
TRICYCLICS UR QL SCN: NOT DETECTED

## 2022-07-20 PROCEDURE — 80306 DRUG TEST PRSMV INSTRMNT: CPT | Performed by: NURSE PRACTITIONER

## 2022-07-24 DIAGNOSIS — G43.901 MIGRAINE WITH STATUS MIGRAINOSUS, NOT INTRACTABLE, UNSPECIFIED MIGRAINE TYPE: ICD-10-CM

## 2022-07-26 RX ORDER — ZOLMITRIPTAN 5 MG/1
TABLET, ORALLY DISINTEGRATING ORAL
Qty: 12 TABLET | Refills: 0 | Status: SHIPPED | OUTPATIENT
Start: 2022-07-26 | End: 2022-12-23

## 2022-07-26 NOTE — TELEPHONE ENCOUNTER
Routing refill request to provider for review/approval because:  Drug does not pass the [facility] refill protocol       Serotonin Agonists Failed     Blood pressure under 140/90 in past 12 months    Serotonin Agonist request needs review.        BP Readings from Last 3 Encounters:   07/18/22 (!) 146/90   03/30/22 120/88   12/29/21 118/68      PRASHANTH Melissa  [facility]

## 2022-09-27 ENCOUNTER — E-VISIT (OUTPATIENT)
Dept: FAMILY MEDICINE | Facility: CLINIC | Age: 57
End: 2022-09-27
Payer: COMMERCIAL

## 2022-09-27 DIAGNOSIS — N39.0 ACUTE UTI (URINARY TRACT INFECTION): Primary | ICD-10-CM

## 2022-09-27 PROCEDURE — 99421 OL DIG E/M SVC 5-10 MIN: CPT | Performed by: PHYSICIAN ASSISTANT

## 2022-09-27 RX ORDER — NITROFURANTOIN 25; 75 MG/1; MG/1
100 CAPSULE ORAL 2 TIMES DAILY
Qty: 10 CAPSULE | Refills: 0 | Status: SHIPPED | OUTPATIENT
Start: 2022-09-27 | End: 2022-10-02

## 2022-09-27 NOTE — PATIENT INSTRUCTIONS
Dear Angelique Rojas    After reviewing your responses, I've been able to diagnose you with a urinary tract infection, which is a common infection of the bladder with bacteria.  This is not a sexually transmitted infection, though urinating immediately after intercourse can help prevent infections.  Drinking lots of fluids is also helpful to clear your current infection and prevent the next one.      I have sent a prescription for antibiotics to your pharmacy to treat this infection.    It is important that you take all of your prescribed medication even if your symptoms are improving after a few doses.  Taking all of your medicine helps prevent the symptoms from returning.     If your symptoms worsen, you develop pain in your back or stomach, develop fevers, or are not improving in 5 days, please contact your primary care provider for an appointment or visit any of our convenient Walk-in or Urgent Care Centers to be seen, which can be found on our website here.    Thanks again for choosing us as your health care partner,    Saira Martinez PA-C    Urinary Tract Infections in Women  Urinary tract infections (UTIs) are most often caused by bacteria. These bacteria enter the urinary tract. The bacteria may come from inside the body. Or they may travel from the skin outside the rectum or vagina into the urethra. Female anatomy makes it easy for bacteria from the bowel to enter a woman s urinary tract, which is the most common source of UTI. This means women develop UTIs more often than men. Pain in or around the urinary tract is a common UTI symptom. But the only way to know for sure if you have a UTI for the healthcare provider to test your urine. The two tests that may be done are the urinalysis and urine culture.     Types of UTIs    Cystitis. A bladder infection (cystitis) is the most common UTI in women. You may have urgent or frequent need to pee. You may also have pain, burning when you pee, and bloody  urine.    Urethritis. This is an inflamed urethra, which is the tube that carries urine from the bladder to outside the body. You may have lower stomach or back pain. You may also have urgent or frequent need to pee.    Pyelonephritis. This is a kidney infection. If not treated, it can be serious and damage your kidneys. In severe cases, you may need to stay in the hospital. You may have a fever and lower back pain.    Medicines to treat a UTI  Most UTIs are treated with antibiotics. These kill the bacteria. The length of time you need to take them depends on the type of infection. It may be as short as 3 days. If you have repeated UTIs, you may need a low-dose antibiotic for several months. Take antibiotics exactly as directed. Don t stop taking them until all of the medicine is gone. If you stop taking the antibiotic too soon, the infection may not go away. You may also develop a resistance to the antibiotic. This can make it much harder to treat.   Lifestyle changes to treat and prevent UTIs   The lifestyle changes below will help get rid of your UTI. They may also help prevent future UTIs.     Drink plenty of fluids. This includes water, juice, or other caffeine-free drinks. Fluids help flush bacteria out of your body.    Empty your bladder. Always empty your bladder when you feel the urge to pee. And always pee before going to sleep. Urine that stays in your bladder can lead to infection. Try to pee before and after sex as well.    Practice good personal hygiene. Wipe yourself from front to back after using the toilet. This helps keep bacteria from getting into the urethra.    Use condoms during sex. These help prevent UTIs caused by sexually transmitted bacteria. Also don't use spermicides during sex. These can increase the risk for UTIs. Choose other forms of birth control instead. For women who tend to get UTIs after sex, a low-dose of a preventive antibiotic may be used. Be sure to discuss this option with  your healthcare provider.    Follow up with your healthcare provider as directed. He or she may test to make sure the infection has cleared. If needed, more treatment may be started.  Berta last reviewed this educational content on 7/1/2019 2000-2021 [employer]. All rights reserved. This information is not intended as a substitute for professional medical care. Always follow your healthcare professional's instructions.

## 2022-10-06 ENCOUNTER — OFFICE VISIT (OUTPATIENT)
Dept: FAMILY MEDICINE | Facility: CLINIC | Age: 57
End: 2022-10-06
Payer: COMMERCIAL

## 2022-10-06 VITALS
BODY MASS INDEX: 32.53 KG/M2 | SYSTOLIC BLOOD PRESSURE: 149 MMHG | OXYGEN SATURATION: 100 % | TEMPERATURE: 98.3 F | RESPIRATION RATE: 16 BRPM | DIASTOLIC BLOOD PRESSURE: 88 MMHG | WEIGHT: 200 LBS | HEART RATE: 75 BPM

## 2022-10-06 DIAGNOSIS — I10 HYPERTENSION, UNSPECIFIED TYPE: ICD-10-CM

## 2022-10-06 DIAGNOSIS — M54.9 ACUTE RIGHT-SIDED BACK PAIN, UNSPECIFIED BACK LOCATION: Primary | ICD-10-CM

## 2022-10-06 DIAGNOSIS — R60.0 EDEMA OF LOWER EXTREMITY: ICD-10-CM

## 2022-10-06 LAB
ALBUMIN UR-MCNC: NEGATIVE MG/DL
ANION GAP SERPL CALCULATED.3IONS-SCNC: 15 MMOL/L (ref 7–15)
APPEARANCE UR: CLEAR
BACTERIA #/AREA URNS HPF: ABNORMAL /HPF
BILIRUB UR QL STRIP: NEGATIVE
BUN SERPL-MCNC: 8 MG/DL (ref 6–20)
CALCIUM SERPL-MCNC: 9.1 MG/DL (ref 8.6–10)
CHLORIDE SERPL-SCNC: 103 MMOL/L (ref 98–107)
COLOR UR AUTO: YELLOW
CREAT SERPL-MCNC: 0.89 MG/DL (ref 0.51–0.95)
DEPRECATED HCO3 PLAS-SCNC: 23 MMOL/L (ref 22–29)
GFR SERPL CREATININE-BSD FRML MDRD: 76 ML/MIN/1.73M2
GLUCOSE SERPL-MCNC: 87 MG/DL (ref 70–99)
GLUCOSE UR STRIP-MCNC: NEGATIVE MG/DL
HGB UR QL STRIP: NEGATIVE
KETONES UR STRIP-MCNC: NEGATIVE MG/DL
LEUKOCYTE ESTERASE UR QL STRIP: ABNORMAL
NITRATE UR QL: NEGATIVE
PH UR STRIP: 7 [PH] (ref 5–8)
POTASSIUM SERPL-SCNC: 3.6 MMOL/L (ref 3.4–5.3)
RBC #/AREA URNS AUTO: ABNORMAL /HPF
SODIUM SERPL-SCNC: 141 MMOL/L (ref 136–145)
SP GR UR STRIP: 1.01 (ref 1–1.03)
SQUAMOUS #/AREA URNS AUTO: ABNORMAL /LPF
UROBILINOGEN UR STRIP-ACNC: 0.2 E.U./DL
WBC #/AREA URNS AUTO: ABNORMAL /HPF

## 2022-10-06 PROCEDURE — 81001 URINALYSIS AUTO W/SCOPE: CPT | Performed by: FAMILY MEDICINE

## 2022-10-06 PROCEDURE — 36415 COLL VENOUS BLD VENIPUNCTURE: CPT | Performed by: FAMILY MEDICINE

## 2022-10-06 PROCEDURE — 99214 OFFICE O/P EST MOD 30 MIN: CPT | Performed by: FAMILY MEDICINE

## 2022-10-06 PROCEDURE — 80048 BASIC METABOLIC PNL TOTAL CA: CPT | Performed by: FAMILY MEDICINE

## 2022-10-06 NOTE — PATIENT INSTRUCTIONS
Your urine test was normal.  I would not recommend an additional antibiotic or CT scan at this time.    We will let you know the results of your basic metabolic panel when we get it back.  I would recommend following up with your primary clinic to adjust your antihypertensive medication in the next week or 2.  In the meantime I would recommend continuing to check your blood pressure at home.  Please follow-up with your PCP if your back pain is continuing.

## 2022-10-06 NOTE — PROGRESS NOTES
Assessment:       Acute right-sided back pain, unspecified back location  - UA macro with reflex to Microscopic and Culture - Clinc Collect  - Urine Microscopic    Edema of lower extremity  - Basic metabolic panel  (Ca, Cl, CO2, Creat, Gluc, K, Na, BUN)  - Basic metabolic panel  (Ca, Cl, CO2, Creat, Gluc, K, Na, BUN)    Hypertension, unspecified type, uncontrolled     Plan:     Patient with uncontrolled hypertension and 1+ lower extremity edema.  Recommend making a follow-up appointment in the next 2 weeks with her PCP for follow-up of her hypertension.  Have her increase her amlodipine for now to 5 mg daily.  We will check her blood pressure regularly over the next couple weeks.  BMP obtained and we will notify her the results of this when we get it back.    Patient with acute right-sided back discomfort off and on for the past week or so.  UA is essentially unremarkable without signs or symptoms of UTI.  There is no blood in her urine at all so this patient for ureteral stone is low.  Recommend continue to monitor symptoms and follow-up with PCP if symptoms getting worse or not improving.  Patient is agreeable with this plan.    MEDICATIONS:   No orders of the defined types were placed in this encounter.      Patient Instructions   Your urine test was normal.  I would not recommend an additional antibiotic or CT scan at this time.    We will let you know the results of your basic metabolic panel when we get it back.  I would recommend following up with your primary clinic to adjust your antihypertensive medication in the next week or 2.  In the meantime I would recommend continuing to check your blood pressure at home.  Please follow-up with your PCP if your back pain is continuing.        Subjective:       57 year old female presents for evaluation of right-sided back pain.  She did a virtual visit about a week ago and was treated for a UTI with Macrobid.  She was having some urgency at the time but no dysuria.   Her back pain started around the same time.  Her symptoms have not really improved and she is now here today for follow-up.  She denies any burning with urination or increased urinary frequency or urgency.  There is no blood in her urine.  Back pain is not worse with movement.  It tends to come and go.    His blood pressure is also noted to be quite elevated recently.  She is on amlodipine 2.5 mg daily.  This is a new change for her over the past couple of months.  Over the past day or so she has had some lower extremity edema bilaterally.  Denies chest pain or shortness of breath.    Patient Active Problem List   Diagnosis     Generalized anxiety disorder     Allergic rhinitis due to other allergen     CARDIOVASCULAR SCREENING; LDL GOAL LESS THAN 160     Chronic neck pain     Chronic back pain     Migraine without aura and without status migrainosus, not intractable     Transient insomnia     Binge eating     Gastrointestinal hemorrhage     Migraine headache     Osteoarthritis of knee     Primary hypertension     Major depressive disorder, recurrent episode, moderate (H)     Hypokalemia     Obesity     Migraine without aura and responsive to treatment       Past Medical History:   Diagnosis Date     Acne      Allergic rhinitis due to other allergen     year round     Calculus kidney 2008    3mm RT distal UVJ     Chronic neck pain      Excessive or frequent menstruation      Generalized anxiety disorder      Irritable bowel syndrome     diarrhea     Migraine headache        Past Surgical History:   Procedure Laterality Date     HC ENLARGE BREAST WITH IMPLANT  2007    silicone     LASIK       SURGICAL HISTORY OF -   2003    AVM malformation removed- right knee     SURGICAL HISTORY OF -   2005    excision lipoma from back       Current Outpatient Medications   Medication     amLODIPine (NORVASC) 2.5 MG tablet     buPROPion (WELLBUTRIN XL) 150 MG 24 hr tablet     escitalopram (LEXAPRO) 20 MG tablet     MULTIVITAMIN  TABS   OR     omeprazole (PRILOSEC) 20 MG DR capsule     traMADol (ULTRAM) 50 MG tablet     VITAMIN D 1000 UNIT OR TABS     VYVANSE 50 MG capsule     ZOLMitriptan (ZOMIG-ZMT) 5 MG ODT     No current facility-administered medications for this visit.       Allergies   Allergen Reactions     Lisinopril Cough     Nkda [No Known Drug Allergies]        Family History   Problem Relation Age of Onset     Hypertension Mother      Lipids Mother      Breast Cancer Mother         at age 66     Hypertension Maternal Grandfather      Alzheimer Disease Maternal Grandfather      Lipids Maternal Grandfather      Breast Cancer Paternal Grandmother         at age 70 primary site lung ca       Social History     Socioeconomic History     Marital status:      Spouse name: None     Number of children: 2     Years of education: 14 years     Highest education level: None   Occupational History     Occupation: informatics     Comment: [employer]      Employer: [employer]   Tobacco Use     Smoking status: Former Smoker     Packs/day: 1.00     Years: 4.00     Pack years: 4.00     Types: Cigarettes     Quit date: 3/14/1991     Years since quittin.5     Smokeless tobacco: Never Used   Vaping Use     Vaping Use: Never used   Substance and Sexual Activity     Alcohol use: Not Currently     Alcohol/week: 0.0 standard drinks     Drug use: No     Sexual activity: Yes     Partners: Male     Birth control/protection: Surgical     Comment: partner has a vascetomy   Other Topics Concern      Service No     Blood Transfusions No     Caffeine Concern No     Comment: 1-2 soda per day     Occupational Exposure Yes     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      Objective:                     General Appearance:    BP (!) 149/88   Pulse 75   Temp 98.3  F (36.8  C) (Oral)   Resp 16   Wt 90.7 kg (200 lb)   LMP 08/06/2012   SpO2 100%   BMI 32.53 kg/m          Alert, pleasant, cooperative, no distress, appears stated age   Head:    Normocephalic, without obvious abnormality, atraumatic   Eyes:    Conjunctiva/corneas clear   Ears:    Normal TM's without erythema or bulging. Normal external ear canals, both ears   Nose:   Nares normal, septum midline, mucosa normal, no drainage    or sinus tenderness   Throat:   Lips, mucosa, and tongue normal; teeth and gums normal.  No tonsilar hypertrophy or exudate.   Neck:   Supple, symmetrical, trachea midline, no adenopathy    Lungs:     Clear to auscultation bilaterally without wheezes, rales, or rhonchi, respirations unlabored    Heart:    Regular rate and rhythm, S1 and S2 normal, no murmur, rub or gallop                                  Back:  No CVA tenderness.  No vertebral tenderness.  No skin rash.  Abdomen: Soft, nontender, nondistended with positive bowel sounds.   Extremities:  1+ lower extremity edema noted.   Skin:   Skin color, texture, turgor normal, no rashes or lesions             Results for orders placed or performed in visit on 10/06/22   UA macro with reflex to Microscopic and Culture - Clinc Collect     Status: Abnormal    Specimen: Urine, Clean Catch   Result Value Ref Range    Color Urine Yellow Colorless, Straw, Light Yellow, Yellow    Appearance Urine Clear Clear    Glucose Urine Negative Negative mg/dL    Bilirubin Urine Negative Negative    Ketones Urine Negative Negative mg/dL    Specific Gravity Urine 1.010 1.005 - 1.030    Blood Urine Negative Negative    pH Urine 7.0 5.0 - 8.0    Protein Albumin Urine Negative Negative mg/dL    Urobilinogen  Urine 0.2 0.2, 1.0 E.U./dL    Nitrite Urine Negative Negative    Leukocyte Esterase Urine Small (A) Negative   Urine Microscopic     Status: Abnormal   Result Value Ref Range    Bacteria Urine Few (A) None Seen /HPF    RBC Urine None Seen 0-2 /HPF /HPF    WBC Urine 0-5 0-5 /HPF /HPF    Squamous Epithelials Urine Few (A) None Seen /LPF    Narrative    Urine Culture not indicated       This note has been dictated using voice recognition software. Any grammatical or context distortions are unintentional and inherent to the software

## 2022-10-10 DIAGNOSIS — I10 PRIMARY HYPERTENSION: ICD-10-CM

## 2022-10-11 RX ORDER — AMLODIPINE BESYLATE 5 MG/1
5 TABLET ORAL DAILY
Qty: 30 TABLET | Refills: 0 | Status: SHIPPED | OUTPATIENT
Start: 2022-10-11 | End: 2022-11-14

## 2022-10-11 NOTE — TELEPHONE ENCOUNTER
Routing refill request to provider for review/approval because:  LOV 10/06/2022    BP Readings from Last 3 Encounters:   10/06/22 (!) 149/88   07/18/22 (!) 146/90   03/30/22 120/88         PRASHANTH Melissa  [facility]

## 2022-10-11 NOTE — TELEPHONE ENCOUNTER
Her dose was recently increased to 5mg daily.I sent refill at 5 mg for 1 month only. Please call her and  ask her to see me or schedule a blood pressure check in the next 2 weeks so we can see if her blood pressure is in desired range thanks.

## 2022-11-14 ENCOUNTER — MYC MEDICAL ADVICE (OUTPATIENT)
Dept: FAMILY MEDICINE | Facility: CLINIC | Age: 57
End: 2022-11-14

## 2022-11-14 ENCOUNTER — OFFICE VISIT (OUTPATIENT)
Dept: FAMILY MEDICINE | Facility: CLINIC | Age: 57
End: 2022-11-14
Payer: COMMERCIAL

## 2022-11-14 VITALS
DIASTOLIC BLOOD PRESSURE: 77 MMHG | SYSTOLIC BLOOD PRESSURE: 121 MMHG | WEIGHT: 199 LBS | HEIGHT: 66 IN | OXYGEN SATURATION: 98 % | HEART RATE: 76 BPM | RESPIRATION RATE: 20 BRPM | BODY MASS INDEX: 31.98 KG/M2 | TEMPERATURE: 98.9 F

## 2022-11-14 DIAGNOSIS — F33.1 MAJOR DEPRESSIVE DISORDER, RECURRENT EPISODE, MODERATE (H): ICD-10-CM

## 2022-11-14 DIAGNOSIS — F98.8 ATTENTION DEFICIT DISORDER (ADD) WITHOUT HYPERACTIVITY: Primary | ICD-10-CM

## 2022-11-14 DIAGNOSIS — G89.29 CHRONIC NECK PAIN: ICD-10-CM

## 2022-11-14 DIAGNOSIS — M54.9 CHRONIC BACK PAIN, UNSPECIFIED BACK LOCATION, UNSPECIFIED BACK PAIN LATERALITY: ICD-10-CM

## 2022-11-14 DIAGNOSIS — G89.29 CHRONIC BACK PAIN, UNSPECIFIED BACK LOCATION, UNSPECIFIED BACK PAIN LATERALITY: ICD-10-CM

## 2022-11-14 DIAGNOSIS — I10 PRIMARY HYPERTENSION: ICD-10-CM

## 2022-11-14 DIAGNOSIS — M54.2 CHRONIC NECK PAIN: ICD-10-CM

## 2022-11-14 DIAGNOSIS — Z13.220 SCREENING FOR HYPERLIPIDEMIA: ICD-10-CM

## 2022-11-14 DIAGNOSIS — F41.1 GENERALIZED ANXIETY DISORDER: ICD-10-CM

## 2022-11-14 PROCEDURE — 99214 OFFICE O/P EST MOD 30 MIN: CPT | Performed by: NURSE PRACTITIONER

## 2022-11-14 PROCEDURE — 96127 BRIEF EMOTIONAL/BEHAV ASSMT: CPT | Performed by: NURSE PRACTITIONER

## 2022-11-14 RX ORDER — LISDEXAMFETAMINE DIMESYLATE 50 MG
50 CAPSULE ORAL EVERY MORNING
Qty: 30 CAPSULE | Refills: 0 | Status: SHIPPED | OUTPATIENT
Start: 2022-11-14 | End: 2022-11-14

## 2022-11-14 RX ORDER — LISDEXAMFETAMINE DIMESYLATE 50 MG
50 CAPSULE ORAL EVERY MORNING
Qty: 30 CAPSULE | Refills: 0 | Status: SHIPPED | OUTPATIENT
Start: 2022-11-14 | End: 2023-01-09

## 2022-11-14 RX ORDER — ESCITALOPRAM OXALATE 20 MG/1
TABLET ORAL
Qty: 90 TABLET | Refills: 3 | Status: SHIPPED | OUTPATIENT
Start: 2022-11-14 | End: 2023-11-03

## 2022-11-14 RX ORDER — TRAMADOL HYDROCHLORIDE 50 MG/1
50 TABLET ORAL 2 TIMES DAILY PRN
Qty: 60 TABLET | Refills: 3 | Status: SHIPPED | OUTPATIENT
Start: 2022-11-14 | End: 2023-03-09

## 2022-11-14 RX ORDER — LISDEXAMFETAMINE DIMESYLATE 50 MG
50 CAPSULE ORAL EVERY MORNING
Status: CANCELLED | OUTPATIENT
Start: 2022-11-14

## 2022-11-14 RX ORDER — BUPROPION HYDROCHLORIDE 150 MG/1
150 TABLET ORAL EVERY MORNING
Qty: 90 TABLET | Refills: 3 | Status: SHIPPED | OUTPATIENT
Start: 2022-11-14 | End: 2023-09-28

## 2022-11-14 RX ORDER — AMLODIPINE BESYLATE 5 MG/1
5 TABLET ORAL DAILY
Qty: 90 TABLET | Refills: 3 | Status: SHIPPED | OUTPATIENT
Start: 2022-11-14 | End: 2023-09-20

## 2022-11-14 ASSESSMENT — ANXIETY QUESTIONNAIRES
GAD7 TOTAL SCORE: 3
1. FEELING NERVOUS, ANXIOUS, OR ON EDGE: SEVERAL DAYS
5. BEING SO RESTLESS THAT IT IS HARD TO SIT STILL: SEVERAL DAYS
GAD7 TOTAL SCORE: 3
3. WORRYING TOO MUCH ABOUT DIFFERENT THINGS: NOT AT ALL
7. FEELING AFRAID AS IF SOMETHING AWFUL MIGHT HAPPEN: NOT AT ALL
6. BECOMING EASILY ANNOYED OR IRRITABLE: NOT AT ALL
GAD7 TOTAL SCORE: 3
4. TROUBLE RELAXING: SEVERAL DAYS
2. NOT BEING ABLE TO STOP OR CONTROL WORRYING: NOT AT ALL
7. FEELING AFRAID AS IF SOMETHING AWFUL MIGHT HAPPEN: NOT AT ALL

## 2022-11-14 ASSESSMENT — PATIENT HEALTH QUESTIONNAIRE - PHQ9
SUM OF ALL RESPONSES TO PHQ QUESTIONS 1-9: 3
SUM OF ALL RESPONSES TO PHQ QUESTIONS 1-9: 3
10. IF YOU CHECKED OFF ANY PROBLEMS, HOW DIFFICULT HAVE THESE PROBLEMS MADE IT FOR YOU TO DO YOUR WORK, TAKE CARE OF THINGS AT HOME, OR GET ALONG WITH OTHER PEOPLE: NOT DIFFICULT AT ALL

## 2022-11-14 NOTE — LETTER
My Depression Action Plan  Name: Angelique Rojas   Date of Birth 1965  Date: 11/14/2022    My doctor: Loraine Buchanan   My clinic: [address] [address] [phone]          GREEN    ZONE   Good Control    What it looks like:     Things are going generally well. You have normal ups and downs. You may even feel depressed from time to time, but bad moods usually last less than a day.   What you need to do:  1. Continue to care for yourself (see self care plan)  2. Check your depression survival kit and update it as needed  3. Follow your physician s recommendations including any medication.  4. Do not stop taking medication unless you consult with your physician first.           YELLOW         ZONE Getting Worse    What it looks like:     Depression is starting to interfere with your life.     It may be hard to get out of bed; you may be starting to isolate yourself from others.    Symptoms of depression are starting to last most all day and this has happened for several days.     You may have suicidal thoughts but they are not constant.   What you need to do:     1. Call your care team. Your response to treatment will improve if you keep your care team informed of your progress. Yellow periods are signs an adjustment may need to be made.     2. Continue your self-care.  Just get dressed and ready for the day.  Don't give yourself time to talk yourself out of it.    3. Talk to someone in your support network.    4. Open up your Depression Self-Care Plan/Wellness Kit.           RED    ZONE Medical Alert - Get Help    What it looks like:     Depression is seriously interfering with your life.     You may experience these or other symptoms: You can t get out of bed most days, can t work or engage in other necessary activities, you have trouble taking care of basic hygiene, or basic responsibilities, thoughts of suicide or death that  will not go away, self-injurious behavior.     What you need to do:  1. Call your care team and request a same-day appointment. If they are not available (weekends or after hours) call your local crisis line, emergency room or 911.          Depression Self-Care Plan / Wellness Kit    Many people find that medication and therapy are helpful treatments for managing depression. In addition, making small changes to your everyday life can help to boost your mood and improve your wellbeing. Below are some tips for you to consider. Be sure to talk with your medical provider and/or behavioral health consultant if your symptoms are worsening or not improving.     Sleep   Sleep hygiene  means all of the habits that support good, restful sleep. It includes maintaining a consistent bedtime and wake time, using your bedroom only for sleeping or sex, and keeping the bedroom dark and free of distractions like a computer, smartphone, or television.     Develop a Healthy Routine  Maintain good hygiene. Get out of bed in the morning, make your bed, brush your teeth, take a shower, and get dressed. Don t spend too much time viewing media that makes you feel stressed. Find time to relax each day.    Exercise  Get some form of exercise every day. This will help reduce pain and release endorphins, the  feel good  chemicals in your brain. It can be as simple as just going for a walk or doing some gardening, anything that will get you moving.      Diet  Strive to eat healthy foods, including fruits and vegetables. Drink plenty of water. Avoid excessive sugar, caffeine, alcohol, and other mood-altering substances.     Stay Connected with Others  Stay in touch with friends and family members.    Manage Your Mood  Try deep breathing, massage therapy, biofeedback, or meditation. Take part in fun activities when you can. Try to find something to smile about each day.     Psychotherapy  Be open to working with a therapist if your provider  recommends it.     Medication  Be sure to take your medication as prescribed. Most anti-depressants need to be taken every day. It usually takes several weeks for medications to work. Not all medicines work for all people. It is important to follow-up with your provider to make sure you have a treatment plan that is working for you. Do not stop your medication abruptly without first discussing it with your provider.    Crisis Resources   These hotlines are for both adults and children. They and are open 24 hours a day, 7 days a week unless noted otherwise.      National Suicide Prevention Lifeline   988 or 0-920-564-WYBA (4394)      Crisis Text Line    www.crisistextline.org  Text HOME to 299541 from anywhere in the [place], anytime, about any type of crisis. A live, trained crisis counselor will receive the text and respond quickly.      [facility] Lifeline for LGBTQ Youth  A national crisis intervention and suicide lifeline for LGBTQ youth under 25. Provides a safe place to talk without judgement. Call 1-179.636.1418; text START to 729309 or visit www.thetrevorproject.org to talk to a trained counselor.      For [facility] crisis numbers, visit the [facility] website at:  https://[place].gov/dhs/people-we-serve/adults/health-care/mental-health/resources/crisis-contacts.jsp

## 2022-11-14 NOTE — TELEPHONE ENCOUNTER
JUANITA-7 SCORE 10/29/2015 3/30/2022 11/14/2022   Total Score - - -   Total Score - 1 (minimal anxiety) 3 (minimal anxiety)   Total Score 1 1 3

## 2022-11-14 NOTE — PROGRESS NOTES
"  Assessment & Plan  iso  Comment:   Plan: (F98.8) Atten  0956}  (F98.8) Attention deficit disorder (ADD) without hyperactivity  (primary encounter diagnosis)  Comment: Agreeable to transferring care to me for this as Dr. Melendrez has left practice.  The PDMP is checked.  She should call in 2 months for refills and can see me every 4 months.  Her urine drug screen is up-to-date as this is a controlled substance agreement  Plan: VYVANSE 50 MG capsule, DISCONTINUED: VYVANSE 50        MG capsule            (Z13.220) Screening for hyperlipidemia  Comment:   Plan: REVIEW OF HEALTH MAINTENANCE PROTOCOL ORDERS,         Lipid panel reflex to direct LDL Fasting        She will schedule a fasting lab test    (I10) Primary hypertension  Comment: Well-controlled labs up-to-date refilled for 1 year  Plan: amLODIPine (NORVASC) 5 MG tablet            (F33.1) Major depressive disorder, recurrent episode, moderate (H)  Comment: Well controlled.  She will fill out a JUANITA-7.  PHQ-9 score is under 5  Plan: buPROPion (WELLBUTRIN XL) 150 MG 24 hr tablet            (M54.9,  G89.29) Chronic back pain, unspecified back location, unspecified back pain laterality  Comment: CSA and urine drug screen is up-to-date.  JUANITA-7 and PHQ-9 are up-to-date refilled for 4 months and that she should see me in clinic  Plan: traMADol (ULTRAM) 50 MG tablet            (M54.2,  G89.29) Chronic neck pain  Comment:   Plan: traMADol (ULTRAM) 50 MG tablet            (F41.1) Generalized anxiety disorder  Comment:   Plan: escitalopram (LEXAPRO) 20 MG tablet                 BMI:   Estimated body mass index is 32.36 kg/m  as calculated from the following:    Height as of this encounter: 1.67 m (5' 5.75\").    Weight as of this encounter: 90.3 kg (199 lb).           No follow-ups on file.    Loraine Buchanan NP  [facility]   Karlene is a 57 year old, presenting for the following health issues:    ADD--Dr. Roberts her psychiatrist is no " "longer in practice.  She would like me to prescribe her Vyvanse.  She has been very stable on this.  Urine drug screen is acceptable.  Her JUANITA-7 and PHQ-9 are acceptable    Chronic pain--tramadol twice a day is well-tolerated.  We checked the PDMP where she had 2 prescriptions for a benzodiazepine and tells me that was her dentist.  She did not take the tramadol on the days that she needed a concerning nature benzodiazepine for dental procedures.  We reviewed the interaction of those 2 medications and she knows not to take them simultaneously.  She can see me every 4 months for chronic pain that is controlled with tramadol    Depression: Well-controlled with Wellbutrin and Lexapro.  She would like refills.  Her PHQ-9 score is excellent    Hypertension      History of Present Illness       Hypertension: She presents for follow up of hypertension.  She does check blood pressure  regularly outside of the clinic. Outpatient blood pressures have not been over 140/90. She follows a low salt diet.      Today's PHQ-9         PHQ-9 Total Score: 3    PHQ-9 Q9 Thoughts of better off dead/self-harm past 2 weeks :   Not at all    How difficult have these problems made it for you to do your work, take care of things at home, or get along with other people: Not difficult at all     JUANITA-7 in progress    JUANITA-7 SCORE 9/23/2013 10/29/2015 3/30/2022   Total Score 4 - -   Total Score - - 1 (minimal anxiety)   Total Score - 1 1       Review of Systems         Objective    /77   Pulse 76   Temp 98.9  F (37.2  C)   Resp 20   Ht 1.67 m (5' 5.75\")   Wt 90.3 kg (199 lb)   LMP 08/06/2012   SpO2 98%   BMI 32.36 kg/m    Body mass index is 32.36 kg/m .  Physical Exam   Alert and oriented no acute distress  Good eye contact  Lungs clear and heart regular                      "

## 2022-12-22 DIAGNOSIS — G43.901 MIGRAINE WITH STATUS MIGRAINOSUS, NOT INTRACTABLE, UNSPECIFIED MIGRAINE TYPE: ICD-10-CM

## 2022-12-23 RX ORDER — ZOLMITRIPTAN 5 MG/1
TABLET, ORALLY DISINTEGRATING ORAL
Qty: 12 TABLET | Refills: 0 | Status: SHIPPED | OUTPATIENT
Start: 2022-12-23 | End: 2024-07-22

## 2022-12-23 NOTE — TELEPHONE ENCOUNTER
Routing refill request to provider for review/approval because:  Drug fails the [facility] refill protocol         Last Written Prescription Date: 7/26/22  Last Fill Quantity: 12,  # refills: 0   Last office visit: 11/14/2022 with prescribing provider

## 2023-01-09 ENCOUNTER — MYC REFILL (OUTPATIENT)
Dept: FAMILY MEDICINE | Facility: CLINIC | Age: 58
End: 2023-01-09

## 2023-01-09 DIAGNOSIS — F98.8 ATTENTION DEFICIT DISORDER (ADD) WITHOUT HYPERACTIVITY: ICD-10-CM

## 2023-01-09 RX ORDER — LISDEXAMFETAMINE DIMESYLATE 50 MG
50 CAPSULE ORAL EVERY MORNING
Qty: 30 CAPSULE | Refills: 0 | Status: SHIPPED | OUTPATIENT
Start: 2023-01-09 | End: 2023-01-09

## 2023-01-09 RX ORDER — LISDEXAMFETAMINE DIMESYLATE 50 MG
50 CAPSULE ORAL EVERY MORNING
Qty: 30 CAPSULE | Refills: 0 | Status: SHIPPED | OUTPATIENT
Start: 2023-01-09 | End: 2023-03-09

## 2023-01-09 NOTE — TELEPHONE ENCOUNTER
Last office visit: 11/14/2022     Future Appointments 1/9/2023 - 7/8/2023    None          Requested Prescriptions   Pending Prescriptions Disp Refills     VYVANSE 50 MG capsule 30 capsule 0     Sig: Take 1 capsule (50 mg) by mouth every morning       There is no refill protocol information for this order

## 2023-01-20 ENCOUNTER — TELEPHONE (OUTPATIENT)
Dept: FAMILY MEDICINE | Facility: CLINIC | Age: 58
End: 2023-01-20
Payer: COMMERCIAL

## 2023-01-20 NOTE — TELEPHONE ENCOUNTER
PA initiated for Tramadol HCI 50mg tabs via CoverMyMeds. Awating approval or denial from insurance co.

## 2023-03-09 ENCOUNTER — OFFICE VISIT (OUTPATIENT)
Dept: FAMILY MEDICINE | Facility: CLINIC | Age: 58
End: 2023-03-09
Payer: COMMERCIAL

## 2023-03-09 VITALS
WEIGHT: 203.4 LBS | TEMPERATURE: 98.2 F | SYSTOLIC BLOOD PRESSURE: 124 MMHG | HEIGHT: 66 IN | HEART RATE: 66 BPM | OXYGEN SATURATION: 100 % | DIASTOLIC BLOOD PRESSURE: 84 MMHG | RESPIRATION RATE: 20 BRPM | BODY MASS INDEX: 32.69 KG/M2

## 2023-03-09 DIAGNOSIS — M54.9 CHRONIC BACK PAIN, UNSPECIFIED BACK LOCATION, UNSPECIFIED BACK PAIN LATERALITY: ICD-10-CM

## 2023-03-09 DIAGNOSIS — G89.29 CHRONIC BACK PAIN, UNSPECIFIED BACK LOCATION, UNSPECIFIED BACK PAIN LATERALITY: ICD-10-CM

## 2023-03-09 DIAGNOSIS — E66.811 CLASS 1 OBESITY DUE TO EXCESS CALORIES WITHOUT SERIOUS COMORBIDITY WITH BODY MASS INDEX (BMI) OF 33.0 TO 33.9 IN ADULT: Primary | ICD-10-CM

## 2023-03-09 DIAGNOSIS — F98.8 ATTENTION DEFICIT DISORDER (ADD) WITHOUT HYPERACTIVITY: ICD-10-CM

## 2023-03-09 DIAGNOSIS — Z13.220 SCREENING FOR HYPERLIPIDEMIA: ICD-10-CM

## 2023-03-09 DIAGNOSIS — E66.09 CLASS 1 OBESITY DUE TO EXCESS CALORIES WITHOUT SERIOUS COMORBIDITY WITH BODY MASS INDEX (BMI) OF 33.0 TO 33.9 IN ADULT: Primary | ICD-10-CM

## 2023-03-09 LAB
CHOLEST SERPL-MCNC: 238 MG/DL
HBA1C MFR BLD: 5.5 % (ref 0–5.6)
HDLC SERPL-MCNC: 67 MG/DL
LDLC SERPL CALC-MCNC: 143 MG/DL
NONHDLC SERPL-MCNC: 171 MG/DL
TRIGL SERPL-MCNC: 139 MG/DL
TSH SERPL DL<=0.005 MIU/L-ACNC: 1.34 UIU/ML (ref 0.3–4.2)

## 2023-03-09 PROCEDURE — 84443 ASSAY THYROID STIM HORMONE: CPT | Performed by: NURSE PRACTITIONER

## 2023-03-09 PROCEDURE — 83036 HEMOGLOBIN GLYCOSYLATED A1C: CPT | Performed by: NURSE PRACTITIONER

## 2023-03-09 PROCEDURE — 80061 LIPID PANEL: CPT | Performed by: NURSE PRACTITIONER

## 2023-03-09 PROCEDURE — 36415 COLL VENOUS BLD VENIPUNCTURE: CPT | Performed by: NURSE PRACTITIONER

## 2023-03-09 PROCEDURE — 99214 OFFICE O/P EST MOD 30 MIN: CPT | Performed by: NURSE PRACTITIONER

## 2023-03-09 RX ORDER — LISDEXAMFETAMINE DIMESYLATE 50 MG
50 CAPSULE ORAL EVERY MORNING
Qty: 30 CAPSULE | Refills: 0 | Status: CANCELLED | OUTPATIENT
Start: 2023-03-09

## 2023-03-09 RX ORDER — LISDEXAMFETAMINE DIMESYLATE 50 MG
50 CAPSULE ORAL EVERY MORNING
Qty: 30 CAPSULE | Refills: 0 | Status: SHIPPED | OUTPATIENT
Start: 2023-03-09 | End: 2023-05-03

## 2023-03-09 RX ORDER — BUPRENORPHINE 15 UG/H
1 PATCH TRANSDERMAL
Qty: 12 PATCH | Refills: 0 | Status: SHIPPED | OUTPATIENT
Start: 2023-03-09 | End: 2023-05-04

## 2023-03-09 RX ORDER — LISDEXAMFETAMINE DIMESYLATE 50 MG
50 CAPSULE ORAL EVERY MORNING
Qty: 30 CAPSULE | Refills: 0 | Status: SHIPPED | OUTPATIENT
Start: 2023-03-09 | End: 2023-03-09

## 2023-03-09 NOTE — PROGRESS NOTES
"  Assessment & Plan     (E66.09,  Z68.33) Class 1 obesity due to excess calories without serious comorbidity with body mass index (BMI) of 33.0 to 33.9 in adult  (primary encounter diagnosis)  Comment:  Plan: Lipid panel reflex to direct LDL Fasting, TSH,         Hemoglobin A1c            (F98.8) Attention deficit disorder (ADD) without hyperactivity  Comment:   Plan: VYVANSE 50 MG capsule, DISCONTINUED: VYVANSE 50        MG capsule            (Z13.220) Screening for hyperlipidemia  Comment:   Plan:     (M54.9,  G89.29) Chronic back pain, unspecified back location, unspecified back pain laterality  Comment: would like to stop tramadol and restart Butrans and former keeps her awake at night  Plan: buprenorphine (BUTRANS) 15 MCG/HR Wk patch                       BMI:   Estimated body mass index is 33.08 kg/m  as calculated from the following:    Height as of this encounter: 1.67 m (5' 5.75\").    Weight as of this encounter: 92.3 kg (203 lb 6.4 oz).   Weight management plan: Patient referred to endocrine and/or weight management specialty        No follow-ups on file.    Loraine Buchanan NP  [facility]    Cari Soler is a 57 year old accompanied by her self, presenting for the following health issues:  Hypertension (HTN follow up)      History of Present Illness       Hypertension: She presents for follow up of hypertension.  She does check blood pressure  regularly outside of the clinic. Outpatient blood pressures have not been over 140/90. She follows a low salt diet.     Reason for visit:  BP check and med review with refills.    She eats 2-3 servings of fruits and vegetables daily.She consumes 4 sweetened beverage(s) daily.She exercises with enough effort to increase her heart rate 9 or less minutes per day.  She exercises with enough effort to increase her heart rate 3 or less days per week.   She is taking medications regularly.             Review of Systems         Objective    BP " "124/84 (BP Location: Right arm, Patient Position: Sitting, Cuff Size: Adult Large)   Pulse 66   Temp 98.2  F (36.8  C) (Tympanic)   Resp 20   Ht 1.67 m (5' 5.75\")   Wt 92.3 kg (203 lb 6.4 oz)   LMP 08/06/2012   SpO2 100%   BMI 33.08 kg/m    Body mass index is 33.08 kg/m .  Physical Exam   GENERAL: healthy, alert and no distress  NECK: no adenopathy, no asymmetry, masses, or scars and thyroid normal to palpation  RESP: lungs clear to auscultation - no rales, rhonchi or wheezes  CV: regular rate and rhythm, normal S1 S2, no S3 or S4, no murmur, click or rub, no peripheral edema and peripheral pulses strong  MS: no gross musculoskeletal defects noted, no edema                    "

## 2023-05-04 DIAGNOSIS — G89.29 CHRONIC BACK PAIN, UNSPECIFIED BACK LOCATION, UNSPECIFIED BACK PAIN LATERALITY: ICD-10-CM

## 2023-05-04 DIAGNOSIS — M54.9 CHRONIC BACK PAIN, UNSPECIFIED BACK LOCATION, UNSPECIFIED BACK PAIN LATERALITY: ICD-10-CM

## 2023-05-04 RX ORDER — BUPRENORPHINE 15 UG/H
1 PATCH TRANSDERMAL
Qty: 4 PATCH | Refills: 0 | Status: SHIPPED | OUTPATIENT
Start: 2023-06-02 | End: 2023-06-02

## 2023-05-04 RX ORDER — BUPRENORPHINE 15 UG/H
1 PATCH TRANSDERMAL
Qty: 4 PATCH | Refills: 0 | Status: SHIPPED | OUTPATIENT
Start: 2023-05-04 | End: 2023-06-02

## 2023-05-20 ENCOUNTER — HEALTH MAINTENANCE LETTER (OUTPATIENT)
Age: 58
End: 2023-05-20

## 2023-05-30 ASSESSMENT — ENCOUNTER SYMPTOMS
HEARTBURN: 0
MYALGIAS: 0
ARTHRALGIAS: 1
NAUSEA: 0
HEMATURIA: 0
SORE THROAT: 0
DIARRHEA: 0
HEMATOCHEZIA: 0
ABDOMINAL PAIN: 0
NERVOUS/ANXIOUS: 0
DYSURIA: 0
SHORTNESS OF BREATH: 0
DIZZINESS: 0
PALPITATIONS: 0
COUGH: 0
JOINT SWELLING: 1
FEVER: 0
FREQUENCY: 0
WEAKNESS: 0
BREAST MASS: 0
CHILLS: 0
EYE PAIN: 0
HEADACHES: 0
PARESTHESIAS: 0
CONSTIPATION: 0

## 2023-06-02 ENCOUNTER — OFFICE VISIT (OUTPATIENT)
Dept: FAMILY MEDICINE | Facility: CLINIC | Age: 58
End: 2023-06-02
Payer: COMMERCIAL

## 2023-06-02 ENCOUNTER — ANCILLARY PROCEDURE (OUTPATIENT)
Dept: GENERAL RADIOLOGY | Facility: CLINIC | Age: 58
End: 2023-06-02
Attending: NURSE PRACTITIONER
Payer: COMMERCIAL

## 2023-06-02 VITALS
BODY MASS INDEX: 32.33 KG/M2 | HEART RATE: 72 BPM | SYSTOLIC BLOOD PRESSURE: 116 MMHG | TEMPERATURE: 97.8 F | HEIGHT: 66 IN | OXYGEN SATURATION: 99 % | DIASTOLIC BLOOD PRESSURE: 76 MMHG | WEIGHT: 201.2 LBS

## 2023-06-02 DIAGNOSIS — F98.8 ATTENTION DEFICIT DISORDER (ADD) WITHOUT HYPERACTIVITY: ICD-10-CM

## 2023-06-02 DIAGNOSIS — M54.6 ACUTE MIDLINE THORACIC BACK PAIN: ICD-10-CM

## 2023-06-02 DIAGNOSIS — G89.29 CHRONIC BACK PAIN, UNSPECIFIED BACK LOCATION, UNSPECIFIED BACK PAIN LATERALITY: ICD-10-CM

## 2023-06-02 DIAGNOSIS — Z00.00 ROUTINE GENERAL MEDICAL EXAMINATION AT A HEALTH CARE FACILITY: Primary | ICD-10-CM

## 2023-06-02 DIAGNOSIS — M54.9 CHRONIC BACK PAIN, UNSPECIFIED BACK LOCATION, UNSPECIFIED BACK PAIN LATERALITY: ICD-10-CM

## 2023-06-02 PROCEDURE — 72072 X-RAY EXAM THORAC SPINE 3VWS: CPT | Mod: TC | Performed by: STUDENT IN AN ORGANIZED HEALTH CARE EDUCATION/TRAINING PROGRAM

## 2023-06-02 PROCEDURE — 99213 OFFICE O/P EST LOW 20 MIN: CPT | Mod: 25 | Performed by: NURSE PRACTITIONER

## 2023-06-02 PROCEDURE — 99396 PREV VISIT EST AGE 40-64: CPT | Performed by: NURSE PRACTITIONER

## 2023-06-02 RX ORDER — LISDEXAMFETAMINE DIMESYLATE 50 MG
50 CAPSULE ORAL EVERY MORNING
Qty: 30 CAPSULE | Refills: 0 | Status: SHIPPED | OUTPATIENT
Start: 2023-06-30 | End: 2023-07-30

## 2023-06-02 RX ORDER — LISDEXAMFETAMINE DIMESYLATE 50 MG
50 CAPSULE ORAL EVERY MORNING
Qty: 30 CAPSULE | Refills: 0 | Status: SHIPPED | OUTPATIENT
Start: 2023-06-02 | End: 2023-09-28

## 2023-06-02 RX ORDER — BUPRENORPHINE 20 UG/H
1 PATCH TRANSDERMAL
Qty: 4 PATCH | Refills: 1 | Status: SHIPPED | OUTPATIENT
Start: 2023-06-02 | End: 2023-06-29

## 2023-06-02 ASSESSMENT — ENCOUNTER SYMPTOMS
BREAST MASS: 0
PARESTHESIAS: 0
NERVOUS/ANXIOUS: 1
WEAKNESS: 0
HEARTBURN: 0
CONSTIPATION: 0
MYALGIAS: 0
DYSURIA: 0
NAUSEA: 0
ABDOMINAL PAIN: 0
FREQUENCY: 0
HEMATOCHEZIA: 0
SORE THROAT: 0
HEMATURIA: 0
ARTHRALGIAS: 1
JOINT SWELLING: 1
DIARRHEA: 0
DIZZINESS: 0
HEADACHES: 0
SHORTNESS OF BREATH: 0
FEVER: 0
PALPITATIONS: 0
CHILLS: 0
EYE PAIN: 0
COUGH: 0

## 2023-06-02 ASSESSMENT — PATIENT HEALTH QUESTIONNAIRE - PHQ9
SUM OF ALL RESPONSES TO PHQ QUESTIONS 1-9: 3
10. IF YOU CHECKED OFF ANY PROBLEMS, HOW DIFFICULT HAVE THESE PROBLEMS MADE IT FOR YOU TO DO YOUR WORK, TAKE CARE OF THINGS AT HOME, OR GET ALONG WITH OTHER PEOPLE: NOT DIFFICULT AT ALL
SUM OF ALL RESPONSES TO PHQ QUESTIONS 1-9: 3

## 2023-06-02 NOTE — PROGRESS NOTES
SUBJECTIVE:   CC: Karlene is an 58 year old who presents for preventive health visit.       6/2/2023    10:41 AM   Additional Questions   Roomed by priscilla simmons   Accompanied by self     Healthy Habits:     Getting at least 3 servings of Calcium per day:  NO    Bi-annual eye exam:  Yes    Dental care twice a year:  Yes    Sleep apnea or symptoms of sleep apnea:  Daytime drowsiness    Diet:  Regular (no restrictions)    Frequency of exercise:  2-3 days/week    Duration of exercise:  30-45 minutes    Taking medications regularly:  Yes    Medication side effects:  None    PHQ-2 Total Score: 0    Additional concerns today:  Yes  Anxiety  Associated symptoms include arthralgias and joint swelling. Pertinent negatives include no abdominal pain, chest pain, chills, congestion, coughing, fever, headaches, myalgias, nausea, rash, sore throat or weakness.       Would like to talk about her pain control.  She has significant osteoarthritis, had a knee replacement last year, has struggled with chronic neck pain for years.  She suffered a GI bleed due to needed but excessive NSAID use.  She agreed to hold opioid use and switch to Butrans patch.  She has experienced some pain relief but it is not great pain relief.  She is willing to increase the dose to the maximum dose patch but I asked her today to consider seeing the pain clinic for other thoughts on how to best control her pain.  She does have new pain in the neck which she relates to may be a pulling injury because of a new dog.  She would like an x-ray and I think that is reasonable and if there is not a fracture it might be reasonable to try physical therapy.    Pap is up-to-date    She had a colonoscopy with recommendation for 10-year repeat at the time when she had her GI bleed no family history of colon polyps or colon cancer    She is up-to-date with mammograms    She needs a refill on her ADD medication which is going well and we will increase her Butrans today      Have  you ever done Advance Care Planning? (For example, a Health Directive, POLST, or a discussion with a medical provider or your loved ones about your wishes): Yes, patient states has an Advance Care Planning document and will bring a copy to the clinic.    Social History     Tobacco Use     Smoking status: Former     Packs/day: 1.00     Years: 4.00     Pack years: 4.00     Types: Cigarettes     Quit date: 3/14/1991     Years since quittin.2     Smokeless tobacco: Never   Vaping Use     Vaping status: Never Used   Substance Use Topics     Alcohol use: Not Currently     Alcohol/week: 0.0 standard drinks of alcohol             2023     7:26 PM   Alcohol Use   Prescreen: >3 drinks/day or >7 drinks/week? Not Applicable   Reviewed orders with patient.  Reviewed health maintenance and updated orders accordingly - Yes  Lab work is in process    Breast Cancer Screening:    FHS-7:       2023     7:27 PM   Breast CA Risk Assessment (FHS-7)   Did any of your first-degree relatives have breast or ovarian cancer? Yes   Did any of your relatives have bilateral breast cancer? No   Did any man in your family have breast cancer? No   Did any woman in your family have breast and ovarian cancer? No   Did any woman in your family have breast cancer before age 50 y? No   Do you have 2 or more relatives with breast and/or ovarian cancer? No   Do you have 2 or more relatives with breast and/or bowel cancer? No       Mammogram Screening: Recommended mammography every 1-2 years with patient discussion and risk factor consideration  Pertinent mammograms are reviewed under the imaging tab.    History of abnormal Pap smear: NO - age 30-65 PAP every 5 years with negative HPV co-testing recommended      Latest Ref Rng & Units 10/28/2015     2:10 PM 10/28/2015    12:00 AM 10/5/2012    12:28 PM   PAP / HPV   PAP (Historical)   NIL   NIL     HPV 16 DNA NEG Negative       HPV 18 DNA NEG Negative       Other HR HPV NEG Negative      "    Reviewed and updated as needed this visit by clinical staff   Tobacco  Allergies  Meds              Reviewed and updated as needed this visit by Provider                     Review of Systems   Constitutional: Negative for chills and fever.   HENT: Negative for congestion, ear pain, hearing loss and sore throat.    Eyes: Negative for pain and visual disturbance.   Respiratory: Negative for cough and shortness of breath.    Cardiovascular: Negative for chest pain, palpitations and peripheral edema.   Gastrointestinal: Negative for abdominal pain, constipation, diarrhea, heartburn, hematochezia and nausea.   Breasts:  Negative for tenderness, breast mass and discharge.   Genitourinary: Negative for dysuria, frequency, genital sores, hematuria, pelvic pain, urgency, vaginal bleeding and vaginal discharge.   Musculoskeletal: Positive for arthralgias and joint swelling. Negative for myalgias.   Skin: Negative for rash.   Neurological: Negative for dizziness, weakness, headaches and paresthesias.   Psychiatric/Behavioral: Negative for mood changes. The patient is nervous/anxious.           OBJECTIVE:   /76 (BP Location: Right arm, Patient Position: Sitting)   Pulse 72   Temp 97.8  F (36.6  C)   Ht 1.683 m (5' 6.25\")   Wt 91.3 kg (201 lb 3.2 oz)   LMP  (LMP Unknown)   SpO2 99%   BMI 32.23 kg/m    Physical Exam  GENERAL: healthy, alert and no distress  EYES: Eyes grossly normal to inspection, PERRL and conjunctivae and sclerae normal  HENT: ear canals and TM's normal, nose and mouth without ulcers or lesions  NECK: no adenopathy, no asymmetry, masses, or scars and thyroid normal to palpation  RESP: lungs clear to auscultation - no rales, rhonchi or wheezes  CV: regular rate and rhythm, normal S1 S2, no S3 or S4, no murmur, click or rub, no peripheral edema and peripheral pulses strong  ABDOMEN: soft, nontender, no hepatosplenomegaly, no masses and bowel sounds normal  MS: no gross musculoskeletal defects " noted, no edema  SKIN: no suspicious lesions or rashes  She is tender over the high thoracic vertebrae no redness or erythema        ASSESSMENT/PLAN:       ICD-10-CM    1. Routine general medical examination at a health care facility  Z00.00       2. Chronic back pain, unspecified back location, unspecified back pain laterality  M54.9 buprenorphine (BUTRANS) 20 MCG/HR WK patch    G89.29 Pain Management  Referral      3. Acute midline thoracic back pain  M54.6 XR Thoracic Spine 3 Views     CANCELED: XR Thoracic Spine 2 Views      4. Attention deficit disorder (ADD) without hyperactivity  F98.8 VYVANSE 50 MG capsule     VYVANSE 50 MG capsule          Patient has been advised of split billing requirements and indicates understanding: Yes      COUNSELING:  Reviewed preventive health counseling, as reflected in patient instructions       Regular exercise       Healthy diet/nutrition       Vision screening       Osteoporosis prevention/bone health       Colorectal Cancer Screening       Advance Care Planning        She reports that she quit smoking about 32 years ago. Her smoking use included cigarettes. She has a 4.00 pack-year smoking history. She has never used smokeless tobacco.      Loraine Buchanan NP  [facility]  Answers for HPI/ROS submitted by the patient on 6/2/2023  If you checked off any problems, how difficult have these problems made it for you to do your work, take care of things at home, or get along with other people?: Not difficult at all  PHQ9 TOTAL SCORE: 3

## 2023-06-02 NOTE — PATIENT INSTRUCTIONS
Please bring in health care directive    Here is the number for physical therapy:    You have been referred to Physical Therapy. You can contact the physical therapy office directly to schedule your appointment at the number below.    [facility] - [phone]    If you have any questions or you need further assistance, please contact the clinic at [phone].    Preventive Health Recommendations  Female Ages 50 - 64    Yearly exam: See your health care provider every year in order to  Review health changes.   Discuss preventive care.    Review your medicines if your doctor has prescribed any.    Get a Pap test every three years (unless you have an abnormal result and your provider advises testing more often).  If you get Pap tests with HPV test, you only need to test every 5 years, unless you have an abnormal result.   You do not need a Pap test if your uterus was removed (hysterectomy) and you have not had cancer.  You should be tested each year for STDs (sexually transmitted diseases) if you're at risk.   Have a mammogram every 1 to 2 years.  Have a colonoscopy at age 50, or have a yearly FIT test (stool test). These exams screen for colon cancer.    Have a cholesterol test every 5 years, or more often if advised.  Have a diabetes test (fasting glucose) every three years. If you are at risk for diabetes, you should have this test more often.   If you are at risk for osteoporosis (brittle bone disease), think about having a bone density scan (DEXA).    Shots: Get a flu shot each year. Get a tetanus shot every 10 years.    Nutrition:   Eat at least 5 servings of fruits and vegetables each day.  Eat whole-grain bread, whole-wheat pasta and brown rice instead of white grains and rice.  Get adequate Calcium and Vitamin D.     Lifestyle  Exercise at least 150 minutes a week (30 minutes a day, 5 days a week). This will help you control your weight and prevent disease.  Limit alcohol to  one drink per day.  No smoking.   Wear sunscreen to prevent skin cancer.   See your dentist every six months for an exam and cleaning.  See your eye doctor every 1 to 2 years.

## 2023-06-03 ENCOUNTER — MYC MEDICAL ADVICE (OUTPATIENT)
Dept: FAMILY MEDICINE | Facility: CLINIC | Age: 58
End: 2023-06-03
Payer: COMMERCIAL

## 2023-06-03 DIAGNOSIS — M54.2 CHRONIC NECK PAIN: ICD-10-CM

## 2023-06-03 DIAGNOSIS — G89.29 CHRONIC BACK PAIN, UNSPECIFIED BACK LOCATION, UNSPECIFIED BACK PAIN LATERALITY: Primary | ICD-10-CM

## 2023-06-03 DIAGNOSIS — G89.29 CHRONIC NECK PAIN: ICD-10-CM

## 2023-06-03 DIAGNOSIS — M54.9 CHRONIC BACK PAIN, UNSPECIFIED BACK LOCATION, UNSPECIFIED BACK PAIN LATERALITY: Primary | ICD-10-CM

## 2023-06-04 ENCOUNTER — TELEPHONE (OUTPATIENT)
Dept: FAMILY MEDICINE | Facility: CLINIC | Age: 58
End: 2023-06-04
Payer: COMMERCIAL

## 2023-06-04 NOTE — LETTER
June 6, 2023      To whom it may concern      Regarding:    Angelique Rojas  [address]  [place] [address]    I have been asked to write a letter of medical necessity for Angelique Rojas.  Ms. Rojas suffers from degenerative joint disease of her neck and back.  In efforts to curb pain she had used nonsteroidal anti-inflammatory medications in the past to the point where she suffered a gastric bleed and was hospitalized and transfused blood.  Upon discharge she consulted with her primary provider about other alternatives for pain control.  She has seen multiple specialists.    MS. Rojas used opioids for a number of months but found the side effects and the risks associated with opioids intolerable.  When I met her approximately 1 year ago I asked if she would try Butrans as an alternative to opioids to reduce risk of accidental overdose and side effects of opioids.  She was willing to make this change in hopes of controlling pain in a much safer way.  She has done fairly well on the dose of Butrans Patch 15 mcg/72 hours.  When I saw her recently she had been supplementing the patch with acetaminophen and still struggling with the amount of pain.  I recently recommended that we increase the patch to the maximum dose of 20 mcg/72 hours.  This dose has been declined by her insurance carrier.    It is my opinion that this would be a safe dose and much safer than returning to the use of chronic opioids in hopes of controlling her pain.  This letter is written in appeal being that the prior authorization was denied.  Please see what you can do to help Ms. Rojas keep safe and comfortable.    Thank you for your consideration,        TAYLER Chaudhry

## 2023-06-05 DIAGNOSIS — G89.29 CHRONIC BACK PAIN, UNSPECIFIED BACK LOCATION, UNSPECIFIED BACK PAIN LATERALITY: ICD-10-CM

## 2023-06-05 DIAGNOSIS — M54.2 CHRONIC NECK PAIN: ICD-10-CM

## 2023-06-05 DIAGNOSIS — M54.9 CHRONIC BACK PAIN, UNSPECIFIED BACK LOCATION, UNSPECIFIED BACK PAIN LATERALITY: ICD-10-CM

## 2023-06-05 DIAGNOSIS — G89.29 CHRONIC NECK PAIN: ICD-10-CM

## 2023-06-05 RX ORDER — BUPRENORPHINE 15 UG/H
1 PATCH TRANSDERMAL
Qty: 1 PATCH | Refills: 3 | Status: SHIPPED | OUTPATIENT
Start: 2023-06-05 | End: 2023-06-05

## 2023-06-05 NOTE — TELEPHONE ENCOUNTER
Pharmacy calling because they won't break up the buprenorphine box, So please write for 4 patches and 0 refills. Pt stated that pt is fine staying on the lower strength for another month.

## 2023-06-05 NOTE — TELEPHONE ENCOUNTER
Pt is hoping that Loraine Buchanan will Appeal the 20mcg Butrans Rx.    While waiting for this to be submitted; pt would like to know if Loraine would send in 1 wk supply of the 15mcg Butrans to bridge patient till this is resolved.  Please advise/address.

## 2023-06-06 RX ORDER — BUPRENORPHINE 15 UG/H
1 PATCH TRANSDERMAL
Qty: 4 PATCH | Refills: 0 | Status: SHIPPED | OUTPATIENT
Start: 2023-06-06 | End: 2023-09-28

## 2023-06-06 NOTE — TELEPHONE ENCOUNTER
PRIOR AUTHORIZATION DENIED    Medication: BUPRENORPHINE 20 MCG/HR TD PTWK  Insurance Company: [employer] - Phone [phone] Fax [phone]  Denial Date: 6/3/2023  Denial Rational:     Appeal Information:     Patient Notified: No

## 2023-06-06 NOTE — TELEPHONE ENCOUNTER
Appeal letter was faxed to [facility] - Appeals at [phone]. Confirmation was received.

## 2023-06-06 NOTE — TELEPHONE ENCOUNTER
An letter has been faxed to the [facility] [employer] - Appeals office at [phone]. Confirmation was received. We will now wait for there response.

## 2023-06-06 NOTE — TELEPHONE ENCOUNTER
Called and LVM for pt to call back and speak w/someone on Loraine's careteam ([place]) re: approval from [facility] on butrans Rx.

## 2023-06-26 ENCOUNTER — TELEPHONE (OUTPATIENT)
Dept: FAMILY MEDICINE | Facility: CLINIC | Age: 58
End: 2023-06-26
Payer: COMMERCIAL

## 2023-06-26 NOTE — TELEPHONE ENCOUNTER
Reason for Call:  Appointment Request    Patient requesting this type of appt:  Office visit    Requested provider: Loraine Buchanan    Reason patient unable to be scheduled: Not within requested timeframe    When does patient want to be seen/preferred time: 1-2 days    Comments: Patient has an appointment scheduled on Wednesday 06/28/2023 but would like to come in on Friday 06/30/2023 with primary doctor if possible, Patient is available anytime    Could we send this information to you in [facility] or would you prefer to receive a phone call?:   No preference   Okay to leave a detailed message?: Yes at Cell number on file:    Telephone Information:   Mobile [phone]       Call taken on 6/26/2023 at 4:03 PM by Kiera Ying

## 2023-06-26 NOTE — TELEPHONE ENCOUNTER
Spoke with patient and was able to schedule an appt for her on Thursday 06/29/2023 with Loraine Buchanan. Her appt for Wednesday has been canceled.

## 2023-06-29 ENCOUNTER — OFFICE VISIT (OUTPATIENT)
Dept: FAMILY MEDICINE | Facility: CLINIC | Age: 58
End: 2023-06-29
Payer: COMMERCIAL

## 2023-06-29 VITALS
HEIGHT: 66 IN | DIASTOLIC BLOOD PRESSURE: 76 MMHG | SYSTOLIC BLOOD PRESSURE: 114 MMHG | HEART RATE: 77 BPM | OXYGEN SATURATION: 98 % | WEIGHT: 199 LBS | RESPIRATION RATE: 16 BRPM | TEMPERATURE: 97.8 F | BODY MASS INDEX: 31.98 KG/M2

## 2023-06-29 DIAGNOSIS — G89.29 CHRONIC BACK PAIN, UNSPECIFIED BACK LOCATION, UNSPECIFIED BACK PAIN LATERALITY: ICD-10-CM

## 2023-06-29 DIAGNOSIS — M54.2 CHRONIC NECK PAIN: Primary | ICD-10-CM

## 2023-06-29 DIAGNOSIS — G89.29 CHRONIC NECK PAIN: Primary | ICD-10-CM

## 2023-06-29 DIAGNOSIS — M70.21 OLECRANON BURSITIS, RIGHT ELBOW: ICD-10-CM

## 2023-06-29 DIAGNOSIS — F33.1 MAJOR DEPRESSIVE DISORDER, RECURRENT EPISODE, MODERATE (H): ICD-10-CM

## 2023-06-29 DIAGNOSIS — M54.9 CHRONIC BACK PAIN, UNSPECIFIED BACK LOCATION, UNSPECIFIED BACK PAIN LATERALITY: ICD-10-CM

## 2023-06-29 LAB
AMPHETAMINES UR QL: DETECTED
BARBITURATES UR QL SCN: NOT DETECTED
BENZODIAZ UR QL SCN: NOT DETECTED
BUPRENORPHINE UR QL: DETECTED
CANNABINOIDS UR QL: NOT DETECTED
COCAINE UR QL SCN: NOT DETECTED
D-METHAMPHET UR QL: NOT DETECTED
METHADONE UR QL SCN: NOT DETECTED
OPIATES UR QL SCN: NOT DETECTED
OXYCODONE UR QL SCN: NOT DETECTED
PCP UR QL SCN: NOT DETECTED
PROPOXYPH UR QL: NOT DETECTED
TRICYCLICS UR QL SCN: NOT DETECTED

## 2023-06-29 PROCEDURE — 80306 DRUG TEST PRSMV INSTRMNT: CPT | Performed by: NURSE PRACTITIONER

## 2023-06-29 PROCEDURE — 99214 OFFICE O/P EST MOD 30 MIN: CPT | Performed by: NURSE PRACTITIONER

## 2023-06-29 RX ORDER — BUPRENORPHINE 15 UG/H
1 PATCH TRANSDERMAL
Qty: 4 PATCH | Refills: 5 | Status: CANCELLED | OUTPATIENT
Start: 2023-06-29

## 2023-06-29 RX ORDER — BUPRENORPHINE 20 UG/H
1 PATCH TRANSDERMAL
Qty: 4 PATCH | Refills: 3 | Status: SHIPPED | OUTPATIENT
Start: 2023-06-29 | End: 2023-09-28

## 2023-06-29 RX ORDER — PREDNISONE 20 MG/1
20 TABLET ORAL DAILY
Qty: 7 TABLET | Refills: 0 | Status: SHIPPED | OUTPATIENT
Start: 2023-06-29 | End: 2023-09-28

## 2023-06-29 NOTE — PROGRESS NOTES
Assessment & Plan     (M54.2,  G89.29) Chronic neck pain  (primary encounter diagnosis)  Comment: She has been on Butrans 15 mcg patches and recently it was approved at the 20 mcg level, I had prescribed that a few months ago and it took a while for approval but she is hoping this will be more effective in helping with pain.  I sent prescription for 3 or 4 months and she should follow-up when she runs out  Plan: Urine Drugs of Abuse Screen, CANCELED: [id number]         - Urine Drug Confirmation Panel (Comprehensive)            (F33.1) Major depressive disorder, recurrent episode, moderate (H)  Comment: This is well controlled, this diagnosis is placed as a best practice alert.  She does not need any meds at this time  Plan:     (M70.21) Olecranon bursitis, right elbow  Comment: Does not look septic, she cannot use NSAIDs, advised short course of prednisone with ice 15 minutes and a elbow protector.  Expect gradual resolution over the course of a month and if it gets hot or she is feverish or simply does not resolve she should seek follow-up with orthopedics  Plan: predniSONE (DELTASONE) 20 MG tablet            (M54.9,  G89.29) Chronic back pain, unspecified back location, unspecified back pain laterality  Comment: As above  Plan: buprenorphine (BUTRANS) 20 MCG/HR WK patch,         Urine Drugs of Abuse Screen, CANCELED: [id number]         - Urine Drug Confirmation Panel (Comprehensive)                           Loraine Buchanan NP  [facility]    Cari Soler is a 58 year old, presenting for the following health issues:    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  She notes swelling on the right elbow 5 days ago and it seems to be worsening.  Is not red or hot, she has no fever, she has not had this before, she is not using anything over-the-counter for this right now.  In further conversation she shares that they recently got a dog that requires  "leash training and does a lot of pulling.  It is possible this new activity is what has triggered the problem if there is rubbing of the elbow clothing or anything rough.  She will try to modify the way she walks the dog and use the elbow support and ice as we discussed today    Also addressed chronic pain.  She has chronic neck and back pain due to primary osteoarthritis.  She sustained a GI bleed while on NSAIDs coping with this.  She had been on narcotics for a while to cope with the pain and this past year I asked her to consider Butrans and she been very willing to do this and but appropriate with use.  We need to increase her dose to 20 mcg weekly and finally have gotten approval for insurance so a prescription was sent for that today.  She is due for urine drug test, controlled substance agreement is up-to-date.  She will follow-up in the fall and let me know how she is doing with health  Elbow Problem (Swelling in Right)        6/29/2023    10:19 AM   Additional Questions   Roomed by Ella     Elbow Pain    History of Present Illness       Reason for visit:  Fluid filled bump on right elbow just appeared  Symptom onset:  3-7 days ago  Symptoms include:  Bump on elbow, mild ache, no redness or warmth  Symptom intensity:  Mild  Symptom progression:  Staying the same  Had these symptoms before:  No  What makes it worse:  Pressure on elbow - slight increase aches  What makes it better:  No    She eats 2-3 servings of fruits and vegetables daily.She consumes 3 sweetened beverage(s) daily.She exercises with enough effort to increase her heart rate 20 to 29 minutes per day.  She exercises with enough effort to increase her heart rate 5 days per week.   She is taking medications regularly.               Review of Systems         Objective    /76 (BP Location: Left arm, Patient Position: Sitting, Cuff Size: Adult Large)   Pulse 77   Temp 97.8  F (36.6  C) (Temporal)   Resp 16   Ht 1.683 m (5' 6.25\")   Wt " 90.3 kg (199 lb)   LMP  (LMP Unknown)   SpO2 98%   BMI 31.88 kg/m    Body mass index is 31.88 kg/m .  Physical Exam   GENERAL: healthy, alert and no distress  NECK: no adenopathy, no asymmetry, masses, or scars and thyroid normal to palpation  RESP: lungs clear to auscultation - no rales, rhonchi or wheezes  CV: regular rate and rhythm, normal S1 S2, no S3 or S4, no murmur, click or rub, no peripheral edema and peripheral pulses strong  MS: no gross musculoskeletal defects noted, no edema

## 2023-07-30 ENCOUNTER — MYC REFILL (OUTPATIENT)
Dept: FAMILY MEDICINE | Facility: CLINIC | Age: 58
End: 2023-07-30
Payer: COMMERCIAL

## 2023-07-30 DIAGNOSIS — F98.8 ATTENTION DEFICIT DISORDER (ADD) WITHOUT HYPERACTIVITY: ICD-10-CM

## 2023-07-31 DIAGNOSIS — F98.8 ATTENTION DEFICIT DISORDER (ADD) WITHOUT HYPERACTIVITY: ICD-10-CM

## 2023-07-31 RX ORDER — LISDEXAMFETAMINE DIMESYLATE 50 MG
50 CAPSULE ORAL EVERY MORNING
Qty: 30 CAPSULE | Refills: 0 | Status: SHIPPED | OUTPATIENT
Start: 2023-08-30 | End: 2023-11-03

## 2023-07-31 RX ORDER — LISDEXAMFETAMINE DIMESYLATE 50 MG
50 CAPSULE ORAL EVERY MORNING
Qty: 30 CAPSULE | Refills: 0 | Status: SHIPPED | OUTPATIENT
Start: 2023-07-31 | End: 2023-09-28

## 2023-07-31 RX ORDER — LISDEXAMFETAMINE DIMESYLATE 50 MG
CAPSULE ORAL
Qty: 30 CAPSULE | Refills: 0 | OUTPATIENT
Start: 2023-07-31

## 2023-07-31 NOTE — TELEPHONE ENCOUNTER
Last office visit: 6/29/2023     Future Appointments 7/31/2023 - 1/27/2024      None            Requested Prescriptions   Pending Prescriptions Disp Refills    VYVANSE 50 MG capsule 30 capsule 0     Sig: Take 1 capsule (50 mg) by mouth every morning       There is no refill protocol information for this order

## 2023-09-19 DIAGNOSIS — I10 PRIMARY HYPERTENSION: ICD-10-CM

## 2023-09-20 RX ORDER — AMLODIPINE BESYLATE 5 MG/1
5 TABLET ORAL DAILY
Qty: 90 TABLET | Refills: 2 | Status: SHIPPED | OUTPATIENT
Start: 2023-09-20 | End: 2024-06-05

## 2023-09-26 ENCOUNTER — MYC REFILL (OUTPATIENT)
Dept: FAMILY MEDICINE | Facility: CLINIC | Age: 58
End: 2023-09-26
Payer: COMMERCIAL

## 2023-09-26 ENCOUNTER — MYC MEDICAL ADVICE (OUTPATIENT)
Dept: FAMILY MEDICINE | Facility: CLINIC | Age: 58
End: 2023-09-26
Payer: COMMERCIAL

## 2023-09-26 DIAGNOSIS — F98.8 ATTENTION DEFICIT DISORDER (ADD) WITHOUT HYPERACTIVITY: ICD-10-CM

## 2023-09-26 RX ORDER — LISDEXAMFETAMINE DIMESYLATE 50 MG
50 CAPSULE ORAL EVERY MORNING
Qty: 30 CAPSULE | Refills: 0 | OUTPATIENT
Start: 2023-09-26

## 2023-09-26 NOTE — TELEPHONE ENCOUNTER
Please see patient MyChart.    Patient is questioning why Vyvanse was denied today for refill.  No note in chart for rationale:    Last RX: 8/30/2023  LOV: 6/29/2023    Please advise.

## 2023-09-26 NOTE — TELEPHONE ENCOUNTER
According to [facility] I sent a prescription for Vyvanse 50 mg on July 31 and it was filled July 31.  I also sent a prescription on July 31 for Vyvanse 50 mg to be filled on August 30.  So she should have a refill that could have been filled August 30 at the pharmacy.  Neither of those prescriptions are showing up in the PDMP so I cannot verify any of that.  She is on my schedule in 2 days so we can address it at that time.

## 2023-09-28 ENCOUNTER — TELEPHONE (OUTPATIENT)
Dept: FAMILY MEDICINE | Facility: CLINIC | Age: 58
End: 2023-09-28

## 2023-09-28 ENCOUNTER — VIRTUAL VISIT (OUTPATIENT)
Dept: FAMILY MEDICINE | Facility: CLINIC | Age: 58
End: 2023-09-28
Payer: COMMERCIAL

## 2023-09-28 DIAGNOSIS — M54.9 CHRONIC BACK PAIN, UNSPECIFIED BACK LOCATION, UNSPECIFIED BACK PAIN LATERALITY: Primary | ICD-10-CM

## 2023-09-28 DIAGNOSIS — F98.8 ATTENTION DEFICIT DISORDER (ADD) WITHOUT HYPERACTIVITY: ICD-10-CM

## 2023-09-28 DIAGNOSIS — G89.29 CHRONIC BACK PAIN, UNSPECIFIED BACK LOCATION, UNSPECIFIED BACK PAIN LATERALITY: Primary | ICD-10-CM

## 2023-09-28 DIAGNOSIS — F33.1 MAJOR DEPRESSIVE DISORDER, RECURRENT EPISODE, MODERATE (H): ICD-10-CM

## 2023-09-28 PROCEDURE — 99214 OFFICE O/P EST MOD 30 MIN: CPT | Mod: VID | Performed by: NURSE PRACTITIONER

## 2023-09-28 RX ORDER — LISDEXAMFETAMINE DIMESYLATE 40 MG/1
CAPSULE ORAL
Qty: 7 CAPSULE | Refills: 0 | Status: SHIPPED | OUTPATIENT
Start: 2023-09-28 | End: 2023-11-03

## 2023-09-28 RX ORDER — TRAMADOL HYDROCHLORIDE 50 MG/1
50 TABLET ORAL DAILY PRN
Qty: 10 TABLET | Refills: 0 | Status: SHIPPED | OUTPATIENT
Start: 2023-09-28 | End: 2023-10-25

## 2023-09-28 RX ORDER — LISDEXAMFETAMINE DIMESYLATE 20 MG/1
20 CAPSULE ORAL EVERY MORNING
Qty: 7 CAPSULE | Refills: 0 | Status: SHIPPED | OUTPATIENT
Start: 2023-09-28 | End: 2023-11-03

## 2023-09-28 RX ORDER — LISDEXAMFETAMINE DIMESYLATE 30 MG/1
30 CAPSULE ORAL EVERY MORNING
Qty: 7 CAPSULE | Refills: 0 | Status: SHIPPED | OUTPATIENT
Start: 2023-09-28 | End: 2023-11-03

## 2023-09-28 RX ORDER — LISDEXAMFETAMINE DIMESYLATE 10 MG/1
10 CAPSULE ORAL EVERY MORNING
Qty: 7 CAPSULE | Refills: 0 | Status: SHIPPED | OUTPATIENT
Start: 2023-09-28 | End: 2023-11-03

## 2023-09-28 RX ORDER — BUPROPION HYDROCHLORIDE 100 MG/1
100 TABLET, EXTENDED RELEASE ORAL DAILY
Qty: 90 TABLET | Refills: 0 | Status: SHIPPED | OUTPATIENT
Start: 2023-09-28 | End: 2024-01-22

## 2023-09-28 NOTE — PROGRESS NOTES
"Karlene is a 58 year old who is being evaluated via a billable video visit.      How would you like to obtain your AVS? MyChart  If the video visit is dropped, the invitation should be resent by: Text to cell phone: [phone]  Will anyone else be joining your video visit? No          Assessment & Plan     (M54.9,  G89.29) Chronic back pain, unspecified back location, unspecified back pain laterality  (primary encounter diagnosis)  Comment:   Plan: traMADol (ULTRAM) 50 MG tablet            (F98.8) Attention deficit disorder (ADD) without hyperactivity  Comment:   Plan: lisdexamfetamine (VYVANSE) 40 MG capsule,         lisdexamfetamine (VYVANSE) 30 MG capsule,         lisdexamfetamine (VYVANSE) 20 MG capsule,         lisdexamfetamine (VYVANSE) 10 MG capsule            (F33.1) Major depressive disorder, recurrent episode, moderate (H)  Comment:   Plan: buPROPion (WELLBUTRIN SR) 100 MG 12 hr tablet            She is trying to stop some medications due to side effects.  Depression--will switch to SR formultion wellbutrin, only in the morning  ADD --I informed her that tapers are not necessary with stimulants and that many people pick and choose what to use a stimulant or not use, but she feels she would do very poorly if she stopped this medication suddenly as she also uses for a binge eating disorder I agreed to taper over 1 omnth  Chronic pain--she has stopped the butrans 1 month ago and wants to use strictly Tylenol with a rare (maybe 7 -10 pills per month) of Tramadol.  She can reach out to me in a month, or longer when this current supply runs out    She will be due to see me in a month for chronic disease follow up             BMI:   Estimated body mass index is 31.88 kg/m  as calculated from the following:    Height as of 6/29/23: 1.683 m (5' 6.25\").    Weight as of 6/29/23: 90.3 kg (199 lb).           Loraine Buchanan NP  [facility]    Cari Soler is a 58 year old, presenting for " "the following health issues:  Would like to taper off Vyvanse over the next few months.  She has been using it for binge eating, not for focus.  She never misses a dose    Stopped her Butrans 20mg 4 weeks ago, the patch was itchy/rash, wanted to try and do without it.  Using Tylenol 1000mg tid,   Was using tramadol prior to butrans at 2 tabs per day, believes she could get by with just a few each month for break thru pain    She is not sleeping well at night--hard to fall asleep and stay asleep  Uses Wellbutrin and Lexapro  Recheck Medication      9/28/2023    12:08 PM   Additional Questions   Roomed by Gabriela GREEN CMA   Accompanied by None       Medication Followup   Taking Medication as prescribed: yes  Medication Helping Symptoms:  Wants to discuss another \"plan\"        Review of Systems         Objective           Vitals:  No vitals were obtained today due to virtual visit.    Physical Exam   GENERAL: Healthy, alert and no distress  EYES: Eyes grossly normal to inspection.  No discharge or erythema, or obvious scleral/conjunctival abnormalities.  RESP: No audible wheeze, cough, or visible cyanosis.  No visible retractions or increased work of breathing.    SKIN: Visible skin clear. No significant rash, abnormal pigmentation or lesions.  NEURO: Cranial nerves grossly intact.  Mentation and speech appropriate for age.  PSYCH: Mentation appears normal, affect normal/bright, judgement and insight intact, normal speech and appearance well-groomed.                Video-Visit Details    Type of service:  Video Visit     Originating Location (pt. Location): Home    Distant Location (provider location):  On-site  Platform used for Video Visit: Jenelle"

## 2023-09-28 NOTE — TELEPHONE ENCOUNTER
General Call    Reason for Call: Please call back [facility]    What are your questions or concerns:  Pharmacy just wanted to check to see if Loraine Buchanan wanted Patient to take Vyvanse 40 MG 1 tablet daily because it wasn't stated on Rx    Please call back [facility] - [phone]  
I called and spoke with pharmacy, informing them of taper. Encounter closed.   
64

## 2023-10-25 ENCOUNTER — MYC REFILL (OUTPATIENT)
Dept: FAMILY MEDICINE | Facility: CLINIC | Age: 58
End: 2023-10-25
Payer: COMMERCIAL

## 2023-10-25 DIAGNOSIS — G89.29 CHRONIC BACK PAIN, UNSPECIFIED BACK LOCATION, UNSPECIFIED BACK PAIN LATERALITY: ICD-10-CM

## 2023-10-25 DIAGNOSIS — M54.9 CHRONIC BACK PAIN, UNSPECIFIED BACK LOCATION, UNSPECIFIED BACK PAIN LATERALITY: ICD-10-CM

## 2023-10-27 RX ORDER — TRAMADOL HYDROCHLORIDE 50 MG/1
50 TABLET ORAL DAILY PRN
Qty: 10 TABLET | Refills: 0 | Status: SHIPPED | OUTPATIENT
Start: 2023-10-27 | End: 2023-11-03

## 2023-11-03 ENCOUNTER — OFFICE VISIT (OUTPATIENT)
Dept: FAMILY MEDICINE | Facility: CLINIC | Age: 58
End: 2023-11-03
Payer: COMMERCIAL

## 2023-11-03 VITALS
WEIGHT: 201.4 LBS | TEMPERATURE: 97.7 F | OXYGEN SATURATION: 97 % | BODY MASS INDEX: 32.37 KG/M2 | HEART RATE: 88 BPM | HEIGHT: 66 IN | DIASTOLIC BLOOD PRESSURE: 74 MMHG | SYSTOLIC BLOOD PRESSURE: 130 MMHG

## 2023-11-03 DIAGNOSIS — M54.9 CHRONIC BACK PAIN, UNSPECIFIED BACK LOCATION, UNSPECIFIED BACK PAIN LATERALITY: ICD-10-CM

## 2023-11-03 DIAGNOSIS — R63.5 WEIGHT GAIN: ICD-10-CM

## 2023-11-03 DIAGNOSIS — E66.811 CLASS 1 OBESITY DUE TO EXCESS CALORIES WITHOUT SERIOUS COMORBIDITY WITH BODY MASS INDEX (BMI) OF 33.0 TO 33.9 IN ADULT: Primary | ICD-10-CM

## 2023-11-03 DIAGNOSIS — E66.09 CLASS 1 OBESITY DUE TO EXCESS CALORIES WITHOUT SERIOUS COMORBIDITY WITH BODY MASS INDEX (BMI) OF 33.0 TO 33.9 IN ADULT: Primary | ICD-10-CM

## 2023-11-03 DIAGNOSIS — F51.02 TRANSIENT INSOMNIA: ICD-10-CM

## 2023-11-03 DIAGNOSIS — I10 PRIMARY HYPERTENSION: ICD-10-CM

## 2023-11-03 DIAGNOSIS — G89.29 CHRONIC BACK PAIN, UNSPECIFIED BACK LOCATION, UNSPECIFIED BACK PAIN LATERALITY: ICD-10-CM

## 2023-11-03 DIAGNOSIS — G43.901 MIGRAINE WITH STATUS MIGRAINOSUS, NOT INTRACTABLE, UNSPECIFIED MIGRAINE TYPE: ICD-10-CM

## 2023-11-03 DIAGNOSIS — F41.1 GENERALIZED ANXIETY DISORDER: ICD-10-CM

## 2023-11-03 LAB
ANION GAP SERPL CALCULATED.3IONS-SCNC: 11 MMOL/L (ref 7–15)
BUN SERPL-MCNC: 11.5 MG/DL (ref 6–20)
CALCIUM SERPL-MCNC: 9.2 MG/DL (ref 8.6–10)
CHLORIDE SERPL-SCNC: 105 MMOL/L (ref 98–107)
CREAT SERPL-MCNC: 0.86 MG/DL (ref 0.51–0.95)
DEPRECATED HCO3 PLAS-SCNC: 25 MMOL/L (ref 22–29)
EGFRCR SERPLBLD CKD-EPI 2021: 79 ML/MIN/1.73M2
GLUCOSE SERPL-MCNC: 84 MG/DL (ref 70–99)
HBA1C MFR BLD: 5.4 % (ref 0–5.6)
POTASSIUM SERPL-SCNC: 3.9 MMOL/L (ref 3.4–5.3)
SODIUM SERPL-SCNC: 141 MMOL/L (ref 135–145)

## 2023-11-03 PROCEDURE — 80048 BASIC METABOLIC PNL TOTAL CA: CPT | Performed by: NURSE PRACTITIONER

## 2023-11-03 PROCEDURE — 96127 BRIEF EMOTIONAL/BEHAV ASSMT: CPT | Performed by: NURSE PRACTITIONER

## 2023-11-03 PROCEDURE — 99214 OFFICE O/P EST MOD 30 MIN: CPT | Performed by: NURSE PRACTITIONER

## 2023-11-03 PROCEDURE — 36415 COLL VENOUS BLD VENIPUNCTURE: CPT | Performed by: NURSE PRACTITIONER

## 2023-11-03 PROCEDURE — 83036 HEMOGLOBIN GLYCOSYLATED A1C: CPT | Performed by: NURSE PRACTITIONER

## 2023-11-03 RX ORDER — TRAZODONE HYDROCHLORIDE 50 MG/1
50 TABLET, FILM COATED ORAL AT BEDTIME
Qty: 30 TABLET | Refills: 3 | Status: SHIPPED | OUTPATIENT
Start: 2023-11-03 | End: 2024-03-25

## 2023-11-03 RX ORDER — ACETAMINOPHEN 500 MG
1000 TABLET ORAL 3 TIMES DAILY
COMMUNITY

## 2023-11-03 RX ORDER — TRAMADOL HYDROCHLORIDE 50 MG/1
50 TABLET ORAL DAILY PRN
Qty: 30 TABLET | Refills: 5 | Status: SHIPPED | OUTPATIENT
Start: 2023-11-03 | End: 2024-06-05

## 2023-11-03 RX ORDER — ZOLMITRIPTAN 5 MG/1
TABLET, ORALLY DISINTEGRATING ORAL
Status: CANCELLED | DISCHARGE
Start: 2023-11-03

## 2023-11-03 RX ORDER — ESCITALOPRAM OXALATE 20 MG/1
TABLET ORAL
Qty: 90 TABLET | Refills: 3 | Status: SHIPPED | OUTPATIENT
Start: 2023-11-03

## 2023-11-03 ASSESSMENT — ANXIETY QUESTIONNAIRES
GAD7 TOTAL SCORE: 0
1. FEELING NERVOUS, ANXIOUS, OR ON EDGE: NOT AT ALL
GAD7 TOTAL SCORE: 0
6. BECOMING EASILY ANNOYED OR IRRITABLE: NOT AT ALL
4. TROUBLE RELAXING: NOT AT ALL
3. WORRYING TOO MUCH ABOUT DIFFERENT THINGS: NOT AT ALL
2. NOT BEING ABLE TO STOP OR CONTROL WORRYING: NOT AT ALL
7. FEELING AFRAID AS IF SOMETHING AWFUL MIGHT HAPPEN: NOT AT ALL
IF YOU CHECKED OFF ANY PROBLEMS ON THIS QUESTIONNAIRE, HOW DIFFICULT HAVE THESE PROBLEMS MADE IT FOR YOU TO DO YOUR WORK, TAKE CARE OF THINGS AT HOME, OR GET ALONG WITH OTHER PEOPLE: SOMEWHAT DIFFICULT
5. BEING SO RESTLESS THAT IT IS HARD TO SIT STILL: NOT AT ALL

## 2023-11-03 ASSESSMENT — PATIENT HEALTH QUESTIONNAIRE - PHQ9
10. IF YOU CHECKED OFF ANY PROBLEMS, HOW DIFFICULT HAVE THESE PROBLEMS MADE IT FOR YOU TO DO YOUR WORK, TAKE CARE OF THINGS AT HOME, OR GET ALONG WITH OTHER PEOPLE: SOMEWHAT DIFFICULT
SUM OF ALL RESPONSES TO PHQ QUESTIONS 1-9: 9
SUM OF ALL RESPONSES TO PHQ QUESTIONS 1-9: 9

## 2023-11-03 NOTE — PROGRESS NOTES
"  Assessment & Plan     (E66.09,  Z68.33) Class 1 obesity due to excess calories without serious comorbidity with body mass index (BMI) of 33.0 to 33.9 in adult  (primary encounter diagnosis)  Comment: as below  Plan:     (M54.9,  G89.29) Chronic back pain, unspecified back location, unspecified back pain laterality  Comment:   Plan: REVIEW OF HEALTH MAINTENANCE PROTOCOL ORDERS,         traMADol (ULTRAM) 50 MG tablet        See me in 6 months, continue listed remedies    (G43.901) Migraine with status migrainosus, not intractable, unspecified migraine type  Comment: controlled  Plan: controlled    (F41.1) Generalized anxiety disorder  Comment: controlled with this and wellbutrin continue  Plan: escitalopram (LEXAPRO) 20 MG tablet            (Z68.32) BMI 32.0-32.9,adult  Comment:   Plan: Nutrition Referral, Hemoglobin A1c        See RD    (R63.5) Weight gain  Comment:   Plan: Nutrition Referral, Hemoglobin A1c            (F51.02) Transient insomnia  Comment:   Plan: traZODone (DESYREL) 50 MG tablet        Return to trazodone, doubt sr wellbutrin is contributint to this    (I10) Primary hypertension  Comment:   Plan: Basic metabolic panel        controlled             BMI:   Estimated body mass index is 32.26 kg/m  as calculated from the following:    Height as of this encounter: 1.683 m (5' 6.25\").    Weight as of this encounter: 91.4 kg (201 lb 6.4 oz).           Loraine Buchanan NP  [facility]    Cari Soler is a 58 year old, presenting for the following health issues: following up on medication changes, changed Wellbutrin to SR formulation, tapered off Vyvanse, off Butrans since 08/2023    She has tapered off the vyvanse, has gained #5 and feels like she is binge eating again. I asked her to consider other medications for appetite suppression, she will see Fadumo Kang RD    She has cut back on wellbutrin to SR , still sleep problems.  Has liked the wellbutrin as it compliments the " lexapro.  More sadness without it.  Did use trazodone in past for sleep. Needs refill    She is using Tylenol ES tid, neck and back are always sore. Doing stretches.  Using 1 tramadol a day.  Also using heat, takes dog for walk, moves frequently. Will continue this routine, CSA is UTD    HTN well controlled, due for lab work.       Follow Up and Recheck Medication        11/3/2023     7:47 AM   Additional Questions   Roomed by priscilla simmons   Accompanied by self         History of Present Illness       Back Pain:  She presents for follow up of back pain. Patient's back pain is a chronic problem.  Location of back pain:  Right lower back, left lower back, right side of neck and left side of neck  Description of back pain: dull ache and gnawing  Back pain spreads: nowhere    Since patient first noticed back pain, pain is: always present, but gets better and worse  Does back pain interfere with her job:  Yes       Hyperlipidemia:  She presents for follow up of hyperlipidemia.   She is not taking medication to lower cholesterol. She is not having myalgia or other side effects to statin medications.    Hypertension: She presents for follow up of hypertension.  She does check blood pressure  regularly outside of the clinic. Outpatient blood pressures have not been over 140/90. She follows a low salt diet.     She eats 2-3 servings of fruits and vegetables daily.She consumes 4 sweetened beverage(s) daily.She exercises with enough effort to increase her heart rate 20 to 29 minutes per day.  She exercises with enough effort to increase her heart rate 4 days per week.   She is taking medications regularly.     PATIENT HEALTH QUESTIONNAIRE-9 (PHQ - 9)    Over the last 2 weeks, how often have you been bothered by any of the following problems?    1. Little interest or pleasure in doing things -  Not at all   2. Feeling down, depressed, or hopeless -  Not at all   3. Trouble falling or staying asleep, or sleeping too much - More than  "half the days   4. Feeling tired or having little energy -  More than half the days   5. Poor appetite or overeating -  Nearly every day   6. Feeling bad about yourself - or that you are a failure or have let yourself or your family down -  Not at all   7. Trouble concentrating on things, such as reading the newspaper or watching television - More than half the days   8. Moving or speaking so slowly that other people could have noticed? Or the opposite - being so fidgety or restless that you have been moving around a lot more than usual Not at all   9. Thoughts that you would be better off dead or of hurting  yourself in some way Not at all   Total Score: 9     If you checked off any problems, how difficult have these problems made it for you to do your work, take care of things at home, or get along with other people?      Developed by Eloy Manuel, Naomi Marx, Andrea Feliciano and colleagues, with an educational nely from [employer] No permission required to reproduce, translate, display or distribute. permission required to reproduce, translate, display or distribute.     GAD7 score: 0     Hypertension Follow-up    Do you check your blood pressure regularly outside of the clinic? Yes   Are you following a low salt diet? Yes  Are your blood pressures ever more than 140 on the top number (systolic) OR more   than 90 on the bottom number (diastolic), for example 140/90? No        Review of Systems         Objective    /74 (BP Location: Right arm, Patient Position: Sitting)   Pulse 88   Temp 97.7  F (36.5  C)   Ht 1.683 m (5' 6.25\")   Wt 91.4 kg (201 lb 6.4 oz)   LMP  (LMP Unknown)   SpO2 97%   Breastfeeding No   BMI 32.26 kg/m    Body mass index is 32.26 kg/m .  Physical Exam                         "

## 2023-11-15 ENCOUNTER — OFFICE VISIT (OUTPATIENT)
Dept: EDUCATION SERVICES | Facility: CLINIC | Age: 58
End: 2023-11-15
Payer: COMMERCIAL

## 2023-11-15 VITALS — WEIGHT: 205.6 LBS | BODY MASS INDEX: 33.04 KG/M2 | HEIGHT: 66 IN

## 2023-11-15 DIAGNOSIS — R63.5 WEIGHT GAIN: ICD-10-CM

## 2023-11-15 PROCEDURE — 97803 MED NUTRITION INDIV SUBSEQ: CPT | Performed by: DIETITIAN, REGISTERED

## 2023-11-15 NOTE — PATIENT INSTRUCTIONS
Soluble Fiber 10+ grams/ day to help lower LDL cholesterol.      Calcium Citrate 500 -600 mg twice a day   Natures Made vit    Wegovy   Dose Escalation Schedule  Weeks    Weekly Dose  1 through 4   0.25 mg  5 through 8   0.5 mg  9 through 12   1 mg  13 through 16   1.7 mg  Week 17 and onward  2.4 mg Maintenance dose    Potentially going to [facility] in 2024        Non-food rewards

## 2023-11-15 NOTE — PROGRESS NOTES
Medical Nutrition Therapy  Visit Type:Reassessment and intervention    Angelique Rojas presents today for MNT and education related to weight management for obesity Body mass index is 32.93 kg/m .  She is accompanied by self.     ASSESSMENT:   Patient has tapered off Vyvanse (patient states it helped lose approximately 20 pounds lowest weight noted 3/2022 to 186 pounds, weight quite stable over the past year between 199 - 205#) now that patient has been off Vyvanse patient notes an increase in cravings and weight gain.  Patient is looking for nutrition education along with recommendations for alternative weight loss medication.  Patient comments/concerns relating to nutrition: Concerns with significant cravings especially sweets no certain time of day.  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  NUTRITION HISTORY:  Better about eating fruits and vegetables.  It does not consume a lot of dairy and will start taking a calcium supplement daily patient already takes vitamin  IU daily.  Patient follows a low-sodium meal plan.  Eating 3 meals per day.  Patient does consume lean proteins with meals twice a day but notes over eating is an issue.    Misses meals? no  Eats out:  1-2 meals/per week     Previous diet education:  Yes  2/2017    Food allergies/intolerances: No known food allergies    Diet is high in: calories  Diet is low in: fiber and vegetables    EXERCISE: Walking the dog 1-2 times per day  MEDICATIONS:  Current Outpatient Medications   Medication    Semaglutide-Weight Management (WEGOVY) 0.25 MG/0.5ML pen    acetaminophen (TYLENOL) 500 MG tablet    amLODIPine (NORVASC) 5 MG tablet    buPROPion (WELLBUTRIN SR) 100 MG 12 hr tablet    escitalopram (LEXAPRO) 20 MG tablet    MULTIVITAMIN TABS   OR    omeprazole (PRILOSEC) 20 MG DR capsule    traMADol (ULTRAM) 50 MG tablet    traZODone (DESYREL)  "50 MG tablet    VITAMIN D 1000 UNIT OR TABS    ZOLMitriptan (ZOMIG-ZMT) 5 MG ODT     No current facility-administered medications for this visit.       LABS:  Lab Results   Component Value Date     11/03/2023     10/28/2015      Lab Results   Component Value Date    POTASSIUM 3.9 11/03/2023    POTASSIUM 3.7 10/28/2021    POTASSIUM 3.8 10/28/2015     Lab Results   Component Value Date    CHLORIDE 105 11/03/2023    CHLORIDE 107 10/28/2021    CHLORIDE 103 10/28/2015     Lab Results   Component Value Date    KEKE 9.2 11/03/2023    KEKE 9.2 10/28/2015     Lab Results   Component Value Date    CO2 25 11/03/2023    CO2 27 10/28/2021    CO2 27 10/28/2015     Lab Results   Component Value Date    BUN 11.5 11/03/2023    BUN 16 10/28/2021    BUN 9 10/28/2015     Lab Results   Component Value Date    CR 0.86 11/03/2023    CR 0.72 10/28/2015     Lab Results   Component Value Date    GLC 84 11/03/2023    GLC 83 10/28/2021    GLC 74 10/28/2015     Lab Results   Component Value Date     03/09/2023     10/28/2015     HDL Cholesterol   Date Value Ref Range Status   10/28/2015 59 >50 mg/dL Final     Direct Measure HDL   Date Value Ref Range Status   03/09/2023 67 >=50 mg/dL Final   ]  GFR Estimate   Date Value Ref Range Status   11/03/2023 79 >60 mL/min/1.73m2 Final   10/28/2015 86 >60 mL/min/1.7m2 Final     Comment:     Non  GFR Calc     Lab Results   Component Value Date    CR 0.86 11/03/2023    CR 0.72 10/28/2015     No results found for: \"MICROALBUMIN\"    ANTHROPOMETRICS:  Vitals: Ht 1.683 m (5' 6.25\")   Wt 93.3 kg (205 lb 9.6 oz)   LMP  (LMP Unknown)   BMI 32.93 kg/m    Body mass index is 32.93 kg/m .      Wt Readings from Last 5 Encounters:   11/15/23 93.3 kg (205 lb 9.6 oz)   11/03/23 91.4 kg (201 lb 6.4 oz)   06/29/23 90.3 kg (199 lb)   06/02/23 91.3 kg (201 lb 3.2 oz)   03/09/23 92.3 kg (203 lb 6.4 oz)     ESTIMATED KCAL REQUIREMENTS:  1200 kcal per day    NUTRITION DIAGNOSIS: " Overweight/Obesity related to increased calorie intake as evidenced by BMI      NUTRITION INTERVENTION:  Nutrition Prescription: Energy Intake:  kcal/day  Education given to support: general nutrition guidelines, weight reduction, exercise and portion control  Education Materials Provided: My Plate Planner/Choose My Plate and Fiber Facts    Discussed what dietary changes has worked well for her in the past and how to incorporate them again.  Discussion on healthy behavior changes that can promote calorie reduction in diet.    We discussed why it is important to incorporate healthy lifestyle interventions to control overall health to prevent complications of being obese including the potential of developing diabetes and preventing heart disease.      Nutritional Psychiatry Your Brain on Food - discussion on how what you eat affects microbiome/ hormones and how you feel physically and emotionally.    Hunger/ Fullness cues - help identify how pt is feeling before, during, and after eating   Smart Snacking and 20 Ways to eat more fruit and vegetables.    Mindful eating - listening to body vs eating out of stress, boredom, emotion, eating to fuel body for strength and energy   Tracking food - balance of meals and snacks   Importance of daily physical activity as able to promote endorphin release       reduce stress, anxiety, and depression, improve sleep, increase energy level, improve muscle tone and strength ...   Recommend: Discussed options for medications and reduced calorie dietary guidelines.    Wegovy: proper use, mechanism of action, indications, dosing, injection schedule, safety and side effects.  Pt is shown a demonstration of the use of the pen and was able to return demonstration without difficulty.   Dose Escalation Schedule  Weeks                             Weekly Dose  1 through 4                      0.25 mg  5 through 8                      0.5 mg  9 through 12                    1 mg  13 through 16                   1.7 mg  Week 17 and onward      2.4 mg Maintenance dose    PATIENT'S BEHAVIOR CHANGE GOALS:   See Patient Instructions for patient stated behavior change goals. AVS was printed and given to patient at today's appointment.    MONITOR / EVALUATE:  RD will monitor/evaluate:  Pertinent Labs  Weight change    FOLLOW-UP:  Follow up with RD as needed.    Time spent in minutes: 30  Encounter: Individual

## 2023-11-15 NOTE — LETTER
11/15/2023         RE: Angelique Rojas  [address]  [facility] [place] [address]        Dear Colleague,    Thank you for referring your patient, Angelique Rojas, to the [facility]. Please see a copy of my visit note below.    Medical Nutrition Therapy  Visit Type:Reassessment and intervention    Angelique Rojas presents today for MNT and education related to weight management for obesity Body mass index is 32.93 kg/m .  She is accompanied by self.     ASSESSMENT:   Patient has tapered off Vyvanse (patient states it helped lose approximately 20 pounds lowest weight noted 3/2022 to 186 pounds, weight quite stable over the past year between 199 - 205#) now that patient has been off Vyvanse patient notes an increase in cravings and weight gain.  Patient is looking for nutrition education along with recommendations for alternative weight loss medication.  Patient comments/concerns relating to nutrition: Concerns with significant cravings especially sweets no certain time of day.  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  NUTRITION HISTORY:  Better about eating fruits and vegetables.  It does not consume a lot of dairy and will start taking a calcium supplement daily patient already takes vitamin  IU daily.  Patient follows a low-sodium meal plan.  Eating 3 meals per day.  Patient does consume lean proteins with meals twice a day but notes over eating is an issue.    Misses meals? no  Eats out:  1-2 meals/per week     Previous diet education:  Yes  2/2017    Food allergies/intolerances: No known food allergies    Diet is high in: calories  Diet is low in: fiber and vegetables    EXERCISE: Walking the dog 1-2 times per day  MEDICATIONS:  Current Outpatient Medications   Medication    Semaglutide-Weight Management (WEGOVY) 0.25 MG/0.5ML pen    acetaminophen (TYLENOL)  "500 MG tablet    amLODIPine (NORVASC) 5 MG tablet    buPROPion (WELLBUTRIN SR) 100 MG 12 hr tablet    escitalopram (LEXAPRO) 20 MG tablet    MULTIVITAMIN TABS   OR    omeprazole (PRILOSEC) 20 MG DR capsule    traMADol (ULTRAM) 50 MG tablet    traZODone (DESYREL) 50 MG tablet    VITAMIN D 1000 UNIT OR TABS    ZOLMitriptan (ZOMIG-ZMT) 5 MG ODT     No current facility-administered medications for this visit.       LABS:  Lab Results   Component Value Date     11/03/2023     10/28/2015      Lab Results   Component Value Date    POTASSIUM 3.9 11/03/2023    POTASSIUM 3.7 10/28/2021    POTASSIUM 3.8 10/28/2015     Lab Results   Component Value Date    CHLORIDE 105 11/03/2023    CHLORIDE 107 10/28/2021    CHLORIDE 103 10/28/2015     Lab Results   Component Value Date    KEKE 9.2 11/03/2023    KEKE 9.2 10/28/2015     Lab Results   Component Value Date    CO2 25 11/03/2023    CO2 27 10/28/2021    CO2 27 10/28/2015     Lab Results   Component Value Date    BUN 11.5 11/03/2023    BUN 16 10/28/2021    BUN 9 10/28/2015     Lab Results   Component Value Date    CR 0.86 11/03/2023    CR 0.72 10/28/2015     Lab Results   Component Value Date    GLC 84 11/03/2023    GLC 83 10/28/2021    GLC 74 10/28/2015     Lab Results   Component Value Date     03/09/2023     10/28/2015     HDL Cholesterol   Date Value Ref Range Status   10/28/2015 59 >50 mg/dL Final     Direct Measure HDL   Date Value Ref Range Status   03/09/2023 67 >=50 mg/dL Final   ]  GFR Estimate   Date Value Ref Range Status   11/03/2023 79 >60 mL/min/1.73m2 Final   10/28/2015 86 >60 mL/min/1.7m2 Final     Comment:     Non  GFR Calc     Lab Results   Component Value Date    CR 0.86 11/03/2023    CR 0.72 10/28/2015     No results found for: \"MICROALBUMIN\"    ANTHROPOMETRICS:  Vitals: Ht 1.683 m (5' 6.25\")   Wt 93.3 kg (205 lb 9.6 oz)   LMP  (LMP Unknown)   BMI 32.93 kg/m    Body mass index is 32.93 kg/m .      Wt Readings from Last 5 " Encounters:   11/15/23 93.3 kg (205 lb 9.6 oz)   11/03/23 91.4 kg (201 lb 6.4 oz)   06/29/23 90.3 kg (199 lb)   06/02/23 91.3 kg (201 lb 3.2 oz)   03/09/23 92.3 kg (203 lb 6.4 oz)     ESTIMATED KCAL REQUIREMENTS:  1200 kcal per day    NUTRITION DIAGNOSIS: Overweight/Obesity related to increased calorie intake as evidenced by BMI      NUTRITION INTERVENTION:  Nutrition Prescription: Energy Intake:  kcal/day  Education given to support: general nutrition guidelines, weight reduction, exercise and portion control  Education Materials Provided: My Plate Planner/Choose My Plate and Fiber Facts    Discussed what dietary changes has worked well for her in the past and how to incorporate them again.  Discussion on healthy behavior changes that can promote calorie reduction in diet.    We discussed why it is important to incorporate healthy lifestyle interventions to control overall health to prevent complications of being obese including the potential of developing diabetes and preventing heart disease.      Nutritional Psychiatry Your Brain on Food - discussion on how what you eat affects microbiome/ hormones and how you feel physically and emotionally.    Hunger/ Fullness cues - help identify how pt is feeling before, during, and after eating   Smart Snacking and 20 Ways to eat more fruit and vegetables.    Mindful eating - listening to body vs eating out of stress, boredom, emotion, eating to fuel body for strength and energy   Tracking food - balance of meals and snacks   Importance of daily physical activity as able to promote endorphin release       reduce stress, anxiety, and depression, improve sleep, increase energy level, improve muscle tone and strength ...   Recommend: Discussed options for medications and reduced calorie dietary guidelines.    Wegovy: proper use, mechanism of action, indications, dosing, injection schedule, safety and side effects.  Pt is shown a demonstration of the use of the pen and was able  to return demonstration without difficulty.   Dose Escalation Schedule  Weeks                             Weekly Dose  1 through 4                      0.25 mg  5 through 8                      0.5 mg  9 through 12                    1 mg  13 through 16                  1.7 mg  Week 17 and onward      2.4 mg Maintenance dose    PATIENT'S BEHAVIOR CHANGE GOALS:   See Patient Instructions for patient stated behavior change goals. AVS was printed and given to patient at today's appointment.    MONITOR / EVALUATE:  RD will monitor/evaluate:  Pertinent Labs  Weight change    FOLLOW-UP:  Follow up with RD as needed.    Time spent in minutes: 30  Encounter: Individual

## 2023-11-15 NOTE — LETTER
11/15/2023         RE: Angelique Rojas  [address]  [facility] [address]        Dear Colleague,    Thank you for referring your patient, Angelique Rojas, to the [facility]. Please see a copy of my visit note below.    Medical Nutrition Therapy  Visit Type:Reassessment and intervention    Angelique Rojas presents today for MNT and education related to weight management for obesity Body mass index is 32.93 kg/m .  She is accompanied by self.     ASSESSMENT:   Patient has tapered off Vyvanse (patient states it helped lose approximately 20 pounds lowest weight noted 3/2022 to 186 pounds, weight quite stable over the past year between 199 - 205#) now that patient has been off Vyvanse patient notes an increase in cravings and weight gain.  Patient is looking for nutrition education along with recommendations for alternative weight loss medication.  Patient comments/concerns relating to nutrition: Concerns with significant cravings especially sweets no certain time of day.  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  NUTRITION HISTORY:  Better about eating fruits and vegetables.  It does not consume a lot of dairy and will start taking a calcium supplement daily patient already takes vitamin  IU daily.  Patient follows a low-sodium meal plan.  Eating 3 meals per day.  Patient does consume lean proteins with meals twice a day but notes over eating is an issue.    Misses meals? no  Eats out:  1-2 meals/per week     Previous diet education:  Yes  2/2017    Food allergies/intolerances: No known food allergies    Diet is high in: calories  Diet is low in: fiber and vegetables    EXERCISE: Walking the dog 1-2 times per day  MEDICATIONS:  Current Outpatient Medications   Medication     Semaglutide-Weight Management (WEGOVY) 0.25 MG/0.5ML pen     acetaminophen (TYLENOL)  "500 MG tablet     amLODIPine (NORVASC) 5 MG tablet     buPROPion (WELLBUTRIN SR) 100 MG 12 hr tablet     escitalopram (LEXAPRO) 20 MG tablet     MULTIVITAMIN TABS   OR     omeprazole (PRILOSEC) 20 MG DR capsule     traMADol (ULTRAM) 50 MG tablet     traZODone (DESYREL) 50 MG tablet     VITAMIN D 1000 UNIT OR TABS     ZOLMitriptan (ZOMIG-ZMT) 5 MG ODT     No current facility-administered medications for this visit.       LABS:  Lab Results   Component Value Date     11/03/2023     10/28/2015      Lab Results   Component Value Date    POTASSIUM 3.9 11/03/2023    POTASSIUM 3.7 10/28/2021    POTASSIUM 3.8 10/28/2015     Lab Results   Component Value Date    CHLORIDE 105 11/03/2023    CHLORIDE 107 10/28/2021    CHLORIDE 103 10/28/2015     Lab Results   Component Value Date    KEKE 9.2 11/03/2023    KEKE 9.2 10/28/2015     Lab Results   Component Value Date    CO2 25 11/03/2023    CO2 27 10/28/2021    CO2 27 10/28/2015     Lab Results   Component Value Date    BUN 11.5 11/03/2023    BUN 16 10/28/2021    BUN 9 10/28/2015     Lab Results   Component Value Date    CR 0.86 11/03/2023    CR 0.72 10/28/2015     Lab Results   Component Value Date    GLC 84 11/03/2023    GLC 83 10/28/2021    GLC 74 10/28/2015     Lab Results   Component Value Date     03/09/2023     10/28/2015     HDL Cholesterol   Date Value Ref Range Status   10/28/2015 59 >50 mg/dL Final     Direct Measure HDL   Date Value Ref Range Status   03/09/2023 67 >=50 mg/dL Final   ]  GFR Estimate   Date Value Ref Range Status   11/03/2023 79 >60 mL/min/1.73m2 Final   10/28/2015 86 >60 mL/min/1.7m2 Final     Comment:     Non  GFR Calc     Lab Results   Component Value Date    CR 0.86 11/03/2023    CR 0.72 10/28/2015     No results found for: \"MICROALBUMIN\"    ANTHROPOMETRICS:  Vitals: Ht 1.683 m (5' 6.25\")   Wt 93.3 kg (205 lb 9.6 oz)   LMP  (LMP Unknown)   BMI 32.93 kg/m    Body mass index is 32.93 kg/m .      Wt Readings " from Last 5 Encounters:   11/15/23 93.3 kg (205 lb 9.6 oz)   11/03/23 91.4 kg (201 lb 6.4 oz)   06/29/23 90.3 kg (199 lb)   06/02/23 91.3 kg (201 lb 3.2 oz)   03/09/23 92.3 kg (203 lb 6.4 oz)     Wt Readings from Last 50 Encounters:   11/15/23 93.3 kg (205 lb 9.6 oz)   11/03/23 91.4 kg (201 lb 6.4 oz)   06/29/23 90.3 kg (199 lb)   06/02/23 91.3 kg (201 lb 3.2 oz)   03/09/23 92.3 kg (203 lb 6.4 oz)   11/14/22 90.3 kg (199 lb)   10/06/22 90.7 kg (200 lb)   07/18/22 88.7 kg (195 lb 8 oz)   03/30/22 84.5 kg (186 lb 3.2 oz)   10/28/21 88 kg (193 lb 14.4 oz)   09/10/21 88.1 kg (194 lb 3.2 oz)   02/04/19 98.2 kg (216 lb 6.4 oz)   10/12/18 93.4 kg (206 lb)   03/09/18 91.6 kg (202 lb)   08/28/17 89.4 kg (197 lb)   06/21/17 92.3 kg (203 lb 6.4 oz)   06/05/17 90.7 kg (200 lb)   02/20/17 92.1 kg (203 lb)   02/06/17 94.3 kg (208 lb)   06/15/16 95.5 kg (210 lb 9.6 oz)   10/28/15 87.1 kg (192 lb)   10/06/14 88 kg (194 lb)   09/23/13 85.3 kg (188 lb)   02/14/13 86.4 kg (190 lb 6.4 oz)   01/21/13 85.8 kg (189 lb 3.2 oz)   10/05/12 78 kg (172 lb)   04/12/12 72.1 kg (159 lb)   11/07/11 64.9 kg (143 lb)   11/04/11 64.9 kg (143 lb)   09/21/11 66.7 kg (147 lb)   06/29/11 68.9 kg (152 lb)   02/11/11 74.8 kg (165 lb)   11/05/10 78 kg (172 lb)   08/19/10 77.6 kg (171 lb)   04/09/09 76.2 kg (168 lb)   03/28/08 72.6 kg (160 lb)   10/26/07 72.1 kg (159 lb)   09/26/07 71.2 kg (157 lb)   07/13/07 68.9 kg (152 lb)   03/14/07 67.6 kg (149 lb)   12/21/06 66.7 kg (147 lb)   08/30/06 68.9 kg (152 lb)   06/20/06 68 kg (150 lb)   05/10/06 66.2 kg (146 lb)   03/17/06 67.1 kg (148 lb)         ESTIMATED KCAL REQUIREMENTS:  *** kcal per day    NUTRITION DIAGNOSIS: {:939657} related to *** as evidenced by ***    NUTRITION INTERVENTION:  {:251019}    PATIENT'S BEHAVIOR CHANGE GOALS:   See Patient Instructions for patient stated behavior change goals. AVS was printed and given to patient at today's appointment.    MONITOR / EVALUATE:  RD will  monitor/evaluate:  {:360543}    FOLLOW-UP:  {:783414}    ***  Time spent in minutes: ***  Encounter: Individual      Medical Nutrition Therapy  Visit Type:Reassessment and intervention    Angelique Rojas presents today for MNT and education related to weight management for obesity Body mass index is 32.93 kg/m .  She is accompanied by self.     ASSESSMENT:   Patient has tapered off Vyvanse (patient states it helped lose approximately 20 pounds lowest weight noted 3/2022 to 186 pounds, weight quite stable over the past year between 199 - 205#) now that patient has been off Vyvanse patient notes an increase in cravings and weight gain.  Patient is looking for nutrition education along with recommendations for alternative weight loss medication.  Patient comments/concerns relating to nutrition: Concerns with significant cravings especially sweets no certain time of day.  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  NUTRITION HISTORY:  Better about eating fruits and vegetables.  It does not consume a lot of dairy and will start taking a calcium supplement daily patient already takes vitamin  IU daily.  Patient follows a low-sodium meal plan.  Eating 3 meals per day.  Patient does consume lean proteins with meals twice a day but notes over eating is an issue.    Misses meals? no  Eats out:  1-2 meals/per week     Previous diet education:  Yes  2/2017    Food allergies/intolerances: No known food allergies    Diet is high in: calories  Diet is low in: fiber and vegetables    EXERCISE: Walking the dog 1-2 times per day  MEDICATIONS:  Current Outpatient Medications   Medication     Semaglutide-Weight Management (WEGOVY) 0.25 MG/0.5ML pen     acetaminophen (TYLENOL) 500 MG tablet     amLODIPine (NORVASC) 5 MG tablet     buPROPion (WELLBUTRIN SR) 100 MG 12 hr tablet     escitalopram (LEXAPRO) 20 MG tablet      "MULTIVITAMIN TABS   OR     omeprazole (PRILOSEC) 20 MG DR capsule     traMADol (ULTRAM) 50 MG tablet     traZODone (DESYREL) 50 MG tablet     VITAMIN D 1000 UNIT OR TABS     ZOLMitriptan (ZOMIG-ZMT) 5 MG ODT     No current facility-administered medications for this visit.       LABS:  Lab Results   Component Value Date     11/03/2023     10/28/2015      Lab Results   Component Value Date    POTASSIUM 3.9 11/03/2023    POTASSIUM 3.7 10/28/2021    POTASSIUM 3.8 10/28/2015     Lab Results   Component Value Date    CHLORIDE 105 11/03/2023    CHLORIDE 107 10/28/2021    CHLORIDE 103 10/28/2015     Lab Results   Component Value Date    KEKE 9.2 11/03/2023    KEKE 9.2 10/28/2015     Lab Results   Component Value Date    CO2 25 11/03/2023    CO2 27 10/28/2021    CO2 27 10/28/2015     Lab Results   Component Value Date    BUN 11.5 11/03/2023    BUN 16 10/28/2021    BUN 9 10/28/2015     Lab Results   Component Value Date    CR 0.86 11/03/2023    CR 0.72 10/28/2015     Lab Results   Component Value Date    GLC 84 11/03/2023    GLC 83 10/28/2021    GLC 74 10/28/2015     Lab Results   Component Value Date     03/09/2023     10/28/2015     HDL Cholesterol   Date Value Ref Range Status   10/28/2015 59 >50 mg/dL Final     Direct Measure HDL   Date Value Ref Range Status   03/09/2023 67 >=50 mg/dL Final   ]  GFR Estimate   Date Value Ref Range Status   11/03/2023 79 >60 mL/min/1.73m2 Final   10/28/2015 86 >60 mL/min/1.7m2 Final     Comment:     Non  GFR Calc     Lab Results   Component Value Date    CR 0.86 11/03/2023    CR 0.72 10/28/2015     No results found for: \"MICROALBUMIN\"    ANTHROPOMETRICS:  Vitals: Ht 1.683 m (5' 6.25\")   Wt 93.3 kg (205 lb 9.6 oz)   LMP  (LMP Unknown)   BMI 32.93 kg/m    Body mass index is 32.93 kg/m .      Wt Readings from Last 5 Encounters:   11/15/23 93.3 kg (205 lb 9.6 oz)   11/03/23 91.4 kg (201 lb 6.4 oz)   06/29/23 90.3 kg (199 lb)   06/02/23 91.3 kg (201 " lb 3.2 oz)   03/09/23 92.3 kg (203 lb 6.4 oz)     ESTIMATED KCAL REQUIREMENTS:  1200 kcal per day    NUTRITION DIAGNOSIS: Overweight/Obesity related to increased calorie intake as evidenced by BMI      NUTRITION INTERVENTION:  Nutrition Prescription: Energy Intake:  kcal/day  Education given to support: general nutrition guidelines, weight reduction, exercise and portion control  Education Materials Provided: My Plate Planner/Choose My Plate and Fiber Facts    Discussed what dietary changes has worked well for her in the past and how to incorporate them again.  Discussion on healthy behavior changes that can promote calorie reduction in diet.    We discussed why it is important to incorporate healthy lifestyle interventions to control overall health to prevent complications of being obese including the potential of developing diabetes and preventing heart disease.      Nutritional Psychiatry Your Brain on Food - discussion on how what you eat affects microbiome/ hormones and how you feel physically and emotionally.    Hunger/ Fullness cues - help identify how pt is feeling before, during, and after eating   Smart Snacking and 20 Ways to eat more fruit and vegetables.    Mindful eating - listening to body vs eating out of stress, boredom, emotion, eating to fuel body for strength and energy   Tracking food - balance of meals and snacks   Importance of daily physical activity as able to promote endorphin release       reduce stress, anxiety, and depression, improve sleep, increase energy level, improve muscle tone and strength ...   Recommend: Discussed options for medications and reduced calorie dietary guidelines.    Wegovy: proper use, mechanism of action, indications, dosing, injection schedule, safety and side effects.  Pt is shown a demonstration of the use of the pen and was able to return demonstration without difficulty.   Dose Escalation Schedule  Weeks                             Weekly Dose  1 through 4                       0.25 mg  5 through 8                      0.5 mg  9 through 12                    1 mg  13 through 16                  1.7 mg  Week 17 and onward      2.4 mg Maintenance dose    PATIENT'S BEHAVIOR CHANGE GOALS:   See Patient Instructions for patient stated behavior change goals. AVS was printed and given to patient at today's appointment.    MONITOR / EVALUATE:  RD will monitor/evaluate:  Pertinent Labs  Weight change    FOLLOW-UP:  Follow up with RD as needed.    Time spent in minutes: 30  Encounter: Individual

## 2023-11-28 ENCOUNTER — MYC MEDICAL ADVICE (OUTPATIENT)
Dept: EDUCATION SERVICES | Facility: CLINIC | Age: 58
End: 2023-11-28
Payer: COMMERCIAL

## 2023-11-28 DIAGNOSIS — R63.2 BINGE EATING: Primary | ICD-10-CM

## 2023-11-28 DIAGNOSIS — F41.1 GENERALIZED ANXIETY DISORDER: ICD-10-CM

## 2023-11-28 RX ORDER — ESCITALOPRAM OXALATE 20 MG/1
TABLET ORAL
Qty: 90 TABLET | Refills: 3 | OUTPATIENT
Start: 2023-11-28

## 2023-11-29 DIAGNOSIS — R63.2 BINGE EATING: ICD-10-CM

## 2023-11-29 RX ORDER — LISDEXAMFETAMINE DIMESYLATE 50 MG/1
50 CAPSULE ORAL EVERY MORNING
Qty: 30 CAPSULE | Refills: 0 | Status: SHIPPED | OUTPATIENT
Start: 2023-11-29 | End: 2023-12-01

## 2023-11-29 RX ORDER — LISDEXAMFETAMINE DIMESYLATE 50 MG/1
50 CAPSULE ORAL EVERY MORNING
Qty: 30 CAPSULE | Refills: 0 | OUTPATIENT
Start: 2023-11-29

## 2023-11-29 NOTE — TELEPHONE ENCOUNTER
Medication Question or Refill    Contacts         Type Contact Phone/Fax    11/29/2023 02:26 PM CST Phone (Incoming) Karlene Rojas (Self) [phone] (H)            What medication are you calling about (include dose and sig)?: Vyvanse    Preferred Pharmacy:       [facility] - [place], [place] - [address] [place] [id number]  Phone: [phone] Fax: [phone]      Controlled Substance Agreement on file:   CSA -- Patient Level:    CSA: None found at the patient level.       Who prescribed the medication?: Loraine Buchanan    Do you need a refill? Yes-[facility] is out of the 50 mg cap, so patient is requesting a 20mg and 30 mg to equal the 50 mg. Please note: per [facility], the directions must be stated as such.    When did you use the medication last? Sept 2023    Patient offered an appointment? No    Do you have any questions or concerns?  No      Could we send this information to you in [facility] or would you prefer to receive a phone call?:   Patient would prefer a phone call   Okay to leave a detailed message?: Yes at Home number on file [phone] (home)

## 2023-11-30 NOTE — TELEPHONE ENCOUNTER
Please resend script to [facility] [place] as [facility] is out of this medication.

## 2023-12-01 RX ORDER — LISDEXAMFETAMINE DIMESYLATE 50 MG/1
50 CAPSULE ORAL EVERY MORNING
Qty: 30 CAPSULE | Refills: 0 | Status: SHIPPED | OUTPATIENT
Start: 2023-12-01 | End: 2024-01-02

## 2023-12-06 ENCOUNTER — VIRTUAL VISIT (OUTPATIENT)
Dept: FAMILY MEDICINE | Facility: CLINIC | Age: 58
End: 2023-12-06
Payer: COMMERCIAL

## 2023-12-06 DIAGNOSIS — E66.09 CLASS 1 OBESITY DUE TO EXCESS CALORIES WITHOUT SERIOUS COMORBIDITY WITH BODY MASS INDEX (BMI) OF 33.0 TO 33.9 IN ADULT: ICD-10-CM

## 2023-12-06 DIAGNOSIS — J20.9 ACUTE BRONCHITIS, UNSPECIFIED ORGANISM: Primary | ICD-10-CM

## 2023-12-06 DIAGNOSIS — E66.811 CLASS 1 OBESITY DUE TO EXCESS CALORIES WITHOUT SERIOUS COMORBIDITY WITH BODY MASS INDEX (BMI) OF 33.0 TO 33.9 IN ADULT: ICD-10-CM

## 2023-12-06 DIAGNOSIS — R63.2 BINGE EATING: ICD-10-CM

## 2023-12-06 PROCEDURE — 99213 OFFICE O/P EST LOW 20 MIN: CPT | Mod: VID | Performed by: NURSE PRACTITIONER

## 2023-12-06 RX ORDER — BENZONATATE 200 MG/1
200 CAPSULE ORAL 3 TIMES DAILY PRN
Qty: 21 CAPSULE | Refills: 0 | Status: SHIPPED | OUTPATIENT
Start: 2023-12-06 | End: 2024-01-11

## 2023-12-06 RX ORDER — AZITHROMYCIN 250 MG/1
TABLET, FILM COATED ORAL
Qty: 6 TABLET | Refills: 0 | Status: SHIPPED | OUTPATIENT
Start: 2023-12-06 | End: 2023-12-11

## 2023-12-06 ASSESSMENT — ENCOUNTER SYMPTOMS: COUGH: 1

## 2023-12-06 ASSESSMENT — PATIENT HEALTH QUESTIONNAIRE - PHQ9
10. IF YOU CHECKED OFF ANY PROBLEMS, HOW DIFFICULT HAVE THESE PROBLEMS MADE IT FOR YOU TO DO YOUR WORK, TAKE CARE OF THINGS AT HOME, OR GET ALONG WITH OTHER PEOPLE: NOT DIFFICULT AT ALL
SUM OF ALL RESPONSES TO PHQ QUESTIONS 1-9: 8
SUM OF ALL RESPONSES TO PHQ QUESTIONS 1-9: 8

## 2023-12-06 NOTE — PROGRESS NOTES
"Karlene is a 58 year old who is being evaluated via a billable video visit.      How would you like to obtain your AVS? MyChart  If the video visit is dropped, the invitation should be resent by: Text to cell phone: [phone]  Will anyone else be joining your video visit? No          Assessment & Plan     (J20.9) Acute bronchitis, unspecified organism  (primary encounter diagnosis)  Comment:   Plan: benzonatate (TESSALON) 200 MG capsule,         azithromycin (ZITHROMAX) 250 MG tablet        Continue other remedies             BMI:   Estimated body mass index is 32.93 kg/m  as calculated from the following:    Height as of 11/15/23: 1.683 m (5' 6.25\").    Weight as of 11/15/23: 93.3 kg (205 lb 9.6 oz).           Loraine Buchanan NP  [facility]    Subjective   Karlene is a 58 year old, presenting for the following health issues:  Dry cough for almost 4 weeks, it is progressively worsening  Has some nausea and diarrhea   with same things  She has had multiple negative. COVID tests.  Using Mucinex and tylenol  Last significant respiratory infection over a year ago  Feels like it has moved to her chest ,not in her sinuses      12/6/2023     1:20 PM   Additional Questions   Roomed by Ella Martinez    History of Present Illness       Reason for visit:  Respiratory symptoms, worsening cough  Symptom onset:  3-4 weeks ago  Symptoms include:  Worsening cough, achiness, swollen glands  Symptom intensity:  Moderate  Symptom progression:  Worsening  Had these symptoms before:  No  What makes it worse:  Talking  What makes it better:  Tylenol somewhat    She eats 2-3 servings of fruits and vegetables daily.She consumes 3 sweetened beverage(s) daily.She exercises with enough effort to increase her heart rate 20 to 29 minutes per day.  She exercises with enough effort to increase her heart rate 7 days per week.   She is taking medications regularly.               Review of Systems   Respiratory:  " Positive for cough.             Objective           Vitals:  No vitals were obtained today due to virtual visit.    Physical Exam   GENERAL: Healthy, alert and no distress  EYES: Eyes grossly normal to inspection.  No discharge or erythema, or obvious scleral/conjunctival abnormalities.  RESP: No audible wheeze, cough, or visible cyanosis.  No visible retractions or increased work of breathing.    SKIN: Visible skin clear. No significant rash, abnormal pigmentation or lesions.  NEURO: Cranial nerves grossly intact.  Mentation and speech appropriate for age.  PSYCH: Mentation appears normal, affect normal/bright, judgement and insight intact, normal speech and appearance well-groomed.  Harsh loose barking cough throughout visit                Video-Visit Details    Type of service:  Video Visit     Originating Location (pt. Location): Home    Distant Location (provider location):  On-site  Platform used for Video Visit: Jenelle

## 2023-12-12 ENCOUNTER — TELEPHONE (OUTPATIENT)
Dept: FAMILY MEDICINE | Facility: CLINIC | Age: 58
End: 2023-12-12
Payer: COMMERCIAL

## 2023-12-12 NOTE — TELEPHONE ENCOUNTER
Prior Authorization Request   Who s requesting:  Pharmacy  Pharmacy Name and Location: [facility]  Medication Name: Zepbound 2.5 mg/ 0.5 mL  Insurance Plan: [employer]  Key:  [id number]

## 2023-12-14 ENCOUNTER — MYC MEDICAL ADVICE (OUTPATIENT)
Dept: FAMILY MEDICINE | Facility: CLINIC | Age: 58
End: 2023-12-14
Payer: COMMERCIAL

## 2023-12-14 DIAGNOSIS — J20.9 ACUTE BRONCHITIS, UNSPECIFIED ORGANISM: Primary | ICD-10-CM

## 2023-12-14 RX ORDER — FLUTICASONE PROPIONATE 110 UG/1
2 AEROSOL, METERED RESPIRATORY (INHALATION) 2 TIMES DAILY
Qty: 12 G | Refills: 1 | Status: SHIPPED | OUTPATIENT
Start: 2023-12-14 | End: 2023-12-18

## 2023-12-14 NOTE — TELEPHONE ENCOUNTER
PA Initiation    Medication: ZEPBOUND 2.5 MG/0.5ML SC SOAJ  Insurance Company: [employer] - Phone [phone] Fax [phone]  Pharmacy Filling the Rx: [facility] [place] - [place], [place] - [address] [place] [address]  Filling Pharmacy Phone: [phone]  Filling Pharmacy Fax:    Start Date: 12/14/2023

## 2023-12-14 NOTE — TELEPHONE ENCOUNTER
Routing to provider. Pt was seen for virtual visit on 12/6 for Bronchitis, was prescribed Azithromycin and Tessalon pearls. Pt does not see improvement and would like an inhaler. Please advise.

## 2023-12-15 NOTE — TELEPHONE ENCOUNTER
Routing to covering provider for approval (PCP OC).   Insurance requiring PA for Flovent 110mcg.  Per patient - insurance's preferred alternative is Pulmicort.

## 2023-12-15 NOTE — TELEPHONE ENCOUNTER
PRIOR AUTHORIZATION DENIED    Medication: ZEPBOUND 2.5 MG/0.5ML SC SOAJ  Insurance Company: [employer] - Phone [phone] Fax [phone]  Denial Date: 12/14/2023  Denial Reason(s): PT HAS TO TRY FAIL ALL 3 PREFERRED ALTERNATIVES: QSYMIA, SAXENDA AND WEGOVY    Appeal Information:     Patient Notified: NO

## 2024-01-02 ENCOUNTER — MYC REFILL (OUTPATIENT)
Dept: FAMILY MEDICINE | Facility: CLINIC | Age: 59
End: 2024-01-02
Payer: COMMERCIAL

## 2024-01-02 DIAGNOSIS — R63.2 BINGE EATING: ICD-10-CM

## 2024-01-02 RX ORDER — LISDEXAMFETAMINE DIMESYLATE 50 MG/1
50 CAPSULE ORAL EVERY MORNING
Qty: 30 CAPSULE | Refills: 0 | Status: SHIPPED | OUTPATIENT
Start: 2024-01-02 | End: 2024-01-11

## 2024-01-10 ASSESSMENT — PATIENT HEALTH QUESTIONNAIRE - PHQ9
SUM OF ALL RESPONSES TO PHQ QUESTIONS 1-9: 1
10. IF YOU CHECKED OFF ANY PROBLEMS, HOW DIFFICULT HAVE THESE PROBLEMS MADE IT FOR YOU TO DO YOUR WORK, TAKE CARE OF THINGS AT HOME, OR GET ALONG WITH OTHER PEOPLE: NOT DIFFICULT AT ALL
SUM OF ALL RESPONSES TO PHQ QUESTIONS 1-9: 1

## 2024-01-11 ENCOUNTER — OFFICE VISIT (OUTPATIENT)
Dept: FAMILY MEDICINE | Facility: CLINIC | Age: 59
End: 2024-01-11
Payer: COMMERCIAL

## 2024-01-11 VITALS
TEMPERATURE: 97.7 F | WEIGHT: 202 LBS | HEIGHT: 66 IN | DIASTOLIC BLOOD PRESSURE: 80 MMHG | OXYGEN SATURATION: 99 % | HEART RATE: 73 BPM | SYSTOLIC BLOOD PRESSURE: 124 MMHG | BODY MASS INDEX: 32.47 KG/M2 | RESPIRATION RATE: 12 BRPM

## 2024-01-11 DIAGNOSIS — E66.811 CLASS 1 OBESITY DUE TO EXCESS CALORIES WITHOUT SERIOUS COMORBIDITY WITH BODY MASS INDEX (BMI) OF 33.0 TO 33.9 IN ADULT: Primary | ICD-10-CM

## 2024-01-11 DIAGNOSIS — E66.09 CLASS 1 OBESITY DUE TO EXCESS CALORIES WITHOUT SERIOUS COMORBIDITY WITH BODY MASS INDEX (BMI) OF 33.0 TO 33.9 IN ADULT: Primary | ICD-10-CM

## 2024-01-11 DIAGNOSIS — I10 PRIMARY HYPERTENSION: ICD-10-CM

## 2024-01-11 PROCEDURE — 99214 OFFICE O/P EST MOD 30 MIN: CPT | Performed by: NURSE PRACTITIONER

## 2024-01-11 RX ORDER — AMLODIPINE BESYLATE 10 MG/1
10 TABLET ORAL DAILY
Qty: 90 TABLET | Refills: 3 | Status: SHIPPED | OUTPATIENT
Start: 2024-01-11 | End: 2024-06-05

## 2024-01-11 NOTE — PROGRESS NOTES
"  Assessment & Plan     (E66.09,  Z68.33) Class 1 obesity due to excess calories without serious comorbidity with body mass index (BMI) of 33.0 to 33.9 in adult  (primary encounter diagnosis)  Comment:   Plan:     (I10) Primary hypertension  Comment:   Plan: amLODIPine (NORVASC) 10 MG tablet    She has increased her amlodipine to 10mg and finds at home these are much better bp readings. Would like to continue the at home dose of 10mg          See copy of letter of medical necessity written to cover Zepbound  She has met with Fadumo Kang RD multiple times for hep with weight loss.             BMI:   Estimated body mass index is 32.36 kg/m  as calculated from the following:    Height as of this encounter: 1.683 m (5' 6.25\").    Weight as of this encounter: 91.6 kg (202 lb).           Loraine Buchanan NP  [facility]    Cari Soler is a 58 year old, presenting for the following health issues:  Started Zepbound, bought one month for $500, pharmacy. Needs me to write letter of medical necessity    She took Saxenda in past approx 5 years ago, didn't tolerate it due to abdominal pain/diarrhea and made migraines worse.    Has used Topamax in past for migraines and migraines worsened and needed to stop it.  She also has Hypertension and is on medication to treat therefore a stimulant as phentermine is contraindicated. Therefore can not use Qsymia.    She has been prescribed Wegovy and is unable to fill the prescription due to supply shortage.    Zepbound is GLP 1 , she is using 2.5 mg weekly for 4 weeks and needs next dose of 5mg subcutaneous weekly.  Tolerating well      Discuss Zepbound insurance denial        1/11/2024     7:44 AM   Additional Questions   Roomed by LA       History of Present Illness       Reason for visit:  Zepbound appeal for prior authorization    She eats 2-3 servings of fruits and vegetables daily.She consumes 4 sweetened beverage(s) daily.She exercises with enough " "effort to increase her heart rate 20 to 29 minutes per day.  She exercises with enough effort to increase her heart rate 5 days per week.   She is taking medications regularly.                 Review of Systems         Objective    /80 (BP Location: Right arm, Patient Position: Sitting, Cuff Size: Adult Large)   Pulse 73   Temp 97.7  F (36.5  C) (Tympanic)   Resp 12   Ht 1.683 m (5' 6.25\")   Wt 91.6 kg (202 lb)   LMP  (LMP Unknown)   SpO2 99%   BMI 32.36 kg/m    Body mass index is 32.36 kg/m .  Physical Exam   GENERAL: healthy, alert and no distress  MS: no gross musculoskeletal defects noted, no edema            25 min spend in chart review, face to face, documentation and writing letter of medical necessity.          "

## 2024-01-11 NOTE — LETTER
January 11, 2024    Regarding:  Karlene Rojas  [address]  [facility] [address]    To [employer]  Prescription Claim Appeals        To Whom It May Concern:    Karlene is a 58 year old, presenting for the following health issues: She is working to loose weight to improve her health and has met barriers.  Has met with Registered Dietician multiple times and tried multiple meds and programs for weight loss without success:     Needs me to write letter of medical necessity.    Angelique took Saxenda in past approx 5 years ago, didn't tolerate it due to abdominal pain/diarrhea and made migraines worse.    Has used Topamax in past for migraines and migraines worsened and needed to stop it.  She also has Hypertension and is on medication to treat therefore a stimulant as phentermine is contraindicated. Therefore can not use Qsymia.    She has been prescribed Wegovy and is unable to fill the prescription due to supply shortage. She has called multiple pharmacies multiple times.    She would like to be approved for Zepbound to use for weight loss. A prescription has already been sent to her pharmacy.    Please consider this as her letter of medical necessity.         Sincerely,        TAYLER Chaudhry

## 2024-01-17 NOTE — TELEPHONE ENCOUNTER
Medication Appeal Initiation    Medication: ZEPBOUND 2.5 MG/0.5ML SC SOAJ  Appeal Start Date:  1/17/2024  Insurance Company: [employer]  Insurance Phone: [phone]  Insurance Fax: [phone]  Comments:     SENT APPEAL WITH LMN, CHART NOTES, TRIED/FAILED TO INS

## 2024-01-21 DIAGNOSIS — F33.1 MAJOR DEPRESSIVE DISORDER, RECURRENT EPISODE, MODERATE (H): ICD-10-CM

## 2024-01-22 ENCOUNTER — MYC REFILL (OUTPATIENT)
Dept: FAMILY MEDICINE | Facility: CLINIC | Age: 59
End: 2024-01-22
Payer: COMMERCIAL

## 2024-01-22 DIAGNOSIS — F33.1 MAJOR DEPRESSIVE DISORDER, RECURRENT EPISODE, MODERATE (H): ICD-10-CM

## 2024-01-22 RX ORDER — BUPROPION HYDROCHLORIDE 100 MG/1
100 TABLET, EXTENDED RELEASE ORAL DAILY
Qty: 90 TABLET | Refills: 0 | Status: CANCELLED | OUTPATIENT
Start: 2024-01-22

## 2024-01-22 RX ORDER — BUPROPION HYDROCHLORIDE 100 MG/1
100 TABLET, EXTENDED RELEASE ORAL DAILY
Qty: 90 TABLET | Refills: 0 | Status: SHIPPED | OUTPATIENT
Start: 2024-01-22 | End: 2024-04-23

## 2024-01-23 ENCOUNTER — MYC MEDICAL ADVICE (OUTPATIENT)
Dept: EDUCATION SERVICES | Facility: CLINIC | Age: 59
End: 2024-01-23
Payer: COMMERCIAL

## 2024-01-25 ENCOUNTER — MYC MEDICAL ADVICE (OUTPATIENT)
Dept: FAMILY MEDICINE | Facility: CLINIC | Age: 59
End: 2024-01-25
Payer: COMMERCIAL

## 2024-01-25 DIAGNOSIS — E66.09 CLASS 1 OBESITY DUE TO EXCESS CALORIES WITHOUT SERIOUS COMORBIDITY WITH BODY MASS INDEX (BMI) OF 33.0 TO 33.9 IN ADULT: Primary | ICD-10-CM

## 2024-01-25 DIAGNOSIS — E66.811 CLASS 1 OBESITY DUE TO EXCESS CALORIES WITHOUT SERIOUS COMORBIDITY WITH BODY MASS INDEX (BMI) OF 33.0 TO 33.9 IN ADULT: Primary | ICD-10-CM

## 2024-01-25 DIAGNOSIS — R63.2 BINGE EATING: ICD-10-CM

## 2024-01-25 NOTE — TELEPHONE ENCOUNTER
MEDICATION APPEAL DENIED    Medication: ZEPBOUND 2.5 MG/0.5ML SC SOAJ  Insurance Company: [email or web address] - Phone [phone] Fax [phone]   Denial Date: 1/25/2024  Denial Reason(s): PT HAS TO TRY/FAIL 2 PREFERRED ALTERNATIVES:      Second Level Appeal Information:    Patient Notified: NO  Central Prior Authorization Team ONLY: Second level appeals will be managed by the clinic staff and provider. Please contact the [employer] Prior Authorization Team if additional information about the denial is needed.

## 2024-01-26 NOTE — TELEPHONE ENCOUNTER
Routing message to PCP for review and advise on dose increase of Zepbound. Order pended for review for accuracy and signature.  Last OV 1/11/24

## 2024-02-06 ENCOUNTER — MYC MEDICAL ADVICE (OUTPATIENT)
Dept: EDUCATION SERVICES | Facility: CLINIC | Age: 59
End: 2024-02-06
Payer: COMMERCIAL

## 2024-02-06 NOTE — TELEPHONE ENCOUNTER
Second level appeal for prior authorization on Zepbound has been denied.    Patient has to have tried/failed on 2 formulary alternatives: Orlistat, Qsymia, Wegovy (alternatives may require prior authorization).

## 2024-02-21 NOTE — TELEPHONE ENCOUNTER
Patient tolerating zepbound well completing 5 mg weekly dose x 4 weeks patient requesting increase in dose to 7.5 mg weekly x 4 weeks with 1 refill.

## 2024-03-01 ENCOUNTER — MYC MEDICAL ADVICE (OUTPATIENT)
Dept: EDUCATION SERVICES | Facility: CLINIC | Age: 59
End: 2024-03-01
Payer: COMMERCIAL

## 2024-03-05 NOTE — TELEPHONE ENCOUNTER
Prescription for Wegovy 1 mg sent in as zepbound not being covered and Wegovy 1 mg is back in stock at this time.

## 2024-03-24 DIAGNOSIS — F51.02 TRANSIENT INSOMNIA: ICD-10-CM

## 2024-03-25 RX ORDER — TRAZODONE HYDROCHLORIDE 50 MG/1
TABLET, FILM COATED ORAL
Qty: 30 TABLET | Refills: 3 | Status: SHIPPED | OUTPATIENT
Start: 2024-03-25 | End: 2024-07-26

## 2024-03-28 ENCOUNTER — PATIENT OUTREACH (OUTPATIENT)
Dept: CARE COORDINATION | Facility: CLINIC | Age: 59
End: 2024-03-28
Payer: COMMERCIAL

## 2024-04-22 DIAGNOSIS — F33.1 MAJOR DEPRESSIVE DISORDER, RECURRENT EPISODE, MODERATE (H): ICD-10-CM

## 2024-04-22 DIAGNOSIS — M54.9 CHRONIC BACK PAIN, UNSPECIFIED BACK LOCATION, UNSPECIFIED BACK PAIN LATERALITY: ICD-10-CM

## 2024-04-22 DIAGNOSIS — G89.29 CHRONIC BACK PAIN, UNSPECIFIED BACK LOCATION, UNSPECIFIED BACK PAIN LATERALITY: ICD-10-CM

## 2024-04-23 RX ORDER — TRAMADOL HYDROCHLORIDE 50 MG/1
TABLET ORAL
Qty: 30 TABLET | Refills: 5 | OUTPATIENT
Start: 2024-04-23

## 2024-04-23 RX ORDER — BUPROPION HYDROCHLORIDE 100 MG/1
100 TABLET, EXTENDED RELEASE ORAL DAILY
Qty: 90 TABLET | Refills: 0 | Status: SHIPPED | OUTPATIENT
Start: 2024-04-23 | End: 2024-06-05

## 2024-04-23 NOTE — TELEPHONE ENCOUNTER
Sent [employer] message letting patient know that she will need to schedule an appt to discuss refills on Tramadol.

## 2024-04-25 ENCOUNTER — PATIENT OUTREACH (OUTPATIENT)
Dept: CARE COORDINATION | Facility: CLINIC | Age: 59
End: 2024-04-25

## 2024-05-03 ENCOUNTER — PATIENT OUTREACH (OUTPATIENT)
Dept: CARE COORDINATION | Facility: CLINIC | Age: 59
End: 2024-05-03
Payer: COMMERCIAL

## 2024-05-17 ENCOUNTER — PATIENT OUTREACH (OUTPATIENT)
Dept: CARE COORDINATION | Facility: CLINIC | Age: 59
End: 2024-05-17
Payer: COMMERCIAL

## 2024-05-24 DIAGNOSIS — K27.4 GASTROINTESTINAL HEMORRHAGE ASSOCIATED WITH PEPTIC ULCER: ICD-10-CM

## 2024-06-04 SDOH — HEALTH STABILITY: PHYSICAL HEALTH: ON AVERAGE, HOW MANY MINUTES DO YOU ENGAGE IN EXERCISE AT THIS LEVEL?: 30 MIN

## 2024-06-04 SDOH — HEALTH STABILITY: PHYSICAL HEALTH: ON AVERAGE, HOW MANY DAYS PER WEEK DO YOU ENGAGE IN MODERATE TO STRENUOUS EXERCISE (LIKE A BRISK WALK)?: 7 DAYS

## 2024-06-04 ASSESSMENT — SOCIAL DETERMINANTS OF HEALTH (SDOH): HOW OFTEN DO YOU GET TOGETHER WITH FRIENDS OR RELATIVES?: ONCE A WEEK

## 2024-06-05 ENCOUNTER — TELEPHONE (OUTPATIENT)
Dept: FAMILY MEDICINE | Facility: CLINIC | Age: 59
End: 2024-06-05

## 2024-06-05 ENCOUNTER — OFFICE VISIT (OUTPATIENT)
Dept: FAMILY MEDICINE | Facility: CLINIC | Age: 59
End: 2024-06-05
Payer: COMMERCIAL

## 2024-06-05 VITALS
WEIGHT: 174 LBS | TEMPERATURE: 98.4 F | RESPIRATION RATE: 14 BRPM | OXYGEN SATURATION: 98 % | SYSTOLIC BLOOD PRESSURE: 114 MMHG | BODY MASS INDEX: 28.99 KG/M2 | HEIGHT: 65 IN | DIASTOLIC BLOOD PRESSURE: 76 MMHG | HEART RATE: 64 BPM

## 2024-06-05 DIAGNOSIS — M25.561 CHRONIC PAIN OF RIGHT KNEE: Primary | ICD-10-CM

## 2024-06-05 DIAGNOSIS — M54.9 CHRONIC BACK PAIN, UNSPECIFIED BACK LOCATION, UNSPECIFIED BACK PAIN LATERALITY: ICD-10-CM

## 2024-06-05 DIAGNOSIS — K92.0 GASTROINTESTINAL HEMORRHAGE WITH HEMATEMESIS: ICD-10-CM

## 2024-06-05 DIAGNOSIS — F33.1 MAJOR DEPRESSIVE DISORDER, RECURRENT EPISODE, MODERATE (H): ICD-10-CM

## 2024-06-05 DIAGNOSIS — G89.29 CHRONIC PAIN OF RIGHT KNEE: Primary | ICD-10-CM

## 2024-06-05 DIAGNOSIS — I10 PRIMARY HYPERTENSION: ICD-10-CM

## 2024-06-05 DIAGNOSIS — G89.29 CHRONIC BACK PAIN, UNSPECIFIED BACK LOCATION, UNSPECIFIED BACK PAIN LATERALITY: ICD-10-CM

## 2024-06-05 DIAGNOSIS — Z00.00 ROUTINE GENERAL MEDICAL EXAMINATION AT A HEALTH CARE FACILITY: ICD-10-CM

## 2024-06-05 PROCEDURE — 99396 PREV VISIT EST AGE 40-64: CPT | Performed by: NURSE PRACTITIONER

## 2024-06-05 PROCEDURE — 99214 OFFICE O/P EST MOD 30 MIN: CPT | Mod: 25 | Performed by: NURSE PRACTITIONER

## 2024-06-05 RX ORDER — GABAPENTIN 300 MG/1
CAPSULE ORAL
Qty: 60 CAPSULE | Refills: 1 | Status: SHIPPED | OUTPATIENT
Start: 2024-06-05

## 2024-06-05 RX ORDER — HYDROCODONE BITARTRATE AND ACETAMINOPHEN 5; 325 MG/1; MG/1
1 TABLET ORAL
COMMUNITY
Start: 2024-01-31 | End: 2024-06-05

## 2024-06-05 RX ORDER — LISDEXAMFETAMINE DIMESYLATE 50 MG/1
50 CAPSULE ORAL DAILY
COMMUNITY
Start: 2024-01-22 | End: 2024-06-05

## 2024-06-05 RX ORDER — BUPROPION HYDROCHLORIDE 100 MG/1
100 TABLET, EXTENDED RELEASE ORAL DAILY
Qty: 90 TABLET | Refills: 0 | Status: SHIPPED | OUTPATIENT
Start: 2024-06-05

## 2024-06-05 RX ORDER — BUDESONIDE 90 UG/1
AEROSOL, POWDER RESPIRATORY (INHALATION)
COMMUNITY
Start: 2024-05-25 | End: 2024-08-21

## 2024-06-05 RX ORDER — TRAMADOL HYDROCHLORIDE 50 MG/1
50 TABLET ORAL 2 TIMES DAILY PRN
Qty: 60 TABLET | Refills: 1 | Status: SHIPPED | OUTPATIENT
Start: 2024-06-05 | End: 2024-07-31

## 2024-06-05 ASSESSMENT — ANXIETY QUESTIONNAIRES
4. TROUBLE RELAXING: NOT AT ALL
1. FEELING NERVOUS, ANXIOUS, OR ON EDGE: NOT AT ALL
GAD7 TOTAL SCORE: 0
GAD7 TOTAL SCORE: 0
5. BEING SO RESTLESS THAT IT IS HARD TO SIT STILL: NOT AT ALL
2. NOT BEING ABLE TO STOP OR CONTROL WORRYING: NOT AT ALL
IF YOU CHECKED OFF ANY PROBLEMS ON THIS QUESTIONNAIRE, HOW DIFFICULT HAVE THESE PROBLEMS MADE IT FOR YOU TO DO YOUR WORK, TAKE CARE OF THINGS AT HOME, OR GET ALONG WITH OTHER PEOPLE: NOT DIFFICULT AT ALL
7. FEELING AFRAID AS IF SOMETHING AWFUL MIGHT HAPPEN: NOT AT ALL
GAD7 TOTAL SCORE: 0
6. BECOMING EASILY ANNOYED OR IRRITABLE: NOT AT ALL
3. WORRYING TOO MUCH ABOUT DIFFERENT THINGS: NOT AT ALL
8. IF YOU CHECKED OFF ANY PROBLEMS, HOW DIFFICULT HAVE THESE MADE IT FOR YOU TO DO YOUR WORK, TAKE CARE OF THINGS AT HOME, OR GET ALONG WITH OTHER PEOPLE?: NOT DIFFICULT AT ALL
7. FEELING AFRAID AS IF SOMETHING AWFUL MIGHT HAPPEN: NOT AT ALL

## 2024-06-05 ASSESSMENT — PATIENT HEALTH QUESTIONNAIRE - PHQ9
SUM OF ALL RESPONSES TO PHQ QUESTIONS 1-9: 1
SUM OF ALL RESPONSES TO PHQ QUESTIONS 1-9: 1
10. IF YOU CHECKED OFF ANY PROBLEMS, HOW DIFFICULT HAVE THESE PROBLEMS MADE IT FOR YOU TO DO YOUR WORK, TAKE CARE OF THINGS AT HOME, OR GET ALONG WITH OTHER PEOPLE: NOT DIFFICULT AT ALL

## 2024-06-05 NOTE — PROGRESS NOTES
"Preventive Care Visit  [facility]  Loraine Buchanan NP, Family Medicine  Jun 5, 2024      Assessment & Plan     (M25.561,  G89.29) Chronic pain of right knee  (primary encounter diagnosis)  Comment: she is seeking approval from insurance (so needs a referral from me) so that her previous knee surgeon can replace her right knee. Struggling with chronic pain in the knee and using Tramadol bid with tylenol in between but would like NSAID as well, she had GI bleed age 56 and must not use NSAIDS (has been adding ibuprofen and feels 'ok'. I strongly emphasized it is NOT ok to use NSAIDs and recommended trial gabapentin, some tramadol continuation while waiting for anoop to kick in, hopefully knee replacement can occur this summer  Plan: Orthopedic  Referral, gabapentin         (NEURONTIN) 300 MG capsule        WILL GET her to her preferred ortho for knee surgery    (K92.0) Gastrointestinal hemorrhage with hematemesis  Comment:   Plan: hx of this, unable to have NSAIDS     (F33.1) Major depressive disorder, recurrent episode, moderate (H)  Comment:   Plan: buPROPion (WELLBUTRIN SR) 100 MG 12 hr tablet        controlled    (M54.9,  G89.29) Chronic back pain, unspecified back location, unspecified back pain laterality  Comment:   Plan: traMADol (ULTRAM) 50 MG tablet        Rare use of this till knee pain flaring and poorly controlled with Acetaminophen/salon pas. Ultram will be short term till right knee is replaced    (Z00.00) Routine general medical examination at a health care facility  Comment:   Plan:     (Z68.28) BMI 28.0-28.9,adult  Comment:   Plan: very successful with Wegovy for wt loss    (I10) Primary hypertension  Comment:   Plan: will stop amlodipine as her wt loss is bringing bp into desirable range            BMI  Estimated body mass index is 28.73 kg/m  as calculated from the following:    Height as of this encounter: 1.657 m (5' 5.25\").    Weight as of this encounter: 78.9 kg " (174 lb).   Weight management plan: Discussed healthy diet and exercise guidelines continue wegovy    Counseling  Appropriate preventive services were discussed with this patient, including applicable screening as appropriate for fall prevention, nutrition, physical activity, Tobacco-use cessation, weight loss and cognition.  Checklist reviewing preventive services available has been given to the patient.  Reviewed patient's diet, addressing concerns and/or questions.   She is at risk for psychosocial distress and has been provided with information to reduce risk.           Cari Soler is a 59 year old, presenting for the following: here for annual exam - stopped taking her Amlodipine    Needs referral to ortho for R knee    Has lost   Physical        6/5/2024    11:40 AM   Additional Questions   Roomed by priscilla simmons   Accompanied by self        Health Care Directive  Patient does not have a Health Care Directive or Living Will:     HPI    Hypertension Follow-up    Do you check your blood pressure regularly outside of the clinic? Yes   Are you following a low salt diet? Yes  Are your blood pressures ever more than 140 on the top number (systolic) OR more   than 90 on the bottom number (diastolic), for example 140/90? No        6/4/2024   General Health   How would you rate your overall physical health? Good   Feel stress (tense, anxious, or unable to sleep) Only a little   (!) STRESS CONCERN      6/4/2024   Nutrition   Three or more servings of calcium each day? (!) NO   Diet: Regular (no restrictions)   How many servings of fruit and vegetables per day? (!) 2-3   How many sweetened beverages each day? (!) 3         6/4/2024   Exercise   Days per week of moderate/strenous exercise 7 days   Average minutes spent exercising at this level 30 min         6/4/2024   Social Factors   Frequency of gathering with friends or relatives Once a week   Worry food won't last until get money to buy more No   Food not last or  not have enough money for food? No   Do you have housing?  Yes   Are you worried about losing your housing? No   Lack of transportation? No   Unable to get utilities (heat,electricity)? No         2024   Fall Risk   Fallen 2 or more times in the past year? No   Trouble with walking or balance? No          2024   Dental   Dentist two times every year? Yes         6/3/2024   TB Screening   Were you born outside of the [place]? No       Today's PHQ-9 Score:       2024    11:37 AM   PHQ-9 SCORE   PHQ-9 Total Score MyChart 1 (Minimal depression)   PHQ-9 Total Score 1     Answers submitted by the patient for this visit:  Patient Health Questionnaire (Submitted on 2024)  If you checked off any problems, how difficult have these problems made it for you to do your work, take care of things at home, or get along with other people?: Not difficult at all  PHQ9 TOTAL SCORE: 1  JUANITA-7 (Submitted on 2024)  JUANITA 7 TOTAL SCORE: 0      2024   Substance Use   Alcohol more than 3/day or more than 7/wk No   Do you use any other substances recreationally? No     Social History     Tobacco Use    Smoking status: Former     Current packs/day: 0.00     Average packs/day: 1 pack/day for 4.0 years (4.0 ttl pk-yrs)     Types: Cigarettes     Start date: 3/14/1987     Quit date: 3/14/1991     Years since quittin.2     Passive exposure: Past    Smokeless tobacco: Never   Vaping Use    Vaping status: Never Used   Substance Use Topics    Alcohol use: Not Currently     Alcohol/week: 0.0 standard drinks of alcohol    Drug use: No           2023   LAST FHS-7 RESULTS   1st degree relative breast or ovarian cancer Yes   Any relative bilateral breast cancer No   Any male have breast cancer No   Any ONE woman have BOTH breast AND ovarian cancer No   Any woman with breast cancer before 50yrs No   2 or more relatives with breast AND/OR ovarian cancer No   2 or more relatives with breast AND/OR bowel cancer No        Mammogram  "Screening - Mammogram every 1-2 years updated in Health Maintenance based on mutual decision making        6/4/2024   STI Screening   New sexual partner(s) since last STI/HIV test? No     History of abnormal Pap smear: No - age 30- 64 PAP with HPV every 5 years recommended        Latest Ref Rng & Units 10/28/2015     2:10 PM 10/28/2015    12:00 AM 10/5/2012    12:28 PM   PAP / HPV   PAP (Historical)   NIL  NIL    HPV 16 DNA NEG Negative      HPV 18 DNA NEG Negative      Other HR HPV NEG Negative        ASCVD Risk   The 10-year ASCVD risk score (Crystal WALDEN, et al., 2019) is: 2.6%    Values used to calculate the score:      Age: 59 years      Sex: Female      Is Non- : No      Diabetic: No      Tobacco smoker: No      Systolic Blood Pressure: 114 mmHg      Is BP treated: Yes      HDL Cholesterol: 67 mg/dL      Total Cholesterol: 238 mg/dL           Reviewed and updated as needed this visit by Provider                             Objective    Exam  /76 (BP Location: Right arm, Patient Position: Sitting)   Pulse 64   Temp 98.4  F (36.9  C)   Resp 14   Ht 1.657 m (5' 5.25\")   Wt 78.9 kg (174 lb)   LMP  (LMP Unknown)   SpO2 98%   Breastfeeding No   BMI 28.73 kg/m     Estimated body mass index is 28.73 kg/m  as calculated from the following:    Height as of this encounter: 1.657 m (5' 5.25\").    Weight as of this encounter: 78.9 kg (174 lb).    Physical Exam  GENERAL: alert and no distress  EYES: Eyes grossly normal to inspection, PERRL and conjunctivae and sclerae normal  HENT: ear canals and TM's normal, nose and mouth without ulcers or lesions  NECK: no adenopathy, no asymmetry, masses, or scars  RESP: lungs clear to auscultation - no rales, rhonchi or wheezes  CV: regular rate and rhythm, normal S1 S2, no S3 or S4, no murmur, click or rub, no peripheral edema  ABDOMEN: soft, nontender, no hepatosplenomegaly, no masses and bowel sounds normal  MS: no gross musculoskeletal " defects noted, no edema  NEURO: Normal strength and tone, mentation intact and speech normal  PSYCH: mentation appears normal, affect normal/bright        Signed Electronically by: Loraine Buchanan NP

## 2024-06-05 NOTE — PATIENT INSTRUCTIONS
"Preventive Care Advice   This is general advice we often give to help people stay healthy. Your care team may have specific advice just for you. Please talk to your care team about your own preventive care needs.  Lifestyle  Exercise at least 150 minutes each week (30 minutes a day, 5 days a week).  Do muscle strengthening activities 2 days a week. These help control your weight and prevent disease.  No smoking.  Wear sunscreen to prevent skin cancer.  Have your home tested for radon every 2 to 5 years. Radon is a colorless, odorless gas that can harm your lungs. To learn more, go to www.health.[facility].[place]. and search for \"Radon in Homes.\"  Keep guns unloaded and locked up in a safe place like a safe or gun vault, or, use a gun lock and hide the keys. Always lock away bullets separately. To learn more, visit [employer].gov and search for \"safe gun storage.\"  Nutrition  Eat 5 or more servings of fruits and vegetables each day.  Try wheat bread, brown rice and whole grain pasta (instead of white bread, rice, and pasta).  Get enough calcium and vitamin D. Check the label on foods and aim for 100% of the RDA (recommended daily allowance).  Regular exams  Have a dental exam and cleaning every 6 months.  See your health care team every year to talk about:  Any changes in your health.  Any medicines your care team has prescribed.  Preventive care, family planning, and ways to prevent chronic diseases.  Shots (vaccines)   HPV shots (up to age 26), if you've never had them before.  Hepatitis B shots (up to age 59), if you've never had them before.  COVID-19 shot: Get this shot when it's due.  Flu shot: Get a flu shot every year.  Tetanus shot: Get a tetanus shot every 10 years.  Pneumococcal, hepatitis A, and RSV shots: Ask your care team if you need these based on your risk.  Shingles shot (for age 50 and up).  General health tests  Diabetes screening:  Starting at age 35, Get screened for diabetes at least every 3 years.  If " you are younger than age 35, ask your care team if you should be screened for diabetes.  Cholesterol test: At age 39, start having a cholesterol test every 5 years, or more often if advised.  Bone density scan (DEXA): At age 50, ask your care team if you should have this scan for osteoporosis (brittle bones).  Hepatitis C: Get tested at least once in your life.  Abdominal aortic aneurysm screening: Talk to your doctor about having this screening if you:  Have ever smoked; and  Are biologically male; and  Are between the ages of 65 and 75.  STIs (sexually transmitted infections)  Before age 24: Ask your care team if you should be screened for STIs.  After age 24: Get screened for STIs if you're at risk. You are at risk for STIs (including HIV) if:  You are sexually active with more than one person.  You don't use condoms every time.  You or a partner was diagnosed with a sexually transmitted infection.  If you are at risk for HIV, ask about PrEP medicine to prevent HIV.  Get tested for HIV at least once in your life, whether you are at risk for HIV or not.  Cancer screening tests  Cervical cancer screening: If you have a cervix, begin getting regular cervical cancer screening tests at age 21. Most people who have regular screenings with normal results can stop after age 65. Talk about this with your provider.  Breast cancer scan (mammogram): If you've ever had breasts, begin having regular mammograms starting at age 40. This is a scan to check for breast cancer.  Colon cancer screening: It is important to start screening for colon cancer at age 45.  Have a colonoscopy test every 10 years (or more often if you're at risk) Or, ask your provider about stool tests like a FIT test every year or Cologuard test every 3 years.  To learn more about your testing options, visit: www.[employer]/639607.pdf.  For help making a decision, visit: dell/qh94373.  Prostate cancer screening test: If you have a prostate and are age 55  to 69, ask your provider if you would benefit from a yearly prostate cancer screening test.  Lung cancer screening: If you are a current or former smoker age 50 to 80, ask your care team if ongoing lung cancer screenings are right for you.  For informational purposes only. Not to replace the advice of your health care provider. Copyright   2023 [employer]. All rights reserved. Clinically reviewed by the [facility] Transitions Program. Gocella 621986 - REV 04/24.

## 2024-06-05 NOTE — TELEPHONE ENCOUNTER
Faxed referral order for Orthopedics (knee) to [facility] - [facility] Causey at [phone]. Confirmation was received.    Patient is requesting to see Dr Gene Crouch.

## 2024-07-22 ENCOUNTER — MYC REFILL (OUTPATIENT)
Dept: FAMILY MEDICINE | Facility: CLINIC | Age: 59
End: 2024-07-22
Payer: COMMERCIAL

## 2024-07-22 DIAGNOSIS — G43.901 MIGRAINE WITH STATUS MIGRAINOSUS, NOT INTRACTABLE, UNSPECIFIED MIGRAINE TYPE: ICD-10-CM

## 2024-07-22 RX ORDER — ZOLMITRIPTAN 5 MG/1
5 TABLET, ORALLY DISINTEGRATING ORAL
Qty: 12 TABLET | Refills: 0 | Status: SHIPPED | OUTPATIENT
Start: 2024-07-22

## 2024-07-25 DIAGNOSIS — F51.02 TRANSIENT INSOMNIA: ICD-10-CM

## 2024-07-26 RX ORDER — TRAZODONE HYDROCHLORIDE 50 MG/1
TABLET, FILM COATED ORAL
Qty: 90 TABLET | Refills: 2 | Status: SHIPPED | OUTPATIENT
Start: 2024-07-26

## 2024-07-31 DIAGNOSIS — M54.9 CHRONIC BACK PAIN, UNSPECIFIED BACK LOCATION, UNSPECIFIED BACK PAIN LATERALITY: ICD-10-CM

## 2024-07-31 DIAGNOSIS — G89.29 CHRONIC BACK PAIN, UNSPECIFIED BACK LOCATION, UNSPECIFIED BACK PAIN LATERALITY: ICD-10-CM

## 2024-07-31 RX ORDER — TRAMADOL HYDROCHLORIDE 50 MG/1
50 TABLET ORAL 2 TIMES DAILY PRN
Qty: 60 TABLET | Refills: 1 | Status: SHIPPED | OUTPATIENT
Start: 2024-07-31

## 2024-08-13 ENCOUNTER — PATIENT OUTREACH (OUTPATIENT)
Dept: FAMILY MEDICINE | Facility: CLINIC | Age: 59
End: 2024-08-13
Payer: COMMERCIAL

## 2024-08-13 NOTE — LETTER
[facility] [place]  08/13/24  Patient: Angelique Rojas  YOB: 1965  Medical Record Number: [id number]                                                                                  Non-Opioid Controlled Substance Agreement    This is an agreement between you and your provider regarding safe and appropriate use of controlled substances prescribed by your care team. Controlled substances are?medicines that can cause physical and mental dependence (abuse).     There are strict laws about having and using these medicines. We here at [facility] are  committed to working with you in your efforts to get better. To support you in this work, we'll help you schedule regular office appointments for medicine refills. If we must cancel or change your appointment for any reason, we'll make sure you have enough medicine to last until your next appointment.     As a Provider, I will:   Listen carefully to your concerns while treating you with respect.   Recommend a treatment plan that I believe is in your best interest and may involve therapies other than medicine.    Talk with you often about the possible benefits and the risk of harm of any medicine that we prescribe for you.  Assess the safety of this medicine and check how well it works.    Provide a plan on how to taper (discontinue or go off) using this medicine if the decision is made to stop its use.      ::  As a Patient, I understand controlled substances:     Are prescribed by my care provider to help me function or work and manage my condition(s).?  Are strong medicines and can cause serious side effects.     Need to be taken exactly as prescribed.?Combining controlled substances with certain medicines or chemicals (such as illegal drugs, alcohol, sedatives, sleeping pills, and benzodiazepines) can be dangerous or even fatal.? If I stop taking my medicines suddenly, I may have severe withdrawal symptoms.     The risks,  benefits, and side effects of these medicine(s) were explained to me. I agree that:    I will take part in other treatments as advised by my care team. This may be psychiatry or counseling, physical therapy, behavioral therapy, group treatment or a referral to specialist.    I will keep all my appointments and understand this is part of the monitoring of controlled substances.?My care team may require an office visit for EVERY controlled substance refill. If I miss appointments or don t follow instructions, my care team may stop my medicine    I will take my medicines as prescribed. I will not change the dose or schedule unless my care team tells me to. There will be no refills if I run out early.      I may be asked to come to the clinic and complete a urine drug test or complete a pill count. If I don t give a urine sample or participate in a pill count, the care team may stop my medicine.    I will only receive controlled substance prescriptions from this clinic. If I am treated by another provider, I will tell them that I am taking controlled substances and that I have a treatment agreement with this provider. I will inform my [facility] care team within one business day if I am given a prescription for any controlled substance by another healthcare provider. My [facility] care team can contact other providers and pharmacists about my use of any medicines.    It is up to me to make sure that I don't run out of my medicines on weekends or holidays.?If my care team is willing to refill my prescription without a visit, I must request refills only during office hours. Refills may take up to 3 business days to process. I will use one pharmacy to fill all my controlled substance prescriptions. I will notify the clinic about any changes to my insurance or medicine availability.    I am responsible for my prescriptions. If the medicine/prescription is lost, stolen or destroyed, it will not be replaced.?I  also agree not to share controlled substance medicines with anyone.     I am aware I should not use any illegal or recreational drugs. I agree not to drink alcohol unless my care team says I can.     If I enroll in the [place] Medical Cannabis program, I will tell my care team before my next refill.    I will tell my care team right away if I become pregnant, have a new medical problem treated outside of my regular clinic, or have a change in my medicines.     I understand that this medicine can affect my thinking, judgment and reaction time.? Alcohol and drugs affect the brain and body, which can affect the safety of my driving. Being under the influence of alcohol or drugs can affect my decision-making, behaviors, personal safety and the safety of others. Driving while impaired (DWI) can occur if a person is driving, operating or in physical control of a car, motorcycle, boat, snowmobile, ATV, motorbike, off-road vehicle or any other motor vehicle ([place] Statute 169A.20). I understand the risk if I choose to drive or operate any vehicle or machinery.    I understand that if I do not follow any of the conditions above, my prescriptions or treatment may be stopped or changed.   I agree that my provider, clinic care team and pharmacy may work with any city, state or federal law enforcement agency that investigates the misuse, sale or other diversion of my controlled medicine. I will allow my provider to discuss my care with, or share a copy of, this agreement with any other treating provider, pharmacy or emergency room where I receive care.     I have read this agreement and have asked questions about anything I did not understand.    ________________________________________________________  Patient Signature - Angelique Rojas     ___________________                   Date     ________________________________________________________  Provider Signature - Loraine Buchanan, NP       ___________________                    Date     ________________________________________________________  Witness Signature (required if provider not present while patient signing)          ___________________                   Date

## 2024-08-13 NOTE — TELEPHONE ENCOUNTER
Forms/Letter Request    Type of form/letter: CSA (Controlled Substance Agreement)     Do we have the form/letter: Yes: Agreement letter will be mailed to patient.    Who is the form from? [facility]     Where did/will the form come from? Form will be mailed to patient. Needs to be signed and returned to clinic.    When is form/letter needed by: ASAP    How would you like the form/letter returned:  To be mailed back or dropped off at the clinic.

## 2024-08-20 DIAGNOSIS — J20.9 ACUTE BRONCHITIS, UNSPECIFIED ORGANISM: Primary | ICD-10-CM

## 2024-08-21 RX ORDER — BUDESONIDE 90 UG/1
2 AEROSOL, POWDER RESPIRATORY (INHALATION) 2 TIMES DAILY
Qty: 3 EACH | Refills: 2 | Status: SHIPPED | OUTPATIENT
Start: 2024-08-21

## 2024-08-23 ENCOUNTER — MYC MEDICAL ADVICE (OUTPATIENT)
Dept: EDUCATION SERVICES | Facility: CLINIC | Age: 59
End: 2024-08-23
Payer: COMMERCIAL

## 2024-08-23 DIAGNOSIS — R63.2 BINGE EATING: Primary | ICD-10-CM

## 2024-08-23 DIAGNOSIS — E66.09 OBESITY DUE TO EXCESS CALORIES WITHOUT SERIOUS COMORBIDITY, UNSPECIFIED CLASSIFICATION: ICD-10-CM

## 2024-08-26 ENCOUNTER — MEDICAL CORRESPONDENCE (OUTPATIENT)
Dept: HEALTH INFORMATION MANAGEMENT | Facility: CLINIC | Age: 59
End: 2024-08-26
Payer: COMMERCIAL

## 2024-10-16 ENCOUNTER — TELEPHONE (OUTPATIENT)
Dept: FAMILY MEDICINE | Facility: CLINIC | Age: 59
End: 2024-10-16
Payer: COMMERCIAL

## 2024-10-16 DIAGNOSIS — M17.11 PRIMARY OSTEOARTHRITIS OF RIGHT KNEE: Primary | ICD-10-CM

## 2024-10-16 DIAGNOSIS — M25.561 CHRONIC PAIN OF RIGHT KNEE: ICD-10-CM

## 2024-10-16 DIAGNOSIS — G89.29 CHRONIC PAIN OF RIGHT KNEE: ICD-10-CM

## 2024-10-16 NOTE — TELEPHONE ENCOUNTER
General Call      Reason for Call:  Joanna from [employer]    What are your questions or concerns:  Ortho referral from June needs to be submitted to Insurance.    Date of last appointment with provider: 6/5/2024

## 2024-10-16 NOTE — TELEPHONE ENCOUNTER
It was entered on 6/5/24 on the [facility] Portal.  Ref# [id number].    Cinthia CIFUENTES Referrals

## 2024-10-16 NOTE — TELEPHONE ENCOUNTER
10/20/2024--I have placed the referrals to Ortho and to Physical therapy.  Order/Referral Request    Who is requesting: [employer] [employer]- pt insurance    Orders being requested: PT referral & Orthopedic Surgery referral    Reason service is needed/diagnosis: PT for R knee pain & R knee replacement    When are orders needed by: asap; OK TO WAIT FOR NCB return on 10/21/2024    Has this been discussed with Provider: Yes    Does patient have a preference on a Group/Provider/Facility? [place]    PCP needs to place referral and have submitted to insurance; once orders placed please route to [facility] Referrals to submit to insurance for Retroactive dates for PT date of 9/19/2024 and Ortho Surgery date of 9/16/2024

## 2024-10-21 ENCOUNTER — PATIENT OUTREACH (OUTPATIENT)
Dept: CARE COORDINATION | Facility: CLINIC | Age: 59
End: 2024-10-21
Payer: COMMERCIAL

## 2024-10-23 ENCOUNTER — PATIENT OUTREACH (OUTPATIENT)
Dept: CARE COORDINATION | Facility: CLINIC | Age: 59
End: 2024-10-23
Payer: COMMERCIAL

## 2024-10-25 NOTE — TELEPHONE ENCOUNTER
Received call from [facility] with [employer] - referral for PT needs to be dated 9/16/24. Current referral starts 9/19/24 but patient was already receiving services on 9/16/24.

## 2024-10-28 NOTE — TELEPHONE ENCOUNTER
Can someone from the Referral Coordinators team contact Joanna with [employer] to clarify the dates that the referrals need as the Retroactive dates.

## 2024-10-28 NOTE — TELEPHONE ENCOUNTER
Referrals are updated on the [facility] Portal.    Cinthia  [facility] Referrals

## 2024-11-01 ENCOUNTER — MYC REFILL (OUTPATIENT)
Dept: FAMILY MEDICINE | Facility: CLINIC | Age: 59
End: 2024-11-01
Payer: COMMERCIAL

## 2024-11-01 DIAGNOSIS — F33.1 MAJOR DEPRESSIVE DISORDER, RECURRENT EPISODE, MODERATE (H): ICD-10-CM

## 2024-11-01 DIAGNOSIS — F41.1 GENERALIZED ANXIETY DISORDER: ICD-10-CM

## 2024-11-01 DIAGNOSIS — M54.9 CHRONIC BACK PAIN, UNSPECIFIED BACK LOCATION, UNSPECIFIED BACK PAIN LATERALITY: ICD-10-CM

## 2024-11-01 DIAGNOSIS — G89.29 CHRONIC BACK PAIN, UNSPECIFIED BACK LOCATION, UNSPECIFIED BACK PAIN LATERALITY: ICD-10-CM

## 2024-11-01 NOTE — TELEPHONE ENCOUNTER
Routing refill request to provider for review/approval because:  Drug not on the [facility] refill protocol   Drug interaction warning    Tramadol last written 7/31/24 qty 60 with 1 refill  Escitalopram last written 11/3/23 qty 90 with 3 refills  Bupropion last written 6/5/24 qty 90 no refills  Last OV 6/5/24

## 2024-11-04 DIAGNOSIS — F41.1 GENERALIZED ANXIETY DISORDER: ICD-10-CM

## 2024-11-04 RX ORDER — ESCITALOPRAM OXALATE 20 MG/1
TABLET ORAL
Qty: 90 TABLET | Refills: 1 | Status: SHIPPED | OUTPATIENT
Start: 2024-11-04 | End: 2024-11-05

## 2024-11-04 RX ORDER — TRAMADOL HYDROCHLORIDE 50 MG/1
50 TABLET ORAL 2 TIMES DAILY PRN
Qty: 60 TABLET | Refills: 1 | OUTPATIENT
Start: 2024-11-04

## 2024-11-04 RX ORDER — BUPROPION HYDROCHLORIDE 100 MG/1
100 TABLET, EXTENDED RELEASE ORAL DAILY
Qty: 90 TABLET | Refills: 0 | OUTPATIENT
Start: 2024-11-04

## 2024-11-04 RX ORDER — ESCITALOPRAM OXALATE 20 MG/1
TABLET ORAL
Qty: 90 TABLET | Refills: 0 | OUTPATIENT
Start: 2024-11-04

## 2024-11-04 ASSESSMENT — ANXIETY QUESTIONNAIRES
6. BECOMING EASILY ANNOYED OR IRRITABLE: NOT AT ALL
7. FEELING AFRAID AS IF SOMETHING AWFUL MIGHT HAPPEN: NOT AT ALL
3. WORRYING TOO MUCH ABOUT DIFFERENT THINGS: NOT AT ALL
8. IF YOU CHECKED OFF ANY PROBLEMS, HOW DIFFICULT HAVE THESE MADE IT FOR YOU TO DO YOUR WORK, TAKE CARE OF THINGS AT HOME, OR GET ALONG WITH OTHER PEOPLE?: NOT DIFFICULT AT ALL
IF YOU CHECKED OFF ANY PROBLEMS ON THIS QUESTIONNAIRE, HOW DIFFICULT HAVE THESE PROBLEMS MADE IT FOR YOU TO DO YOUR WORK, TAKE CARE OF THINGS AT HOME, OR GET ALONG WITH OTHER PEOPLE: NOT DIFFICULT AT ALL
5. BEING SO RESTLESS THAT IT IS HARD TO SIT STILL: NOT AT ALL
GAD7 TOTAL SCORE: 0
GAD7 TOTAL SCORE: 0
4. TROUBLE RELAXING: NOT AT ALL
2. NOT BEING ABLE TO STOP OR CONTROL WORRYING: NOT AT ALL
1. FEELING NERVOUS, ANXIOUS, OR ON EDGE: NOT AT ALL
GAD7 TOTAL SCORE: 0
7. FEELING AFRAID AS IF SOMETHING AWFUL MIGHT HAPPEN: NOT AT ALL

## 2024-11-05 ENCOUNTER — VIRTUAL VISIT (OUTPATIENT)
Dept: FAMILY MEDICINE | Facility: CLINIC | Age: 59
End: 2024-11-05
Payer: COMMERCIAL

## 2024-11-05 DIAGNOSIS — G89.29 CHRONIC BACK PAIN, UNSPECIFIED BACK LOCATION, UNSPECIFIED BACK PAIN LATERALITY: ICD-10-CM

## 2024-11-05 DIAGNOSIS — M54.9 CHRONIC BACK PAIN, UNSPECIFIED BACK LOCATION, UNSPECIFIED BACK PAIN LATERALITY: ICD-10-CM

## 2024-11-05 DIAGNOSIS — F33.1 MAJOR DEPRESSIVE DISORDER, RECURRENT EPISODE, MODERATE (H): ICD-10-CM

## 2024-11-05 DIAGNOSIS — F51.02 TRANSIENT INSOMNIA: ICD-10-CM

## 2024-11-05 DIAGNOSIS — F41.1 GENERALIZED ANXIETY DISORDER: ICD-10-CM

## 2024-11-05 DIAGNOSIS — R63.2 BINGE EATING: ICD-10-CM

## 2024-11-05 PROBLEM — M25.561 CHRONIC PAIN OF RIGHT KNEE: Status: RESOLVED | Noted: 2019-02-04 | Resolved: 2024-11-05

## 2024-11-05 PROBLEM — M17.9 OSTEOARTHRITIS OF KNEE: Status: RESOLVED | Noted: 2022-02-14 | Resolved: 2024-11-05

## 2024-11-05 PROBLEM — E66.9 OBESITY: Status: RESOLVED | Noted: 2019-11-01 | Resolved: 2024-11-05

## 2024-11-05 PROCEDURE — 99214 OFFICE O/P EST MOD 30 MIN: CPT | Mod: 95 | Performed by: NURSE PRACTITIONER

## 2024-11-05 PROCEDURE — G2211 COMPLEX E/M VISIT ADD ON: HCPCS | Mod: 95 | Performed by: NURSE PRACTITIONER

## 2024-11-05 RX ORDER — ESCITALOPRAM OXALATE 20 MG/1
TABLET ORAL
Qty: 90 TABLET | Refills: 3 | Status: SHIPPED | OUTPATIENT
Start: 2024-11-05

## 2024-11-05 RX ORDER — TRAZODONE HYDROCHLORIDE 50 MG/1
50 TABLET, FILM COATED ORAL AT BEDTIME
Qty: 90 TABLET | Refills: 3 | Status: SHIPPED | OUTPATIENT
Start: 2024-11-05 | End: 2025-02-03

## 2024-11-05 RX ORDER — BUPROPION HYDROCHLORIDE 100 MG/1
100 TABLET, EXTENDED RELEASE ORAL DAILY
Qty: 90 TABLET | Refills: 3 | Status: SHIPPED | OUTPATIENT
Start: 2024-11-05

## 2024-11-05 RX ORDER — TRAMADOL HYDROCHLORIDE 50 MG/1
50 TABLET ORAL 2 TIMES DAILY PRN
Qty: 40 TABLET | Refills: 1 | Status: SHIPPED | OUTPATIENT
Start: 2024-11-05

## 2024-11-05 RX ORDER — AMOXICILLIN 500 MG/1
CAPSULE ORAL
COMMUNITY
Start: 2024-10-25

## 2024-11-05 NOTE — PROGRESS NOTES
Karlene is a 59 year old who is being evaluated via a billable video visit.    How would you like to obtain your AVS? MyChart  If the video visit is dropped, the invitation should be resent by: Text to cell phone: [phone]  Will anyone else be joining your video visit? No      Assessment & Plan     (F33.1) Major depressive disorder, recurrent episode, moderate (H)  Comment:   Plan: buPROPion (WELLBUTRIN SR) 100 MG 12 hr tablet        Feels this is well controlled    (F51.02) Transient insomnia  Comment:   Plan: traZODone (DESYREL) 50 MG tablet        Sleeps better with the trazodone than without    (M54.9,  G89.29) Chronic back pain, unspecified back location, unspecified back pain laterality  Comment: she has stopped the gabapentin, it was giving her headaches. She has chronic back pain that is worsening since her knee replacement this past September. Please that the knee pain is resolved, the knee is tight, she is working in therapy. PDMP check shows that her orthopedic surgeons were ofcourse prescribing oxy and tramadol in her post operative periods.  Counseled that we should explore other meds for chronic pain other than the tramadol, she will consider.    Plan: traMADol (ULTRAM) 50 MG tablet        Uses one each morning and sometimes later in the day. Hx of gi bleed, can not use NSAIDS    (R63.2) Binge eating  Comment:   Plan: Semaglutide-Weight Management (WEGOVY) 1         MG/0.5ML pen            (F41.1) Generalized anxiety disorder  Comment:   Plan: escitalopram (LEXAPRO) 20 MG tablet        controlled                Subjective   Karlene is a 59 year old, presenting for the following health issues: med check and refills  Had knee replacement in Sept, doing well  GABAPENTIN stopped due to HA  USING TRAMADOL 1 TAB MORNING AND OCCASIONAL IN EVENING  Recheck Medication      11/5/2024     7:09 AM   Additional Questions   Roomed by priscilla simmons   Accompanied by self     History of Present Illness       Back Pain:  She  presents for follow up of back pain. Patient's back pain is a chronic problem.  Location of back pain:  Right lower back and left lower back  Description of back pain: burning and gnawing  Back pain spreads: nowhere    Since patient first noticed back pain, pain is: always present, but gets better and worse  Does back pain interfere with her job:  No       Mental Health Follow-up:  Patient presents to follow-up on Depression & Anxiety.Patient's depression since last visit has been:  Good  The patient is not having other symptoms associated with depression.  Patient's anxiety since last visit has been:  Good  The patient is not having other symptoms associated with anxiety.  Any significant life events: No  Patient is not feeling anxious or having panic attacks.  Patient has no concerns about alcohol or drug use.    She eats 2-3 servings of fruits and vegetables daily.She consumes 4 sweetened beverage(s) daily.She exercises with enough effort to increase her heart rate 30 to 60 minutes per day.  She exercises with enough effort to increase her heart rate 7 days per week.   She is taking medications regularly.     GAD7 score: 0   The longitudinal plan of care for the diagnosis(es)/condition(s) as documented were addressed during this visit. Due to the added complexity in care, I will continue to support Karlene in the subsequent management and with ongoing continuity of care.                Objective           Vitals:  No vitals were obtained today due to virtual visit.    Physical Exam   GENERAL: alert and no distress  EYES: Eyes grossly normal to inspection.  No discharge or erythema, or obvious scleral/conjunctival abnormalities.  RESP: No audible wheeze, cough, or visible cyanosis.    SKIN: Visible skin clear. No significant rash, abnormal pigmentation or lesions.  NEURO: Cranial nerves grossly intact.  Mentation and speech appropriate for age.  PSYCH: Appropriate affect, tone, and pace of words          Video-Visit  Details    Type of service:  Video Visit   Originating Location (pt. Location): Home    Distant Location (provider location):  On-site  Platform used for Video Visit: Jenelle  Signed Electronically by: Loraine Buchanan NP

## 2024-11-18 NOTE — TELEPHONE ENCOUNTER
All pt belongings given back to patient   Left message to call back or check the [employer] message.  Thank You,    Leidy CARRILLO RN  [facility]  [phone]

## 2024-12-01 DIAGNOSIS — G43.901 MIGRAINE WITH STATUS MIGRAINOSUS, NOT INTRACTABLE, UNSPECIFIED MIGRAINE TYPE: ICD-10-CM

## 2024-12-02 RX ORDER — ZOLMITRIPTAN 5 MG/1
TABLET, ORALLY DISINTEGRATING ORAL
Qty: 12 TABLET | Refills: 0 | Status: SHIPPED | OUTPATIENT
Start: 2024-12-02

## 2024-12-30 ENCOUNTER — OFFICE VISIT (OUTPATIENT)
Dept: FAMILY MEDICINE | Facility: CLINIC | Age: 59
End: 2024-12-30
Payer: COMMERCIAL

## 2024-12-30 VITALS
SYSTOLIC BLOOD PRESSURE: 142 MMHG | BODY MASS INDEX: 26.87 KG/M2 | OXYGEN SATURATION: 100 % | TEMPERATURE: 98.7 F | DIASTOLIC BLOOD PRESSURE: 91 MMHG | WEIGHT: 167.2 LBS | HEART RATE: 60 BPM | RESPIRATION RATE: 16 BRPM | HEIGHT: 66 IN

## 2024-12-30 DIAGNOSIS — F51.02 TRANSIENT INSOMNIA: ICD-10-CM

## 2024-12-30 DIAGNOSIS — F41.1 GENERALIZED ANXIETY DISORDER: ICD-10-CM

## 2024-12-30 DIAGNOSIS — M54.2 CHRONIC NECK PAIN: ICD-10-CM

## 2024-12-30 DIAGNOSIS — M54.9 CHRONIC BACK PAIN, UNSPECIFIED BACK LOCATION, UNSPECIFIED BACK PAIN LATERALITY: ICD-10-CM

## 2024-12-30 DIAGNOSIS — Z23 NEED FOR VACCINATION: ICD-10-CM

## 2024-12-30 DIAGNOSIS — G89.29 CHRONIC BACK PAIN, UNSPECIFIED BACK LOCATION, UNSPECIFIED BACK PAIN LATERALITY: ICD-10-CM

## 2024-12-30 DIAGNOSIS — F33.1 MAJOR DEPRESSIVE DISORDER, RECURRENT EPISODE, MODERATE (H): ICD-10-CM

## 2024-12-30 DIAGNOSIS — Z23 ENCOUNTER FOR IMMUNIZATION: ICD-10-CM

## 2024-12-30 DIAGNOSIS — G89.29 CHRONIC NECK PAIN: ICD-10-CM

## 2024-12-30 DIAGNOSIS — R19.7 DIARRHEA, UNSPECIFIED TYPE: Primary | ICD-10-CM

## 2024-12-30 DIAGNOSIS — K27.4 GASTROINTESTINAL HEMORRHAGE ASSOCIATED WITH PEPTIC ULCER: ICD-10-CM

## 2024-12-30 LAB
AMPHETAMINES UR QL: NOT DETECTED
BARBITURATES UR QL SCN: NOT DETECTED
BASOPHILS # BLD AUTO: 0 10E3/UL (ref 0–0.2)
BASOPHILS NFR BLD AUTO: 0 %
BENZODIAZ UR QL SCN: NOT DETECTED
BUPRENORPHINE UR QL: NOT DETECTED
CANNABINOIDS UR QL: NOT DETECTED
COCAINE UR QL SCN: NOT DETECTED
D-METHAMPHET UR QL: NOT DETECTED
EOSINOPHIL # BLD AUTO: 0.2 10E3/UL (ref 0–0.7)
EOSINOPHIL NFR BLD AUTO: 2 %
ERYTHROCYTE [DISTWIDTH] IN BLOOD BY AUTOMATED COUNT: 12.3 % (ref 10–15)
HCT VFR BLD AUTO: 37.9 % (ref 35–47)
HGB BLD-MCNC: 12.1 G/DL (ref 11.7–15.7)
IMM GRANULOCYTES # BLD: 0 10E3/UL
IMM GRANULOCYTES NFR BLD: 0 %
LYMPHOCYTES # BLD AUTO: 2.3 10E3/UL (ref 0.8–5.3)
LYMPHOCYTES NFR BLD AUTO: 30 %
MCH RBC QN AUTO: 27.8 PG (ref 26.5–33)
MCHC RBC AUTO-ENTMCNC: 31.9 G/DL (ref 31.5–36.5)
MCV RBC AUTO: 87 FL (ref 78–100)
METHADONE UR QL SCN: NOT DETECTED
MONOCYTES # BLD AUTO: 0.5 10E3/UL (ref 0–1.3)
MONOCYTES NFR BLD AUTO: 6 %
NEUTROPHILS # BLD AUTO: 4.7 10E3/UL (ref 1.6–8.3)
NEUTROPHILS NFR BLD AUTO: 61 %
OPIATES UR QL SCN: NOT DETECTED
OXYCODONE UR QL SCN: NOT DETECTED
PCP UR QL SCN: NOT DETECTED
PLATELET # BLD AUTO: 327 10E3/UL (ref 150–450)
RBC # BLD AUTO: 4.35 10E6/UL (ref 3.8–5.2)
TRICYCLICS UR QL SCN: NOT DETECTED
WBC # BLD AUTO: 7.7 10E3/UL (ref 4–11)

## 2024-12-30 PROCEDURE — 82150 ASSAY OF AMYLASE: CPT | Performed by: NURSE PRACTITIONER

## 2024-12-30 PROCEDURE — 36415 COLL VENOUS BLD VENIPUNCTURE: CPT | Performed by: NURSE PRACTITIONER

## 2024-12-30 PROCEDURE — 85025 COMPLETE CBC W/AUTO DIFF WBC: CPT | Mod: QW | Performed by: NURSE PRACTITIONER

## 2024-12-30 PROCEDURE — 90471 IMMUNIZATION ADMIN: CPT | Performed by: NURSE PRACTITIONER

## 2024-12-30 PROCEDURE — 90673 RIV3 VACCINE NO PRESERV IM: CPT | Performed by: NURSE PRACTITIONER

## 2024-12-30 PROCEDURE — 99214 OFFICE O/P EST MOD 30 MIN: CPT | Mod: 25 | Performed by: NURSE PRACTITIONER

## 2024-12-30 PROCEDURE — 80053 COMPREHEN METABOLIC PANEL: CPT | Performed by: NURSE PRACTITIONER

## 2024-12-30 PROCEDURE — 80306 DRUG TEST PRSMV INSTRMNT: CPT | Performed by: NURSE PRACTITIONER

## 2024-12-30 RX ORDER — TRAMADOL HYDROCHLORIDE 50 MG/1
50 TABLET ORAL 2 TIMES DAILY PRN
Qty: 60 TABLET | Refills: 2 | Status: SHIPPED | OUTPATIENT
Start: 2024-12-30

## 2024-12-30 NOTE — PATIENT INSTRUCTIONS
Ok to increase imodium up to 8 tabs/day  After you give stool samples, start probiotic--floragen, culturelle.

## 2024-12-30 NOTE — PROGRESS NOTES
Assessment & Plan     (R19.7) Diarrhea, unspecified type  (primary encounter diagnosis)  Comment:   Plan: Comprehensive metabolic panel (BMP + Alb, Alk         Phos, ALT, AST, Total. Bili, TP), CBC with         Platelets & Differential, Amylase, Enteric         Bacteria and Virus Panel by [facility] Stool,         Helicobacter pylori Antigen Stool          Here to discuss diarrhea that has been ongoing for 2 weeks with no known cause.  She has a history of irritable bowel but usually does not present like this.  She is using between 2 and 3 Imodium per day possibly having 10 stools per day.  Denies any blood in stool.  She has had a history of peptic ulcer in the past and uses omeprazole daily since that was identified.  She uses no NSAIDs.  She has had no fever or vomiting with this and other than a little cramping after she eats something she really does not have any abdominal pain.  Agrees to stool samples and to holding Wegovy for 2 weeks until etiology is identified and symptoms have resolved.    (K27.4) Gastrointestinal hemorrhage associated with peptic ulcer  Comment:   Plan: omeprazole (PRILOSEC) 20 MG DR capsule            (M54.2,  G89.29) Chronic neck pain  Comment:   Plan: Urine Drug Screen [facility]        She also would like to get a refill on her tramadol today , she uses this twice a day for neck and back pain.  She has been appropriate with controlled substance agreement.  And agreeable to urine drug screening today.    (F41.1) Generalized anxiety disorder  Comment:   Plan: Well-controlled    (F51.02) Transient insomnia  Comment:   Plan: Well-controlled    (F33.1) Major depressive disorder, recurrent episode, moderate (H)  Comment:   Plan: Well-controlled    (M54.9,  G89.29) Chronic back pain, unspecified back location, unspecified back pain laterality  Comment: As above  Plan: traMADol (ULTRAM) 50 MG tablet            (Z23) Need for vaccination  Comment:   Plan: Agreeable to flu shot, has had COVID shot,  "will return for pneumonia            BMI  Estimated body mass index is 26.99 kg/m  as calculated from the following:    Height as of this encounter: 1.676 m (5' 6\").    Weight as of this encounter: 75.8 kg (167 lb 3.2 oz).             Cari Soler is a 59 year old, presenting for the following health issues:    Diarrhea--described as water-- diarrhea, no blood in stool. Approx 10/day  Imodium used, between 1-3 per day  Has not traveled, no well water    Started Wegovy 1 year ago, had rare diarrhea or nausea a couple days later. Has lost at least #35  Held Wegovy and didn't seem to help so re started it    Pmh GI bleed  Is never constipated    Uses tramadol bid, can't take NSAID due to GI bleed  Using tylenol bid  Gastrointestinal Problem (Patient having diarrhea ongoing x 2 weeks. Everything she eats she needs to go to the bathroom-trying Imodium and bland diet-bananas, toast, rice. ) and Recheck Medication (Has appointment scheduled 1/9-if possible could do med check today for Tramadol. Urine lab.)      12/30/2024     4:18 PM   Additional Questions   Roomed by Gaby OLIVAREZ   Accompanied by Self     History of Present Illness       Reason for visit:  Ongoing diarrhea whenever I eat anything. Also, medication review  Symptom onset:  1-2 weeks ago  Symptoms include:  Intermittent diarrhea with and without eating. Also used immodium for multiple days with no improvement  Symptom intensity:  Moderate  Symptom progression:  Staying the same  Had these symptoms before:  Yes  Has tried/received treatment for these symptoms:  No  What makes it worse:  Not really  What makes it better:  I feel okay just need to be close to a bathroom at all times   She is taking medications regularly.                     Objective    BP (!) 142/91 (BP Location: Right arm, Patient Position: Sitting, Cuff Size: Adult Large)   Pulse 60   Temp 98.7  F (37.1  C)   Resp 16   Ht 1.676 m (5' 6\")   Wt 75.8 kg (167 lb 3.2 oz)   LMP  (LMP " Unknown)   SpO2 100%   BMI 26.99 kg/m    Body mass index is 26.99 kg/m .  Physical Exam   GENERAL: alert and no distress  EYES: Eyes grossly normal to inspection, PERRL and conjunctivae and sclerae normal  NECK: no adenopathy, no asymmetry, masses, or scars  RESP: lungs clear to auscultation - no rales, rhonchi or wheezes  CV: regular rate and rhythm, normal S1 S2, no S3 or S4, no murmur, click or rub, no peripheral edema  ABDOMEN: soft, nontender, no hepatosplenomegaly, no masses and bowel sounds normal  MS: no gross musculoskeletal defects noted, no edema  SKIN: no suspicious lesions or rashes  NEURO: Normal strength and tone, mentation intact and speech normal  PSYCH: mentation appears normal, affect normal/bright          The longitudinal plan of care for the diagnosis(es)/condition(s) as documented were addressed during this visit. Due to the added complexity in care, I will continue to support Karlene in the subsequent management and with ongoing continuity of care.    Signed Electronically by: Loraine Buchanan NP

## 2024-12-31 ENCOUNTER — APPOINTMENT (OUTPATIENT)
Dept: LAB | Facility: CLINIC | Age: 59
End: 2024-12-31
Payer: COMMERCIAL

## 2024-12-31 LAB
ADV 40+41 DNA STL QL NAA+NON-PROBE: NEGATIVE
ALBUMIN SERPL BCG-MCNC: 4.2 G/DL (ref 3.5–5.2)
ALP SERPL-CCNC: 119 U/L (ref 40–150)
ALT SERPL W P-5'-P-CCNC: 11 U/L (ref 0–50)
AMYLASE SERPL-CCNC: 65 U/L (ref 28–100)
ANION GAP SERPL CALCULATED.3IONS-SCNC: 12 MMOL/L (ref 7–15)
AST SERPL W P-5'-P-CCNC: 16 U/L (ref 0–45)
ASTRO TYP 1-8 RNA STL QL NAA+NON-PROBE: NEGATIVE
BILIRUB SERPL-MCNC: 0.3 MG/DL
BUN SERPL-MCNC: 8.2 MG/DL (ref 6–20)
C CAYETANENSIS DNA STL QL NAA+NON-PROBE: NEGATIVE
CALCIUM SERPL-MCNC: 9.1 MG/DL (ref 8.8–10.4)
CAMPYLOBACTER DNA SPEC NAA+PROBE: NEGATIVE
CHLORIDE SERPL-SCNC: 105 MMOL/L (ref 98–107)
CREAT SERPL-MCNC: 0.79 MG/DL (ref 0.51–0.95)
CRYPTOSP DNA STL QL NAA+NON-PROBE: NEGATIVE
E COLI O157 DNA STL QL NAA+NON-PROBE: NORMAL
E HISTOLYT DNA STL QL NAA+NON-PROBE: NEGATIVE
EAEC ASTA GENE ISLT QL NAA+PROBE: NEGATIVE
EC STX1+STX2 GENES STL QL NAA+NON-PROBE: NEGATIVE
EGFRCR SERPLBLD CKD-EPI 2021: 87 ML/MIN/1.73M2
EPEC EAE GENE STL QL NAA+NON-PROBE: NEGATIVE
ETEC LTA+ST1A+ST1B TOX ST NAA+NON-PROBE: NEGATIVE
G LAMBLIA DNA STL QL NAA+NON-PROBE: NEGATIVE
GLUCOSE SERPL-MCNC: 87 MG/DL (ref 70–99)
HCO3 SERPL-SCNC: 24 MMOL/L (ref 22–29)
NOROVIRUS GI+II RNA STL QL NAA+NON-PROBE: NEGATIVE
P SHIGELLOIDES DNA STL QL NAA+NON-PROBE: NEGATIVE
POTASSIUM SERPL-SCNC: 3.6 MMOL/L (ref 3.4–5.3)
PROT SERPL-MCNC: 7 G/DL (ref 6.4–8.3)
RVA RNA STL QL NAA+NON-PROBE: NEGATIVE
SALMONELLA SP RPOD STL QL NAA+PROBE: NEGATIVE
SAPO I+II+IV+V RNA STL QL NAA+NON-PROBE: NEGATIVE
SHIGELLA SP+EIEC IPAH ST NAA+NON-PROBE: NEGATIVE
SODIUM SERPL-SCNC: 141 MMOL/L (ref 135–145)
V CHOLERAE DNA SPEC QL NAA+PROBE: NEGATIVE
VIBRIO DNA SPEC NAA+PROBE: NEGATIVE
Y ENTEROCOL DNA STL QL NAA+PROBE: NEGATIVE

## 2024-12-31 PROCEDURE — 87507 IADNA-DNA/RNA PROBE TQ 12-25: CPT | Performed by: NURSE PRACTITIONER

## 2024-12-31 PROCEDURE — 87338 HPYLORI STOOL AG IA: CPT | Performed by: NURSE PRACTITIONER

## 2025-01-02 LAB — H PYLORI AG STL QL IA: NEGATIVE

## 2025-01-25 DIAGNOSIS — I10 PRIMARY HYPERTENSION: ICD-10-CM

## 2025-01-27 RX ORDER — AMLODIPINE BESYLATE 10 MG/1
10 TABLET ORAL DAILY
Qty: 90 TABLET | Refills: 0 | OUTPATIENT
Start: 2025-01-27

## 2025-01-27 RX ORDER — AMLODIPINE BESYLATE 10 MG/1
10 TABLET ORAL DAILY
Qty: 90 TABLET | Refills: 3 | OUTPATIENT
Start: 2025-01-27

## 2025-03-24 ENCOUNTER — MYC REFILL (OUTPATIENT)
Dept: FAMILY MEDICINE | Facility: CLINIC | Age: 60
End: 2025-03-24
Payer: COMMERCIAL

## 2025-03-24 DIAGNOSIS — G89.29 CHRONIC BACK PAIN, UNSPECIFIED BACK LOCATION, UNSPECIFIED BACK PAIN LATERALITY: ICD-10-CM

## 2025-03-24 DIAGNOSIS — R63.2 BINGE EATING: ICD-10-CM

## 2025-03-24 DIAGNOSIS — M54.9 CHRONIC BACK PAIN, UNSPECIFIED BACK LOCATION, UNSPECIFIED BACK PAIN LATERALITY: ICD-10-CM

## 2025-03-24 RX ORDER — TRAMADOL HYDROCHLORIDE 50 MG/1
50 TABLET ORAL 2 TIMES DAILY PRN
Qty: 60 TABLET | Refills: 2 | Status: SHIPPED | OUTPATIENT
Start: 2025-03-24

## 2025-04-15 ENCOUNTER — PATIENT OUTREACH (OUTPATIENT)
Dept: CARE COORDINATION | Facility: CLINIC | Age: 60
End: 2025-04-15
Payer: COMMERCIAL

## 2025-04-23 ENCOUNTER — TELEPHONE (OUTPATIENT)
Dept: FAMILY MEDICINE | Facility: CLINIC | Age: 60
End: 2025-04-23
Payer: COMMERCIAL

## 2025-04-23 NOTE — TELEPHONE ENCOUNTER
Patient Quality Outreach    Patient is due for the following:   Hypertension -  BP check    Action(s) Taken:   Schedule a nurse only visit for BP Check    Type of outreach:    Sent [employer] message.    Questions for provider review:    None         Cassie Haddad MA  Chart routed to None.

## 2025-05-06 ENCOUNTER — PATIENT OUTREACH (OUTPATIENT)
Dept: CARE COORDINATION | Facility: CLINIC | Age: 60
End: 2025-05-06
Payer: COMMERCIAL

## 2025-05-13 ENCOUNTER — PATIENT OUTREACH (OUTPATIENT)
Dept: CARE COORDINATION | Facility: CLINIC | Age: 60
End: 2025-05-13
Payer: COMMERCIAL

## 2025-05-14 ENCOUNTER — OFFICE VISIT (OUTPATIENT)
Dept: FAMILY MEDICINE | Facility: CLINIC | Age: 60
End: 2025-05-14
Payer: COMMERCIAL

## 2025-05-14 VITALS
SYSTOLIC BLOOD PRESSURE: 132 MMHG | HEART RATE: 70 BPM | DIASTOLIC BLOOD PRESSURE: 90 MMHG | OXYGEN SATURATION: 98 % | WEIGHT: 165.6 LBS | BODY MASS INDEX: 26.73 KG/M2 | RESPIRATION RATE: 12 BRPM | TEMPERATURE: 98.1 F

## 2025-05-14 DIAGNOSIS — J20.9 ACUTE BRONCHITIS, UNSPECIFIED ORGANISM: ICD-10-CM

## 2025-05-14 PROCEDURE — 3075F SYST BP GE 130 - 139MM HG: CPT | Performed by: FAMILY MEDICINE

## 2025-05-14 PROCEDURE — 3080F DIAST BP >= 90 MM HG: CPT | Performed by: FAMILY MEDICINE

## 2025-05-14 PROCEDURE — 99213 OFFICE O/P EST LOW 20 MIN: CPT | Performed by: FAMILY MEDICINE

## 2025-05-14 RX ORDER — PREDNISONE 20 MG/1
40 TABLET ORAL DAILY
Qty: 10 TABLET | Refills: 0 | Status: SHIPPED | OUTPATIENT
Start: 2025-05-14 | End: 2025-05-19

## 2025-05-14 RX ORDER — BUDESONIDE 90 UG/1
2 AEROSOL, POWDER RESPIRATORY (INHALATION) 2 TIMES DAILY
Qty: 3 EACH | Refills: 2 | Status: SHIPPED | OUTPATIENT
Start: 2025-05-14

## 2025-05-14 RX ORDER — AZITHROMYCIN 250 MG/1
TABLET, FILM COATED ORAL
Qty: 6 TABLET | Refills: 0 | Status: SHIPPED | OUTPATIENT
Start: 2025-05-14 | End: 2025-05-19

## 2025-05-14 ASSESSMENT — ENCOUNTER SYMPTOMS: COUGH: 1

## 2025-05-14 NOTE — PROGRESS NOTES
"  Assessment & Plan     Acute bronchitis, unspecified organism  Patient with a history of bronchospasm triggered by infection.  Recommend continuing Pulmicort and will add Zithromax and prednisone given not improving.  Can use over-the-counter medications for cough suppressant.  Tessalon Perles were not effective.  Due for annual exam with Loraine Buchanan in June.  - budesonide (PULMICORT FLEXHALER) 90 MCG/ACT inhaler; Inhale 2 puffs into the lungs 2 times daily.  - azithromycin (ZITHROMAX) 250 MG tablet; Take 2 tablets (500 mg) by mouth daily for 1 day, THEN 1 tablet (250 mg) daily for 4 days.  - predniSONE (DELTASONE) 20 MG tablet; Take 2 tablets (40 mg) by mouth daily for 5 days.          BMI  Estimated body mass index is 26.73 kg/m  as calculated from the following:    Height as of 12/30/24: 1.676 m (5' 6\").    Weight as of this encounter: 75.1 kg (165 lb 9.6 oz).             Cari Soler is a 60 year old, presenting for the following health issues:  Cough (With Nasal Congestion - night time, X 12 plus days - )        5/14/2025     9:22 AM   Additional Questions   Roomed by Gabriela GREEN CMA   Accompanied by Steve     Cough    History of Present Illness       Reason for visit:  Ongoing cold, cough  Symptom onset:  1-2 weeks ago  Symptoms include:  Cold symptoms, deep cough and some pressure in ears  Symptom intensity:  Moderate  Symptom progression:  Staying the same  Had these symptoms before:  Yes  Has tried/received treatment for these symptoms:  Yes  Previous treatment was successful:  No  What makes it worse:  Nighttime chest is more congested and coughing keeping me awake  What makes it better:  Been taking NyQuil and DayQuil and mucinex   She is taking medications regularly.      Cough is productive, sleep disrupted, using OTC medication,  has also been ill and has been treated with zithromax and doxycycline  No high fevers, notes fatigue but not sleeping well, thick nasal drainage  Symptoms have been " present for 11 days, not getting better  No headache/sinus pain                    Objective    /90   Pulse 70   Temp 98.1  F (36.7  C)   Resp 12   Wt 75.1 kg (165 lb 9.6 oz)   LMP  (LMP Unknown)   SpO2 98%   BMI 26.73 kg/m    Body mass index is 26.73 kg/m .  Physical Exam     Alert cooperative in no acute distress  TMs normal in appearance bilaterally, no sinus tenderness, oral mucosa moist, pharynx not erythematous  Neck supple with no lymphadenopathy or thyromegaly  Heart regular rate and rhythm without murmurs  Coarse wheezes diffusely worse on expiration            Signed Electronically by: Dewayne Fontanez MD

## 2025-05-20 ENCOUNTER — PATIENT OUTREACH (OUTPATIENT)
Dept: CARE COORDINATION | Facility: CLINIC | Age: 60
End: 2025-05-20
Payer: COMMERCIAL

## 2025-06-23 DIAGNOSIS — M54.9 CHRONIC BACK PAIN, UNSPECIFIED BACK LOCATION, UNSPECIFIED BACK PAIN LATERALITY: ICD-10-CM

## 2025-06-23 DIAGNOSIS — G89.29 CHRONIC BACK PAIN, UNSPECIFIED BACK LOCATION, UNSPECIFIED BACK PAIN LATERALITY: ICD-10-CM

## 2025-06-24 RX ORDER — TRAMADOL HYDROCHLORIDE 50 MG/1
50 TABLET ORAL 2 TIMES DAILY PRN
Qty: 60 TABLET | Refills: 2 | Status: SHIPPED | OUTPATIENT
Start: 2025-06-24

## 2025-06-30 ENCOUNTER — TELEPHONE (OUTPATIENT)
Dept: FAMILY MEDICINE | Facility: CLINIC | Age: 60
End: 2025-06-30
Payer: COMMERCIAL

## 2025-06-30 NOTE — TELEPHONE ENCOUNTER
Prior Authorization Retail Medication Request    Medication/Dose: Semaglutide-Weight Management (WEGOVY) 1 MG/0.5ML pen    Diagnosis and ICD code (if different than what is on RX):  Z68.68    New/renewal/insurance change PA/secondary ins. PA:  Previously Tried and Failed:    Rationale:      Insurance   Primary:   Insurance ID:      Secondary (if applicable):  Insurance ID:      Pharmacy Information (if different than what is on RX)  Name:  [facility]  Phone:  [phone]  Fax:  [phone]    Clinic Information  Preferred routing pool for dept communication: [facility] Pool     CoverMyMeds Key: [id number]

## 2025-07-03 NOTE — TELEPHONE ENCOUNTER
Prior Authorization Approval    Medication: WEGOVY 1 MG/0.5ML SC SOAJ  Authorization Effective Date: 6/24/2025  Authorization Expiration Date: 6/24/2026  Approved Dose/Quantity:       Reference #:     Insurance Company: [employer] [phone] Fax [phone]  Expected CoPay: $    CoPay Card Available:      Financial Assistance Needed:   Which Pharmacy is filling the prescription:    Pharmacy Notified: YES  Patient Notified: Instructed pharmacy to notify patient once order is ready.

## 2025-07-09 DIAGNOSIS — F51.02 TRANSIENT INSOMNIA: ICD-10-CM

## 2025-07-09 RX ORDER — TRAZODONE HYDROCHLORIDE 50 MG/1
TABLET ORAL
Qty: 90 TABLET | Refills: 0 | Status: SHIPPED | OUTPATIENT
Start: 2025-07-09

## 2025-07-14 SDOH — HEALTH STABILITY: PHYSICAL HEALTH: ON AVERAGE, HOW MANY DAYS PER WEEK DO YOU ENGAGE IN MODERATE TO STRENUOUS EXERCISE (LIKE A BRISK WALK)?: 7 DAYS

## 2025-07-14 SDOH — HEALTH STABILITY: PHYSICAL HEALTH: ON AVERAGE, HOW MANY MINUTES DO YOU ENGAGE IN EXERCISE AT THIS LEVEL?: 90 MIN

## 2025-07-14 ASSESSMENT — SOCIAL DETERMINANTS OF HEALTH (SDOH): HOW OFTEN DO YOU GET TOGETHER WITH FRIENDS OR RELATIVES?: ONCE A WEEK

## 2025-07-16 ENCOUNTER — OFFICE VISIT (OUTPATIENT)
Dept: FAMILY MEDICINE | Facility: CLINIC | Age: 60
End: 2025-07-16
Payer: COMMERCIAL

## 2025-07-16 ENCOUNTER — MYC MEDICAL ADVICE (OUTPATIENT)
Dept: FAMILY MEDICINE | Facility: CLINIC | Age: 60
End: 2025-07-16

## 2025-07-16 VITALS
DIASTOLIC BLOOD PRESSURE: 80 MMHG | HEIGHT: 66 IN | WEIGHT: 167.9 LBS | RESPIRATION RATE: 14 BRPM | BODY MASS INDEX: 26.98 KG/M2 | OXYGEN SATURATION: 98 % | HEART RATE: 75 BPM | TEMPERATURE: 98.1 F | SYSTOLIC BLOOD PRESSURE: 132 MMHG

## 2025-07-16 DIAGNOSIS — Z13.220 LIPID SCREENING: ICD-10-CM

## 2025-07-16 DIAGNOSIS — Z00.00 ROUTINE GENERAL MEDICAL EXAMINATION AT A HEALTH CARE FACILITY: Primary | ICD-10-CM

## 2025-07-16 DIAGNOSIS — G43.901 MIGRAINE WITH STATUS MIGRAINOSUS, NOT INTRACTABLE, UNSPECIFIED MIGRAINE TYPE: ICD-10-CM

## 2025-07-16 DIAGNOSIS — G89.29 CHRONIC BACK PAIN, UNSPECIFIED BACK LOCATION, UNSPECIFIED BACK PAIN LATERALITY: ICD-10-CM

## 2025-07-16 DIAGNOSIS — Z13.1 SCREENING FOR DIABETES MELLITUS: ICD-10-CM

## 2025-07-16 DIAGNOSIS — M54.9 CHRONIC BACK PAIN, UNSPECIFIED BACK LOCATION, UNSPECIFIED BACK PAIN LATERALITY: ICD-10-CM

## 2025-07-16 DIAGNOSIS — M54.2 CHRONIC NECK PAIN: ICD-10-CM

## 2025-07-16 DIAGNOSIS — F33.1 MAJOR DEPRESSIVE DISORDER, RECURRENT EPISODE, MODERATE (H): ICD-10-CM

## 2025-07-16 DIAGNOSIS — M17.11 PRIMARY OSTEOARTHRITIS OF RIGHT KNEE: ICD-10-CM

## 2025-07-16 DIAGNOSIS — G89.29 CHRONIC NECK PAIN: ICD-10-CM

## 2025-07-16 DIAGNOSIS — I10 PRIMARY HYPERTENSION: ICD-10-CM

## 2025-07-16 DIAGNOSIS — R63.2 BINGE EATING: ICD-10-CM

## 2025-07-16 DIAGNOSIS — F51.02 TRANSIENT INSOMNIA: ICD-10-CM

## 2025-07-16 PROCEDURE — 90471 IMMUNIZATION ADMIN: CPT | Performed by: NURSE PRACTITIONER

## 2025-07-16 PROCEDURE — 3079F DIAST BP 80-89 MM HG: CPT | Performed by: NURSE PRACTITIONER

## 2025-07-16 PROCEDURE — 99214 OFFICE O/P EST MOD 30 MIN: CPT | Mod: 25 | Performed by: NURSE PRACTITIONER

## 2025-07-16 PROCEDURE — 3075F SYST BP GE 130 - 139MM HG: CPT | Performed by: NURSE PRACTITIONER

## 2025-07-16 PROCEDURE — 90677 PCV20 VACCINE IM: CPT | Performed by: NURSE PRACTITIONER

## 2025-07-16 PROCEDURE — 99396 PREV VISIT EST AGE 40-64: CPT | Mod: 25 | Performed by: NURSE PRACTITIONER

## 2025-07-16 RX ORDER — ZOLMITRIPTAN 5 MG/1
5 TABLET, ORALLY DISINTEGRATING ORAL
Qty: 12 TABLET | Refills: 4 | Status: SHIPPED | OUTPATIENT
Start: 2025-07-16

## 2025-07-16 RX ORDER — TRAZODONE HYDROCHLORIDE 50 MG/1
50 TABLET ORAL AT BEDTIME
Qty: 90 TABLET | Refills: 3 | Status: SHIPPED | OUTPATIENT
Start: 2025-07-16

## 2025-07-16 RX ORDER — BUPROPION HYDROCHLORIDE 100 MG/1
TABLET ORAL
Qty: 21 TABLET | Refills: 0 | Status: SHIPPED | OUTPATIENT
Start: 2025-07-16

## 2025-07-16 RX ORDER — AMLODIPINE BESYLATE 2.5 MG/1
2.5 TABLET ORAL DAILY
Qty: 90 TABLET | Refills: 3 | Status: SHIPPED | OUTPATIENT
Start: 2025-07-16

## 2025-07-16 ASSESSMENT — PATIENT HEALTH QUESTIONNAIRE - PHQ9
SUM OF ALL RESPONSES TO PHQ QUESTIONS 1-9: 5
10. IF YOU CHECKED OFF ANY PROBLEMS, HOW DIFFICULT HAVE THESE PROBLEMS MADE IT FOR YOU TO DO YOUR WORK, TAKE CARE OF THINGS AT HOME, OR GET ALONG WITH OTHER PEOPLE: SOMEWHAT DIFFICULT
SUM OF ALL RESPONSES TO PHQ QUESTIONS 1-9: 5

## 2025-07-16 NOTE — PATIENT INSTRUCTIONS
Patient Education   Preventive Care Advice   This is general advice given by our system to help you stay healthy. However, your care team may have specific advice just for you. Please talk to your care team about your preventive care needs.  Nutrition  Eat 5 or more servings of fruits and vegetables each day.  Try wheat bread, brown rice and whole grain pasta (instead of white bread, rice, and pasta).  Get enough calcium and vitamin D. Check the label on foods and aim for 100% of the RDA (recommended daily allowance).  Lifestyle  Exercise at least 150 minutes each week  (30 minutes a day, 5 days a week).  Do muscle strengthening activities 2 days a week. These help control your weight and prevent disease.  No smoking.  Wear sunscreen to prevent skin cancer.  Have a dental exam and cleaning every 6 months.  Yearly exams  See your health care team every year to talk about:  Any changes in your health.  Any medicines your care team has prescribed.  Preventive care, family planning, and ways to prevent chronic diseases.  Shots (vaccines)   HPV shots (up to age 26), if you've never had them before.  Hepatitis B shots (up to age 59), if you've never had them before.  COVID-19 shot: Get this shot when it's due.  Flu shot: Get a flu shot every year.  Tetanus shot: Get a tetanus shot every 10 years.  Pneumococcal, hepatitis A, and RSV shots: Ask your care team if you need these based on your risk.  Shingles shot (for age 50 and up)  General health tests  Diabetes screening:  Starting at age 35, Get screened for diabetes at least every 3 years.  If you are younger than age 35, ask your care team if you should be screened for diabetes.  Cholesterol test: At age 39, start having a cholesterol test every 5 years, or more often if advised.  Bone density scan (DEXA): At age 50, ask your care team if you should have this scan for osteoporosis (brittle bones).  Hepatitis C: Get tested at least once in your life.  STIs (sexually  transmitted infections)  Before age 24: Ask your care team if you should be screened for STIs.  After age 24: Get screened for STIs if you're at risk. You are at risk for STIs (including HIV) if:  You are sexually active with more than one person.  You don't use condoms every time.  You or a partner was diagnosed with a sexually transmitted infection.  If you are at risk for HIV, ask about PrEP medicine to prevent HIV.  Get tested for HIV at least once in your life, whether you are at risk for HIV or not.  Cancer screening tests  Cervical cancer screening: If you have a cervix, begin getting regular cervical cancer screening tests starting at age 21.  Breast cancer scan (mammogram): If you've ever had breasts, begin having regular mammograms starting at age 40. This is a scan to check for breast cancer.  Colon cancer screening: It is important to start screening for colon cancer at age 45.  Have a colonoscopy test every 10 years (or more often if you're at risk) Or, ask your provider about stool tests like a FIT test every year or Cologuard test every 3 years.  To learn more about your testing options, visit:   .  For help making a decision, visit:   https://bit.ly/mn18615.  Prostate cancer screening test: If you have a prostate, ask your care team if a prostate cancer screening test (PSA) at age 55 is right for you.  Lung cancer screening: If you are a current or former smoker ages 50 to 80, ask your care team if ongoing lung cancer screenings are right for you.  For informational purposes only. Not to replace the advice of your health care provider. Copyright   2023 [facility]. All rights reserved. Clinically reviewed by the [facility]. [place]  [id number] - REV 01/24.  Learning About Stress  What is stress?     Stress is your body's response to a hard situation. Your body can have a physical, emotional, or mental response. Stress is a fact of life for most people, and it  affects everyone differently. What causes stress for you may not be stressful for someone else.  A lot of things can cause stress. You may feel stress when you go on a job interview, take a test, or run a race. This kind of short-term stress is normal and even useful. It can help you if you need to work hard or react quickly. For example, stress can help you finish an important job on time.  Long-term stress is caused by ongoing stressful situations or events. Examples of long-term stress include long-term health problems, ongoing problems at work, or conflicts in your family. Long-term stress can harm your health.  How does stress affect your health?  When you are stressed, your body responds as though you are in danger. It makes hormones that speed up your heart, make you breathe faster, and give you a burst of energy. This is called the fight-or-flight stress response. If the stress is over quickly, your body goes back to normal and no harm is done.  But if stress happens too often or lasts too long, it can have bad effects. Long-term stress can make you more likely to get sick, and it can make symptoms of some diseases worse. If you tense up when you are stressed, you may develop neck, shoulder, or low back pain. Stress is linked to high blood pressure and heart disease.  Stress also harms your emotional health. It can make you chambers, tense, or depressed. Your relationships may suffer, and you may not do well at work or school.  What can you do to manage stress?  You can try these things to help manage stress:   Do something active. Exercise or activity can help reduce stress. Walking is a great way to get started. Even everyday activities such as housecleaning or yard work can help.  Try yoga or aria chi. These techniques combine exercise and meditation. You may need some training at first to learn them.  Do something you enjoy. For example, listen to music or go to a movie. Practice your hobby or do volunteer  "work.  Meditate. This can help you relax, because you are not worrying about what happened before or what may happen in the future.  Do guided imagery. Imagine yourself in any setting that helps you feel calm. You can use online videos, books, or a teacher to guide you.  Do breathing exercises. For example:  From a standing position, bend forward from the waist with your knees slightly bent. Let your arms dangle close to the floor.  Breathe in slowly and deeply as you return to a standing position. Roll up slowly and lift your head last.  Hold your breath for just a few seconds in the standing position.  Breathe out slowly and bend forward from the waist.  Let your feelings out. Talk, laugh, cry, and express anger when you need to. Talking with supportive friends or family, a counselor, or a velma leader about your feelings is a healthy way to relieve stress. Avoid discussing your feelings with people who make you feel worse.  Write. It may help to write about things that are bothering you. This helps you find out how much stress you feel and what is causing it. When you know this, you can find better ways to cope.  What can you do to prevent stress?  You might try some of these things to help prevent stress:  Manage your time. This helps you find time to do the things you want and need to do.  Get enough sleep. Your body recovers from the stresses of the day while you are sleeping.  Get support. Your family, friends, and community can make a difference in how you experience stress.  Limit your news feed. Avoid or limit time on social media or news that may make you feel stressed.  Do something active. Exercise or activity can help reduce stress. Walking is a great way to get started.  Where can you learn more?  Go to https://www.Sproxil.net/patiented  Enter N032 in the search box to learn more about \"Learning About Stress.\"  Current as of: October 24, 2024  Content Version: 14.5 2024-2025 [employer].   Care instructions adapted under license by your healthcare professional. If you have questions about a medical condition or this instruction, always ask your healthcare professional. [employer] disclaims any warranty or liability for your use of this information.    Recovering From Depression: Care Instructions  Overview    Sticking to your treatment plan is important as you recover from depression. It may take time for your symptoms to get better after you start treatment. Try not to give up if you don't feel better right away. Make sure you keep going to counseling and taking any prescribed medicine if they are part of your treatment plan.  Focus on things that can help you feel better, such as being with friends and family. Try to eat healthy foods, be active, and get enough sleep. Take things slowly as you begin to recover.  Follow-up care is a key part of your treatment and safety. Be sure to make and go to all appointments, and call your doctor if you are having problems. It's also a good idea to know your test results and keep a list of the medicines you take.  How can you care for yourself at home?  Be realistic  If you have a large task to do, break it up into smaller steps you can handle, and just do what you can.  You may want to put off important decisions until your depression has lifted. If you have plans that will have a major impact on your life, such as marriage, divorce, or a job change, try to wait a bit. Talk it over with friends and loved ones who can help you look at the overall picture first.  Reaching out to people for help is important. Do not isolate yourself. Let your family and friends help you. Find someone you can trust and confide in, and talk to that person.  Be patient, and be kind to yourself. Remember that depression is not your fault and is not something you can overcome with willpower alone. Treatment is important for depression, just like for any other  illness. Feeling better takes time, and your mood will improve little by little.  Stay active  Stay busy and get outside. Take a walk, or try some other light exercise.  Talk with your doctor about an exercise program. Exercise can help with mild depression.  Go to a movie or concert. Take part in a [employer] activity or other social gathering. Go to a ball game.  Ask a friend to have dinner with you.  Take care of yourself  Eat healthy foods such as fresh fruits and vegetables, whole grains, and lean protein. If you have lost your appetite, eat small snacks rather than large meals.  Avoid using marijuana and other drugs and drinking alcohol. Do not take medicines that have not been prescribed for you. They may interfere with medicines you may be taking for depression, or they may make your depression worse.  Take your medicines exactly as they are prescribed. You may start to feel better within 1 to 3 weeks of taking antidepressant medicine. But it can take as many as 6 to 8 weeks to see more improvement. If you have questions or concerns about your medicines, or if you do not notice any improvement by 3 weeks, talk to your doctor.  Continue to take your medicine after your symptoms improve. Taking your medicine for at least 6 months after you feel better can help keep you from getting depressed again. If this isn't the first time you have been depressed, your doctor may recommend you to take medicine even longer.  If you have any side effects from your medicine, tell your doctor. Many side effects are mild and will go away on their own after you have been taking the medicine for a few weeks. Some may last longer. Talk to your doctor if side effects are bothering you too much. You might be able to try a different medicine.  Continue counseling. It may help prevent depression from returning, especially if you've had multiple episodes of depression. Talk with your counselor if you are having a hard time attending your  sessions or you think the sessions aren't working. Don't just stop going.  Get enough sleep. Talk to your doctor if you are having problems sleeping.  Avoid sleeping pills unless they are prescribed by the doctor treating your depression. Sleeping pills may make you groggy during the day, and they may interact with other medicine you are taking.  If you have any other illnesses, such as diabetes, heart disease, or high blood pressure, make sure to continue with your treatment. Tell your doctor about all of the medicines you take, including those with or without a prescription.  Where to get help 24 hours a day, 7 days a week  If you or someone you know talks about suicide, self-harm, a mental health crisis, a substance use crisis, or any other kind of emotional distress, get help right away. You can:  Call the Suicide and Crisis Lifeline at "[employer]".  Text HOME to 923478 to access the Crisis Text Line.  Consider saving these numbers in your phone.  Go to [employer] for more information or to chat online.  Call 471 anytime you think you may need emergency care. For example, call if:  You feel like hurting yourself or someone else.  Someone you know has depression and is about to attempt or is attempting suicide.  Where to get help 24 hours a day, 7 days a week  If you or someone you know talks about suicide, self-harm, a mental health crisis, a substance use crisis, or any other kind of emotional distress, get help right away. You can:  Call the Suicide and Crisis Lifeline at 440.  Text HOME to 186511 to access the Crisis Text Line.  Consider saving these numbers in your phone.  Go to [employer] for more information or to chat online.  Call your doctor now or seek immediate medical care if:  You hear voices.  Someone you know has depression and:  Starts to give away possessions.  Uses illegal drugs or drinks alcohol heavily.  Talks or writes about death, including writing suicide notes or talking about guns,  "knives, or pills.  Starts to spend a lot of time alone.  Acts very aggressively or suddenly appears calm.  Watch closely for changes in your health, and be sure to contact your doctor if:  You do not get better as expected.  Where can you learn more?  Go to https://www.Pidefarma.net/patiented  Enter N529 in the search box to learn more about \"Recovering From Depression: Care Instructions.\"  Current as of: July 31, 2024  Content Version: 14.5 2024-2025 [employer].   Care instructions adapted under license by your healthcare professional. If you have questions about a medical condition or this instruction, always ask your healthcare professional. [employer] disclaims any warranty or liability for your use of this information.       "

## 2025-07-16 NOTE — PROGRESS NOTES
Preventive Care Visit  [facility]  Loraine Buchanan NP, Family Medicine  Jul 16, 2025      Assessment & Plan     (Z00.00) Routine general medical examination at a health care facility  (primary encounter diagnosis)  Comment:   Plan: Pneumococcal 20 Valent Conjugate (PCV20),         REVIEW OF HEALTH MAINTENANCE PROTOCOL ORDERS            (G43.901) Migraine with status migrainosus, not intractable, unspecified migraine type  Comment:   Plan: ZOLMitriptan (ZOMIG-ZMT) 5 MG ODT        Migraine relieved with Zomig about every 2months    (F51.02) Transient insomnia  Comment:   Plan: traZODone (DESYREL) 50 MG tablet        Not clear if tramadol is contributing to sleep problems    (R63.2) Binge eating  Comment:   Plan: Semaglutide-Weight Management (WEGOVY) 1.7         MG/0.75ML pen, PRIMARY CARE FOLLOW-UP         SCHEDULING        Very successful wt loss over 18 months, desire #15 more    (F33.1) Major depressive disorder, recurrent episode, moderate (H)  Comment:   Plan: buPROPion (WELLBUTRIN) 100 MG tablet        Feeling so well is going to taper wellbutrin over next 4 weeks    (Z13.220) Lipid screening  Comment:   Plan: Lipid panel reflex to direct LDL Fasting            (Z13.1) Screening for diabetes mellitus  Comment:   Plan: Glucose            (I10) Primary hypertension  Comment:   Plan: amLODIPine (NORVASC) 2.5 MG tablet        Restart lower dose amlodipine    (M54.2,  G89.29) Chronic neck pain  Comment:   Plan: PRIMARY CARE FOLLOW-UP SCHEDULING            (M54.9,  G89.29) Chronic back pain, unspecified back location, unspecified back pain laterality  Comment:   Plan: PRIMARY CARE FOLLOW-UP SCHEDULING            Back and neck pain still requiring mild narcotic but only on tramadol twice a day with tylenol. Will continue this routine at this time. Need CSA at next visit    (M17.11) Primary osteoarthritis of right knee  Comment:   Plan: Orthopedic  Referral        She needs referral to  "see her surgeon in follow ups    (Z68.32) BMI 32.0-32.9,adult  Comment:   Plan: PRIMARY CARE FOLLOW-UP SCHEDULING              BMI  Estimated body mass index is 27.52 kg/m  as calculated from the following:    Height as of this encounter: 1.664 m (5' 5.5\").    Weight as of this encounter: 76.2 kg (167 lb 14.4 oz).   Weight management plan: Discussed healthy diet and exercise guidelines additional 10 min today spent in conversation about GLP1 medication and healthy diet    Counseling  Appropriate preventive services were addressed with this patient via screening, questionnaire, or discussion as appropriate for fall prevention, nutrition, physical activity, Tobacco-use cessation, social engagement, weight loss and cognition.  Checklist reviewing preventive services available has been given to the patient.  Reviewed patient's diet, addressing concerns and/or questions.   She is at risk for psychosocial distress and has been provided with information to reduce risk.   Reviewed preventive health counseling, as reflected in patient instructions       Regular exercise       Healthy diet/nutrition       Vision screening       Osteoporosis prevention/bone health       Colorectal Cancer Screening       Advance Care Planning        Cari Soler is a 60 year old, presenting for the following: patient is here for an annual exam, discuss possible increasing dose on Wegovy    Bp at home is climbing, readings high 130'2 and 140's, had stopped amlodipine 5mg after some wt loss, would like to go back to using a low dose amlodipine and will monitor bp at home and watch for signs of low bp     Wegovy--she dropped down to 1mg for a few months due to rapid wt loss.  Actually started Wegovy 18 months ago. Has lost about #45 , able to walk more.Very pleased with results.  Still would like to increase dose again as desires #15 more wt loss. Counseled today regarding nutrition and ways to get vegetables and more protein into her " day      Had bilat knee replacement outside of network and needs referral again to do follow up visit      Pain--knees without pain. Still with neck and back. Hx of GI bleed , can't use NSAIDS  Takes  Tylenol 2-3 times per day. Takes Tramadol twice a day. Feels it helps with pain but maybe interfere with sleep    HA-- having migraines with aura with a halo, this is new, zomig resolved.only has migrain maybe every 2 months    Depression--has used lexapro   Physical        7/16/2025    12:43 PM   Additional Questions   Roomed by priscilla simmons   Accompanied by self        HPI     Hypertension Follow-up    Do you check your blood pressure regularly outside of the clinic? No   Are you following a low salt diet? Yes  Are your blood pressures ever more than 140 on the top number (systolic) OR more   than 90 on the bottom number (diastolic), for example 140/90? No    BP Readings from Last 2 Encounters:   07/16/25 132/80   05/14/25 132/90       Advance Care Planning    Health Care Directive received at today's visit.  Forwarded to [employer].        7/14/2025   General Health   How would you rate your overall physical health? Good   Feel stress (tense, anxious, or unable to sleep) To some extent   (!) STRESS CONCERN      7/14/2025   Nutrition   Three or more servings of calcium each day? (!) NO   Diet: Regular (no restrictions)   How many servings of fruit and vegetables per day? (!) 2-3   How many sweetened beverages each day? (!) 4+         7/14/2025   Exercise   Days per week of moderate/strenous exercise 7 days   Average minutes spent exercising at this level 90 min         7/14/2025   Social Factors   Frequency of gathering with friends or relatives Once a week   Worry food won't last until get money to buy more No   Food not last or not have enough money for food? No   Do you have housing? (Housing is defined as stable permanent housing and does not include staying outside in a car, in a tent, in an abandoned  building, in an overnight shelter, or couch-surfing.) Yes   Are you worried about losing your housing? No   Lack of transportation? No   Unable to get utilities (heat,electricity)? No         2025   Fall Risk   Fallen 2 or more times in the past year? No   Trouble with walking or balance? No          2025   Dental   Dentist two times every year? Yes       Today's PHQ-9 Score:       2025     8:27 AM   PHQ-9 SCORE   PHQ-9 Total Score MyChart 5 (Mild depression)   PHQ-9 Total Score 5        Patient-reported         2025   Substance Use   Alcohol more than 3/day or more than 7/wk No   Do you use any other substances recreationally? No     Social History     Tobacco Use    Smoking status: Former     Current packs/day: 0.00     Average packs/day: 1 pack/day for 4.0 years (4.0 ttl pk-yrs)     Types: Cigarettes     Start date: 3/14/1987     Quit date: 3/14/1991     Years since quittin.3     Passive exposure: Past    Smokeless tobacco: Never   Vaping Use    Vaping status: Never Used   Substance Use Topics    Alcohol use: Not Currently     Alcohol/week: 0.0 standard drinks of alcohol    Drug use: No           2023   LAST FHS-7 RESULTS   1st degree relative breast or ovarian cancer Yes   Any relative bilateral breast cancer No   Any male have breast cancer No   Any ONE woman have BOTH breast AND ovarian cancer No   Any woman with breast cancer before 50yrs No   2 or more relatives with breast AND/OR ovarian cancer No   2 or more relatives with breast AND/OR bowel cancer No        Mammogram Screening - Mammogram every 1-2 years updated in Health Maintenance based on mutual decision making        2025   STI Screening   New sexual partner(s) since last STI/HIV test? No     History of abnormal Pap smear: No - age 30- 64 PAP with HPV every 5 years recommended        Latest Ref Rng & Units 10/28/2015     2:10 PM 10/28/2015    12:00 AM 10/5/2012    12:28 PM   PAP / HPV   PAP (Historical)   NIL  NIL   "  HPV 16 DNA NEG Negative      HPV 18 DNA NEG Negative      Other HR HPV NEG Negative        ASCVD Risk   The 10-year ASCVD risk score (Crystal WALDEN, et al., 2019) is: 2.8%    Values used to calculate the score:      Age: 60 years      Sex: Female      Is Non- : No      Diabetic: No      Tobacco smoker: No      Systolic Blood Pressure: 132 mmHg      Is BP treated: No      HDL Cholesterol: 67 mg/dL      Total Cholesterol: 238 mg/dL           Reviewed and updated as needed this visit by Provider                             Objective    Exam  /80 (BP Location: Right arm, Patient Position: Sitting)   Pulse 75   Temp 98.1  F (36.7  C)   Resp 14   Ht 1.664 m (5' 5.5\")   Wt 76.2 kg (167 lb 14.4 oz)   LMP  (LMP Unknown)   SpO2 98%   Breastfeeding No   BMI 27.52 kg/m     Estimated body mass index is 27.52 kg/m  as calculated from the following:    Height as of this encounter: 1.664 m (5' 5.5\").    Weight as of this encounter: 76.2 kg (167 lb 14.4 oz).    Physical Exam  GENERAL: alert and no distress  EYES: Eyes grossly normal to inspection, PERRL and conjunctivae and sclerae normal  HENT: ear canals and TM's normal, nose and mouth without ulcers or lesions  NECK: no adenopathy, no asymmetry, masses, or scars  RESP: lungs clear to auscultation - no rales, rhonchi or wheezes  CV: regular rate and rhythm, normal S1 S2, no S3 or S4, no murmur, click or rub, no peripheral edema  ABDOMEN: soft, nontender, no hepatosplenomegaly, no masses and bowel sounds normal  MS: no gross musculoskeletal defects noted, no edema  SKIN: no suspicious lesions or rashes  NEURO: Normal strength and tone, mentation intact and speech normal  PSYCH: mentation appears normal, affect normal/bright        Signed Electronically by: Loraine Buchanan NP    "

## 2025-07-17 ENCOUNTER — PATIENT OUTREACH (OUTPATIENT)
Dept: CARE COORDINATION | Facility: CLINIC | Age: 60
End: 2025-07-17
Payer: COMMERCIAL

## 2025-07-21 ENCOUNTER — PATIENT OUTREACH (OUTPATIENT)
Dept: CARE COORDINATION | Facility: CLINIC | Age: 60
End: 2025-07-21
Payer: COMMERCIAL